# Patient Record
Sex: FEMALE | Race: WHITE | NOT HISPANIC OR LATINO | URBAN - METROPOLITAN AREA
[De-identification: names, ages, dates, MRNs, and addresses within clinical notes are randomized per-mention and may not be internally consistent; named-entity substitution may affect disease eponyms.]

---

## 2017-08-22 ENCOUNTER — OUTPATIENT (OUTPATIENT)
Dept: OUTPATIENT SERVICES | Facility: HOSPITAL | Age: 72
LOS: 1 days | End: 2017-08-22

## 2017-08-22 ENCOUNTER — INPATIENT (INPATIENT)
Facility: HOSPITAL | Age: 72
LOS: 14 days | Discharge: NURSING, REHAB OR LONG-TERM CARE | DRG: 853 | End: 2017-09-06
Attending: INTERNAL MEDICINE | Admitting: INTERNAL MEDICINE
Payer: MEDICARE

## 2017-08-22 VITALS
OXYGEN SATURATION: 99 % | DIASTOLIC BLOOD PRESSURE: 83 MMHG | RESPIRATION RATE: 23 BRPM | SYSTOLIC BLOOD PRESSURE: 153 MMHG | TEMPERATURE: 208 F | HEART RATE: 160 BPM

## 2017-08-22 DIAGNOSIS — E87.6 HYPOKALEMIA: ICD-10-CM

## 2017-08-22 DIAGNOSIS — I10 ESSENTIAL (PRIMARY) HYPERTENSION: ICD-10-CM

## 2017-08-22 DIAGNOSIS — S42.341D DISPLACED SPIRAL FRACTURE OF SHAFT OF HUMERUS, RIGHT ARM, SUBSEQUENT ENCOUNTER FOR FRACTURE WITH ROUTINE HEALING: ICD-10-CM

## 2017-08-22 DIAGNOSIS — G47.00 INSOMNIA, UNSPECIFIED: ICD-10-CM

## 2017-08-22 DIAGNOSIS — S42.342D DISPLACED SPIRAL FRACTURE OF SHAFT OF HUMERUS, LEFT ARM, SUBSEQUENT ENCOUNTER FOR FRACTURE WITH ROUTINE HEALING: ICD-10-CM

## 2017-08-22 DIAGNOSIS — J18.9 PNEUMONIA, UNSPECIFIED ORGANISM: ICD-10-CM

## 2017-08-22 DIAGNOSIS — Z01.818 ENCOUNTER FOR OTHER PREPROCEDURAL EXAMINATION: ICD-10-CM

## 2017-08-22 DIAGNOSIS — I48.91 UNSPECIFIED ATRIAL FIBRILLATION: ICD-10-CM

## 2017-08-22 DIAGNOSIS — E83.39 OTHER DISORDERS OF PHOSPHORUS METABOLISM: ICD-10-CM

## 2017-08-22 DIAGNOSIS — K21.9 GASTRO-ESOPHAGEAL REFLUX DISEASE WITHOUT ESOPHAGITIS: ICD-10-CM

## 2017-08-22 DIAGNOSIS — E78.5 HYPERLIPIDEMIA, UNSPECIFIED: ICD-10-CM

## 2017-08-22 LAB
ALBUMIN SERPL ELPH-MCNC: 3.2 G/DL — LOW (ref 3.3–5)
ALP SERPL-CCNC: 105 U/L — SIGNIFICANT CHANGE UP (ref 40–120)
ALT FLD-CCNC: 27 U/L RC — SIGNIFICANT CHANGE UP (ref 10–45)
ANION GAP SERPL CALC-SCNC: 18 MMOL/L — HIGH (ref 5–17)
ANION GAP SERPL CALC-SCNC: 18 MMOL/L — HIGH (ref 5–17)
APTT BLD: 35.5 SEC — SIGNIFICANT CHANGE UP (ref 27.5–37.4)
AST SERPL-CCNC: 22 U/L — SIGNIFICANT CHANGE UP (ref 10–40)
BASOPHILS # BLD AUTO: 0.1 K/UL — SIGNIFICANT CHANGE UP (ref 0–0.2)
BASOPHILS NFR BLD AUTO: 0.5 % — SIGNIFICANT CHANGE UP (ref 0–2)
BILIRUB SERPL-MCNC: 0.5 MG/DL — SIGNIFICANT CHANGE UP (ref 0.2–1.2)
BLD GP AB SCN SERPL QL: NEGATIVE — SIGNIFICANT CHANGE UP
BUN SERPL-MCNC: 11 MG/DL — SIGNIFICANT CHANGE UP (ref 7–23)
BUN SERPL-MCNC: 9 MG/DL — SIGNIFICANT CHANGE UP (ref 7–23)
CALCIUM SERPL-MCNC: 8.2 MG/DL — LOW (ref 8.4–10.5)
CALCIUM SERPL-MCNC: 8.5 MG/DL — SIGNIFICANT CHANGE UP (ref 8.4–10.5)
CHLORIDE SERPL-SCNC: 97 MMOL/L — SIGNIFICANT CHANGE UP (ref 96–108)
CHLORIDE SERPL-SCNC: 99 MMOL/L — SIGNIFICANT CHANGE UP (ref 96–108)
CK MB CFR SERPL CALC: 1 NG/ML — SIGNIFICANT CHANGE UP (ref 0–3.8)
CK SERPL-CCNC: 18 U/L — LOW (ref 25–170)
CO2 SERPL-SCNC: 23 MMOL/L — SIGNIFICANT CHANGE UP (ref 22–31)
CO2 SERPL-SCNC: 23 MMOL/L — SIGNIFICANT CHANGE UP (ref 22–31)
CREAT SERPL-MCNC: 0.52 MG/DL — SIGNIFICANT CHANGE UP (ref 0.5–1.3)
CREAT SERPL-MCNC: 0.59 MG/DL — SIGNIFICANT CHANGE UP (ref 0.5–1.3)
EOSINOPHIL # BLD AUTO: 0.1 K/UL — SIGNIFICANT CHANGE UP (ref 0–0.5)
EOSINOPHIL NFR BLD AUTO: 0.4 % — SIGNIFICANT CHANGE UP (ref 0–6)
GAS PNL BLDV: SIGNIFICANT CHANGE UP
GLUCOSE SERPL-MCNC: 120 MG/DL — HIGH (ref 70–99)
GLUCOSE SERPL-MCNC: 145 MG/DL — HIGH (ref 70–99)
HCT VFR BLD CALC: 36.5 % — SIGNIFICANT CHANGE UP (ref 34.5–45)
HGB BLD-MCNC: 11.5 G/DL — SIGNIFICANT CHANGE UP (ref 11.5–15.5)
INR BLD: 1.61 RATIO — HIGH (ref 0.88–1.16)
LYMPHOCYTES # BLD AUTO: 1.4 K/UL — SIGNIFICANT CHANGE UP (ref 1–3.3)
LYMPHOCYTES # BLD AUTO: 7.8 % — LOW (ref 13–44)
MAGNESIUM SERPL-MCNC: 0.7 MG/DL — CRITICAL LOW (ref 1.6–2.6)
MCHC RBC-ENTMCNC: 29.2 PG — SIGNIFICANT CHANGE UP (ref 27–34)
MCHC RBC-ENTMCNC: 31.5 GM/DL — LOW (ref 32–36)
MCV RBC AUTO: 92.7 FL — SIGNIFICANT CHANGE UP (ref 80–100)
MONOCYTES # BLD AUTO: 1.3 K/UL — HIGH (ref 0–0.9)
MONOCYTES NFR BLD AUTO: 7 % — SIGNIFICANT CHANGE UP (ref 2–14)
NEUTROPHILS # BLD AUTO: 15.3 K/UL — HIGH (ref 1.8–7.4)
NEUTROPHILS NFR BLD AUTO: 84.4 % — HIGH (ref 43–77)
PHOSPHATE SERPL-MCNC: 2.4 MG/DL — LOW (ref 2.5–4.5)
PLATELET # BLD AUTO: 482 K/UL — HIGH (ref 150–400)
POTASSIUM SERPL-MCNC: 3 MMOL/L — LOW (ref 3.5–5.3)
POTASSIUM SERPL-MCNC: 3.2 MMOL/L — LOW (ref 3.5–5.3)
POTASSIUM SERPL-SCNC: 3 MMOL/L — LOW (ref 3.5–5.3)
POTASSIUM SERPL-SCNC: 3.2 MMOL/L — LOW (ref 3.5–5.3)
PROT SERPL-MCNC: 7.7 G/DL — SIGNIFICANT CHANGE UP (ref 6–8.3)
PROTHROM AB SERPL-ACNC: 17.7 SEC — HIGH (ref 9.8–12.7)
RBC # BLD: 3.94 M/UL — SIGNIFICANT CHANGE UP (ref 3.8–5.2)
RBC # FLD: 12.9 % — SIGNIFICANT CHANGE UP (ref 10.3–14.5)
RH IG SCN BLD-IMP: POSITIVE — SIGNIFICANT CHANGE UP
SODIUM SERPL-SCNC: 138 MMOL/L — SIGNIFICANT CHANGE UP (ref 135–145)
SODIUM SERPL-SCNC: 140 MMOL/L — SIGNIFICANT CHANGE UP (ref 135–145)
TROPONIN T SERPL-MCNC: <0.01 NG/ML — SIGNIFICANT CHANGE UP (ref 0–0.06)
TROPONIN T SERPL-MCNC: <0.01 NG/ML — SIGNIFICANT CHANGE UP (ref 0–0.06)
WBC # BLD: 18.1 K/UL — HIGH (ref 3.8–10.5)
WBC # FLD AUTO: 18.1 K/UL — HIGH (ref 3.8–10.5)

## 2017-08-22 PROCEDURE — 99285 EMERGENCY DEPT VISIT HI MDM: CPT | Mod: GC

## 2017-08-22 PROCEDURE — 71010: CPT | Mod: 26

## 2017-08-22 PROCEDURE — 99223 1ST HOSP IP/OBS HIGH 75: CPT

## 2017-08-22 PROCEDURE — 93010 ELECTROCARDIOGRAM REPORT: CPT

## 2017-08-22 RX ORDER — ACETAMINOPHEN 500 MG
650 TABLET ORAL ONCE
Qty: 0 | Refills: 0 | Status: COMPLETED | OUTPATIENT
Start: 2017-08-22 | End: 2017-08-22

## 2017-08-22 RX ORDER — MAGNESIUM SULFATE 500 MG/ML
2 VIAL (ML) INJECTION ONCE
Qty: 0 | Refills: 0 | Status: COMPLETED | OUTPATIENT
Start: 2017-08-22 | End: 2017-08-22

## 2017-08-22 RX ORDER — ACETAMINOPHEN 500 MG
650 TABLET ORAL EVERY 6 HOURS
Qty: 0 | Refills: 0 | Status: DISCONTINUED | OUTPATIENT
Start: 2017-08-22 | End: 2017-08-26

## 2017-08-22 RX ORDER — PANTOPRAZOLE SODIUM 20 MG/1
40 TABLET, DELAYED RELEASE ORAL
Qty: 0 | Refills: 0 | Status: DISCONTINUED | OUTPATIENT
Start: 2017-08-22 | End: 2017-08-25

## 2017-08-22 RX ORDER — METOPROLOL TARTRATE 50 MG
2.5 TABLET ORAL ONCE
Qty: 0 | Refills: 0 | Status: COMPLETED | OUTPATIENT
Start: 2017-08-22 | End: 2017-08-22

## 2017-08-22 RX ORDER — SODIUM CHLORIDE 9 MG/ML
1000 INJECTION INTRAMUSCULAR; INTRAVENOUS; SUBCUTANEOUS
Qty: 0 | Refills: 0 | Status: DISCONTINUED | OUTPATIENT
Start: 2017-08-22 | End: 2017-08-26

## 2017-08-22 RX ORDER — POTASSIUM PHOSPHATE, MONOBASIC POTASSIUM PHOSPHATE, DIBASIC 236; 224 MG/ML; MG/ML
15 INJECTION, SOLUTION INTRAVENOUS ONCE
Qty: 0 | Refills: 0 | Status: COMPLETED | OUTPATIENT
Start: 2017-08-22 | End: 2017-08-22

## 2017-08-22 RX ORDER — HEPARIN SODIUM 5000 [USP'U]/ML
4000 INJECTION INTRAVENOUS; SUBCUTANEOUS EVERY 6 HOURS
Qty: 0 | Refills: 0 | Status: DISCONTINUED | OUTPATIENT
Start: 2017-08-22 | End: 2017-08-24

## 2017-08-22 RX ORDER — HEPARIN SODIUM 5000 [USP'U]/ML
INJECTION INTRAVENOUS; SUBCUTANEOUS
Qty: 25000 | Refills: 0 | Status: DISCONTINUED | OUTPATIENT
Start: 2017-08-22 | End: 2017-08-23

## 2017-08-22 RX ORDER — POLYETHYLENE GLYCOL 3350 17 G/17G
17 POWDER, FOR SOLUTION ORAL DAILY
Qty: 0 | Refills: 0 | Status: DISCONTINUED | OUTPATIENT
Start: 2017-08-22 | End: 2017-08-25

## 2017-08-22 RX ORDER — METOPROLOL TARTRATE 50 MG
5 TABLET ORAL ONCE
Qty: 0 | Refills: 0 | Status: DISCONTINUED | OUTPATIENT
Start: 2017-08-22 | End: 2017-08-22

## 2017-08-22 RX ORDER — SODIUM CHLORIDE 9 MG/ML
500 INJECTION INTRAMUSCULAR; INTRAVENOUS; SUBCUTANEOUS ONCE
Qty: 0 | Refills: 0 | Status: COMPLETED | OUTPATIENT
Start: 2017-08-22 | End: 2017-08-22

## 2017-08-22 RX ORDER — LISINOPRIL 2.5 MG/1
10 TABLET ORAL DAILY
Qty: 0 | Refills: 0 | Status: DISCONTINUED | OUTPATIENT
Start: 2017-08-22 | End: 2017-08-22

## 2017-08-22 RX ORDER — METOPROLOL TARTRATE 50 MG
5 TABLET ORAL ONCE
Qty: 0 | Refills: 0 | Status: COMPLETED | OUTPATIENT
Start: 2017-08-22 | End: 2017-08-22

## 2017-08-22 RX ORDER — POTASSIUM CHLORIDE 20 MEQ
40 PACKET (EA) ORAL ONCE
Qty: 0 | Refills: 0 | Status: COMPLETED | OUTPATIENT
Start: 2017-08-22 | End: 2017-08-22

## 2017-08-22 RX ORDER — CEFTRIAXONE 500 MG/1
1 INJECTION, POWDER, FOR SOLUTION INTRAMUSCULAR; INTRAVENOUS ONCE
Qty: 0 | Refills: 0 | Status: COMPLETED | OUTPATIENT
Start: 2017-08-22 | End: 2017-08-22

## 2017-08-22 RX ORDER — ATORVASTATIN CALCIUM 80 MG/1
40 TABLET, FILM COATED ORAL AT BEDTIME
Qty: 0 | Refills: 0 | Status: DISCONTINUED | OUTPATIENT
Start: 2017-08-22 | End: 2017-09-06

## 2017-08-22 RX ORDER — ZOLPIDEM TARTRATE 10 MG/1
5 TABLET ORAL AT BEDTIME
Qty: 0 | Refills: 0 | Status: DISCONTINUED | OUTPATIENT
Start: 2017-08-22 | End: 2017-08-25

## 2017-08-22 RX ORDER — METOPROLOL TARTRATE 50 MG
25 TABLET ORAL ONCE
Qty: 0 | Refills: 0 | Status: COMPLETED | OUTPATIENT
Start: 2017-08-22 | End: 2017-08-22

## 2017-08-22 RX ORDER — HEPARIN SODIUM 5000 [USP'U]/ML
4000 INJECTION INTRAVENOUS; SUBCUTANEOUS ONCE
Qty: 0 | Refills: 0 | Status: COMPLETED | OUTPATIENT
Start: 2017-08-22 | End: 2017-08-22

## 2017-08-22 RX ORDER — METOPROLOL TARTRATE 50 MG
25 TABLET ORAL DAILY
Qty: 0 | Refills: 0 | Status: DISCONTINUED | OUTPATIENT
Start: 2017-08-22 | End: 2017-08-23

## 2017-08-22 RX ORDER — POTASSIUM CHLORIDE 20 MEQ
10 PACKET (EA) ORAL
Qty: 0 | Refills: 0 | Status: DISCONTINUED | OUTPATIENT
Start: 2017-08-22 | End: 2017-08-22

## 2017-08-22 RX ORDER — HEPARIN SODIUM 5000 [USP'U]/ML
2000 INJECTION INTRAVENOUS; SUBCUTANEOUS EVERY 6 HOURS
Qty: 0 | Refills: 0 | Status: DISCONTINUED | OUTPATIENT
Start: 2017-08-22 | End: 2017-08-24

## 2017-08-22 RX ORDER — NIFEDIPINE 30 MG
60 TABLET, EXTENDED RELEASE 24 HR ORAL DAILY
Qty: 0 | Refills: 0 | Status: DISCONTINUED | OUTPATIENT
Start: 2017-08-22 | End: 2017-08-22

## 2017-08-22 RX ORDER — ASPIRIN/CALCIUM CARB/MAGNESIUM 324 MG
81 TABLET ORAL DAILY
Qty: 0 | Refills: 0 | Status: DISCONTINUED | OUTPATIENT
Start: 2017-08-22 | End: 2017-09-06

## 2017-08-22 RX ORDER — MAGNESIUM SULFATE 500 MG/ML
1 VIAL (ML) INJECTION ONCE
Qty: 0 | Refills: 0 | Status: DISCONTINUED | OUTPATIENT
Start: 2017-08-22 | End: 2017-08-22

## 2017-08-22 RX ORDER — POTASSIUM CHLORIDE 20 MEQ
40 PACKET (EA) ORAL EVERY 4 HOURS
Qty: 0 | Refills: 0 | Status: COMPLETED | OUTPATIENT
Start: 2017-08-22 | End: 2017-08-23

## 2017-08-22 RX ADMIN — Medication 650 MILLIGRAM(S): at 18:47

## 2017-08-22 RX ADMIN — Medication 50 GRAM(S): at 16:34

## 2017-08-22 RX ADMIN — ATORVASTATIN CALCIUM 40 MILLIGRAM(S): 80 TABLET, FILM COATED ORAL at 22:18

## 2017-08-22 RX ADMIN — SODIUM CHLORIDE 1000 MILLILITER(S): 9 INJECTION INTRAMUSCULAR; INTRAVENOUS; SUBCUTANEOUS at 19:14

## 2017-08-22 RX ADMIN — SODIUM CHLORIDE 55 MILLILITER(S): 9 INJECTION INTRAMUSCULAR; INTRAVENOUS; SUBCUTANEOUS at 23:04

## 2017-08-22 RX ADMIN — Medication 5 MILLIGRAM(S): at 12:48

## 2017-08-22 RX ADMIN — CEFTRIAXONE 100 GRAM(S): 500 INJECTION, POWDER, FOR SOLUTION INTRAMUSCULAR; INTRAVENOUS at 19:14

## 2017-08-22 RX ADMIN — Medication 25 MILLIGRAM(S): at 13:03

## 2017-08-22 RX ADMIN — HEPARIN SODIUM 1000 UNIT(S)/HR: 5000 INJECTION INTRAVENOUS; SUBCUTANEOUS at 18:18

## 2017-08-22 RX ADMIN — Medication 5 MILLIGRAM(S): at 12:30

## 2017-08-22 RX ADMIN — ZOLPIDEM TARTRATE 5 MILLIGRAM(S): 10 TABLET ORAL at 22:18

## 2017-08-22 RX ADMIN — HEPARIN SODIUM 4000 UNIT(S): 5000 INJECTION INTRAVENOUS; SUBCUTANEOUS at 18:18

## 2017-08-22 RX ADMIN — Medication 40 MILLIEQUIVALENT(S): at 22:16

## 2017-08-22 RX ADMIN — POTASSIUM PHOSPHATE, MONOBASIC POTASSIUM PHOSPHATE, DIBASIC 62.5 MILLIMOLE(S): 236; 224 INJECTION, SOLUTION INTRAVENOUS at 22:18

## 2017-08-22 RX ADMIN — Medication 40 MILLIEQUIVALENT(S): at 18:47

## 2017-08-22 RX ADMIN — Medication 2.5 MILLIGRAM(S): at 22:18

## 2017-08-22 NOTE — H&P ADULT - NEGATIVE CARDIOVASCULAR SYMPTOMS
no peripheral edema/no paroxysmal nocturnal dyspnea/no chest pain/no dyspnea on exertion/no palpitations

## 2017-08-22 NOTE — ED PROVIDER NOTE - NS ED ROS FT
No fever, no chills, no change in vision, no change in hearing, no chest pain, no shortness of breath, no abdominal pain, no vomiting, no dysuria, no muscle pain, no rashes, no loss of consciousness. ~ Dawson Bergman MD

## 2017-08-22 NOTE — H&P ADULT - NEUROLOGICAL DETAILS
responds to pain/responds to verbal commands/sensation intact/alert and oriented x 3/cranial nerves intact

## 2017-08-22 NOTE — ED PROVIDER NOTE - PHYSICAL EXAMINATION
Physical Exam: elderly F who is in NAD, AAOx3, NCAT, MMM, neck is supple, PERRL, CTAB, + tachycardia and irregular rhythm, abdomen is soft and NTND, No edema, L humerus in hard cast, NVI distally to all extremities, No rashes, CN grossly intact, No focal motor or sensory deficits. ~ Dawson Bergman MD

## 2017-08-22 NOTE — H&P ADULT - PROBLEM SELECTOR PLAN 1
-Pt found to be in Afib with RVR likely in the setting of sepsis secondary to RLL pna  -Received IV cardizem and metoprolol in ED, rate currently in 90s on tele monitoring  -Will treat uderlying PNA  -If tachycardic overnight can give IV lopressor based on BP readings  -Keep K>4 and Mag >2  -Check echo  -Continue tele monitoring  -First set of cardiac enzymes negative, check serial troponins   -Check tsh in am  -Continue IV heparin, monitor ptt  -Pt will need good adjustment of home regimen of meds, may require uptitration of toprol xl or addition of cardizem depending on rate monitoring on tele   -Unable to find EKG in ED, check stat EKG

## 2017-08-22 NOTE — ED PROVIDER NOTE - OBJECTIVE STATEMENT
Dawson Bergman MD (resident): 72 F w/ HTN,  HLD, and L humerus Fx after mechanical fall in 5/2017 s/p L arm hard brace, who was at pre-operative testing for ORIF planned for tomorrow, when the found pulse up to 170s and EKG w/ AFib w/ RVR. Pt denies any symptoms of CP, SOB, palpitations, lightheadedness, dizziness. Takes metoprolol 25 mg, last yesterday.    PMD: Dr Eliud Perez (cards)   Ortho: Dr Ellis Lopez

## 2017-08-22 NOTE — H&P ADULT - ASSESSMENT
72 year old female with hx of HTN, HLD, gerd, insomnia s/p mechanical fall in 5/2017 with left humeral fracture sent from preop testing for Afib with  RVR.

## 2017-08-22 NOTE — H&P PST ADULT - PMH
Displaced spiral fracture of shaft of humerus, left arm, subsequent encounter for fracture with routine healing    GERD (gastroesophageal reflux disease)    HLD (hyperlipidemia)    HTN (hypertension)    Insomnia

## 2017-08-22 NOTE — ED ADULT NURSE NOTE - OBJECTIVE STATEMENT
71 yo female A&OX3 presents to the ED with the c/o elevated HR, pt was sent from pre surgical testing with HR in the 180's. Pt states that she is suppose to have surgery tomorrow for a l humerus fx repair. Pt denies any cardiac hx, states that she takes metoprolol at home daily. Denies hx of Afib. Pt denies feeling any heart palpitations, no sob, no cp and no cough. Lungs clear, equal b/l no cough. Pt appears calm and alert. [id number]

## 2017-08-22 NOTE — ED PROVIDER NOTE - PROGRESS NOTE DETAILS
Dawson Bergman MD (resident): d/w Dr. Perez, who recommended heparin drip for A/C, and admit to [facility]. Endorse to Dr. Becker, who accepted the patient.

## 2017-08-22 NOTE — H&P PST ADULT - HISTORY OF PRESENT ILLNESS
73 y/o female poor historian with PMH of HTN,  HLD ,h/o mechanical fall in may/2017 sustained left humerus fracture F/u with Orthopedic  applied left arm hard brace . Pt presents to PST for scheduled ORIF left Humeral nonunion.  While in PST her pulse was rapid followed by 12 lead EKG -Rapid afib ,pt denies any CP, Palpitations ,SOB ,Fever or Chills Vitals  /84,, RR 20, Sat 96 % RA, Temp 98.1 .Pt was evaluated by anesthesiologist  in the room . Pt was transferred to Er via stretcher with cardiac monitor .Called cardiologist Dr Eliud Perez (left message) also left message with Dr Ellis Lopez. Unable to complete PST this visit secondary to Rapid Afib -pt need to transfer to ER for emergency. 71 y/o female poor historian with PMH of HTN,  HLD ,h/o mechanical fall in may/2017 sustained left humerus fracture F/u with Orthopedic  applied  hard brace on her left shoulder/arm . Pt presents to PST for scheduled ORIF left Humeral nonunion.  While in PST her pulse was rapid followed by 12 lead EKG - revealed Rapid afib , pt denies any CP, Palpitations ,SOB ,Fever or Chills- Vitals  /84,, RR 20, Sat 96 % RA, Temp 98.1 .Pt was evaluated by anesthesiologist  in the room . Pt was transferred to Er via stretcher with cardiac monitor .Called cardiologist Dr Eliud Perez (left message) also left message with Dr Ellis Lopez. Unable to complete PST this visit secondary to Rapid Afib -pt need to transfer to ER for emergency.    * Called received from Dr Ellis Dutta office spoke to Kimi - surgery was cancelled on 8/23/2017. 73 y/o female poor historian with PMH of HTN,  HLD,  h/o mechanical fall in 5/2017 sustained left humerus fracture F/u with Orthopedic applied  hard brace on her left shoulder/arm . Pt presents to PST for scheduled ORIF left Humeral nonunion.  While in PST her pulse was rapid followed by 12 lead EKG - revealed Rapid Afib , Pt denies any CP, Palpitations ,SOB , Lightheadedness , dizziness, Fever or Chills - Vitals  /84,, RR 20, Sat 96 % RA, Temp 98.1 .Pt was evaluated by anesthesiologist  in the room . Pt was transferred to Er via stretcher with cardiac monitor .Called cardiologist Dr Eliud Perez (left message) also left message with Dr Ellis Lopez. Unable to complete PST this visit secondary to Rapid Afib -pt need to transfer to ER for emergency.    * Called received from Dr Ellis Dutta office spoke to Kimi - surgery was cancelled on 8/23/2017.

## 2017-08-22 NOTE — H&P ADULT - PROBLEM SELECTOR PLAN 7
-Hold home dose of lisinopril and nifedipine in the setting of sepsis  -Pt may require cardizem for better bp and hr control   -Resume when bp allows

## 2017-08-22 NOTE — ED ADULT NURSE NOTE - CHPI ED SYMPTOMS NEG
no diaphoresis/no fever/no back pain/no dizziness/no vomiting/no nausea/no shortness of breath/no chest pain/no syncope/no chills/no cough

## 2017-08-22 NOTE — ED PROVIDER NOTE - ATTENDING CONTRIBUTION TO CARE
Dr. Mackenzie (Attending Physician)  Pt. with ho htn, recent left humerus fx pw asymptomatic atrial fibrillation on pre-op testing for humerus.  Denies hilario, pnd, chest pain, shortness of breath.  Lungs clear.  Heart irregular and tachycardic.  Will send cardiac enzymes, coags, cxr and rate control with metoprolol which is her home med.

## 2017-08-22 NOTE — H&P ADULT - PROBLEM SELECTOR PLAN 2
-Found to be in sepsis secondary to community acquired rll pna with fever, leukocytosis, right lung consolidation on imaging  -Pt with extensive smoking hx, now with weight loss/poor appetite, never had screening CT chest  -Check CT chest for further evaluation of right sided consolidation/ opcification/ r/o underlying malignancy  -IV levaquin   -Follow up blood cultures  -Check urine legionella  -Trend wbc/temp curve  -Check ua/ urine culture as part of sepsis workup/ exclude uti

## 2017-08-22 NOTE — H&P ADULT - PROBLEM SELECTOR PROBLEM 3
Displaced spiral fracture of shaft of humerus, left arm, subsequent encounter for fracture with routine healing

## 2017-08-22 NOTE — H&P ADULT - HISTORY OF PRESENT ILLNESS
72 year old female with hx of HTN, HLD, gerd, insomnia s/p mechanical fall in 5/2017 with left humeral fracture sent from preop testing for Afib with  RVR. Pt scheduled to undergo ORIF of left upper extremity tomorrow, now admitted for workup of Afib/rvr/sepsis. Denies chest pain, palpitations, shortness of breath, abdominal pain, fevers, chills, cough, dysuria, hematuria, melena, hematochezia. No headaches, dizziness, LOC. No sick contacts or travel history. Chronic smoking history over 50 pack years, quit in May 2017, never had CT scan. Reports decreased appetite and weight loss of 5-10lbs in the past month. Lives at home with family, ambulates without cane/ walker, ambulation has been limited since the fall/farcture. 72 year old female with hx of HTN, HLD, gerd, insomnia s/p mechanical fall in 5/2017 with left humeral fracture sent from preop testing for Afib with RVR. Pt scheduled to undergo ORIF of left upper extremity tomorrow, now admitted for workup of Afib/rvr/sepsis. Denies chest pain, palpitations, shortness of breath, abdominal pain, fevers, chills, cough, dysuria, hematuria, melena, hematochezia. No headaches, dizziness, LOC. No sick contacts or travel history. Chronic smoking history over 50 pack years, quit in May 2017, never had CT scan. Reports decreased appetite and weight loss of 5-10lbs in the past month. Lives at home with family, ambulates without cane/ walker, ambulation has been limited since the fall/farcture.

## 2017-08-22 NOTE — ED PROVIDER NOTE - MEDICAL DECISION MAKING DETAILS
Dawson Bergman MD (resident): 72 F w/ Hx of HTN and L humerus Fx s/p hard brace and at [facility] for ORIF today, but found to have AFib w/ RVR up to 170's. No Hx of arrhythmia. Cause for AFib with wide DDx including electrolytes, cardiac/ischemic. Not likely due to VTE/PE as pt w/o symptoms of CP/SOB/palps. Will rate control pt, no medical cardioversion as pt w/o known onset of AFib, and not on A/C.

## 2017-08-23 LAB
ALBUMIN SERPL ELPH-MCNC: 2.1 G/DL — LOW (ref 3.3–5)
ALP SERPL-CCNC: 95 U/L — SIGNIFICANT CHANGE UP (ref 40–120)
ALT FLD-CCNC: 27 U/L — SIGNIFICANT CHANGE UP (ref 10–45)
ANION GAP SERPL CALC-SCNC: 14 MMOL/L — SIGNIFICANT CHANGE UP (ref 5–17)
APPEARANCE UR: CLEAR — SIGNIFICANT CHANGE UP
APTT BLD: 27.1 SEC — LOW (ref 27.5–37.4)
APTT BLD: 51.6 SEC — HIGH (ref 27.5–37.4)
APTT BLD: 72.2 SEC — HIGH (ref 27.5–37.4)
APTT BLD: 75.8 SEC — HIGH (ref 27.5–37.4)
AST SERPL-CCNC: 38 U/L — SIGNIFICANT CHANGE UP (ref 10–40)
BASOPHILS # BLD AUTO: 0.02 K/UL — SIGNIFICANT CHANGE UP (ref 0–0.2)
BASOPHILS NFR BLD AUTO: 0.1 % — SIGNIFICANT CHANGE UP (ref 0–2)
BILIRUB SERPL-MCNC: 0.3 MG/DL — SIGNIFICANT CHANGE UP (ref 0.2–1.2)
BILIRUB UR-MCNC: NEGATIVE — SIGNIFICANT CHANGE UP
BUN SERPL-MCNC: 11 MG/DL — SIGNIFICANT CHANGE UP (ref 7–23)
CALCIUM SERPL-MCNC: 7.4 MG/DL — LOW (ref 8.4–10.5)
CHLORIDE SERPL-SCNC: 106 MMOL/L — SIGNIFICANT CHANGE UP (ref 96–108)
CHOLEST SERPL-MCNC: 108 MG/DL — SIGNIFICANT CHANGE UP (ref 10–199)
CK MB BLD-MCNC: 4.8 % — HIGH (ref 0–3.5)
CK MB CFR SERPL CALC: 1 NG/ML — SIGNIFICANT CHANGE UP (ref 0–3.8)
CK SERPL-CCNC: 21 U/L — LOW (ref 25–170)
CO2 SERPL-SCNC: 20 MMOL/L — LOW (ref 22–31)
COLOR SPEC: YELLOW — SIGNIFICANT CHANGE UP
CREAT SERPL-MCNC: 0.46 MG/DL — LOW (ref 0.5–1.3)
DIFF PNL FLD: NEGATIVE — SIGNIFICANT CHANGE UP
EOSINOPHIL # BLD AUTO: 0.06 K/UL — SIGNIFICANT CHANGE UP (ref 0–0.5)
EOSINOPHIL NFR BLD AUTO: 0.4 % — SIGNIFICANT CHANGE UP (ref 0–6)
GLUCOSE SERPL-MCNC: 112 MG/DL — HIGH (ref 70–99)
GLUCOSE UR QL: NEGATIVE — SIGNIFICANT CHANGE UP
HBA1C BLD-MCNC: 6 % — HIGH (ref 4–5.6)
HCT VFR BLD CALC: 13.3 % — CRITICAL LOW (ref 34.5–45)
HCT VFR BLD CALC: 30.2 % — LOW (ref 34.5–45)
HCT VFR BLD CALC: 33.1 % — LOW (ref 34.5–45)
HDLC SERPL-MCNC: 27 MG/DL — LOW (ref 40–125)
HGB BLD-MCNC: 10.7 G/DL — LOW (ref 11.5–15.5)
HGB BLD-MCNC: 4.2 G/DL — CRITICAL LOW (ref 11.5–15.5)
HGB BLD-MCNC: 9.2 G/DL — LOW (ref 11.5–15.5)
IMM GRANULOCYTES NFR BLD AUTO: 0.4 % — SIGNIFICANT CHANGE UP (ref 0–1.5)
KETONES UR-MCNC: NEGATIVE — SIGNIFICANT CHANGE UP
LEGIONELLA AG UR QL: NEGATIVE — SIGNIFICANT CHANGE UP
LEUKOCYTE ESTERASE UR-ACNC: ABNORMAL
LIPID PNL WITH DIRECT LDL SERPL: 62 MG/DL — SIGNIFICANT CHANGE UP
LYMPHOCYTES # BLD AUTO: 1.23 K/UL — SIGNIFICANT CHANGE UP (ref 1–3.3)
LYMPHOCYTES # BLD AUTO: 9.1 % — LOW (ref 13–44)
MAGNESIUM SERPL-MCNC: 1.4 MG/DL — LOW (ref 1.6–2.6)
MAGNESIUM SERPL-MCNC: 1.6 MG/DL — SIGNIFICANT CHANGE UP (ref 1.6–2.6)
MCHC RBC-ENTMCNC: 27.6 PG — SIGNIFICANT CHANGE UP (ref 27–34)
MCHC RBC-ENTMCNC: 30.2 PG — SIGNIFICANT CHANGE UP (ref 27–34)
MCHC RBC-ENTMCNC: 30.5 GM/DL — LOW (ref 32–36)
MCHC RBC-ENTMCNC: 30.6 PG — SIGNIFICANT CHANGE UP (ref 27–34)
MCHC RBC-ENTMCNC: 31.3 GM/DL — LOW (ref 32–36)
MCHC RBC-ENTMCNC: 32.4 GM/DL — SIGNIFICANT CHANGE UP (ref 32–36)
MCV RBC AUTO: 90.7 FL — SIGNIFICANT CHANGE UP (ref 80–100)
MCV RBC AUTO: 93.1 FL — SIGNIFICANT CHANGE UP (ref 80–100)
MCV RBC AUTO: 97.9 FL — SIGNIFICANT CHANGE UP (ref 80–100)
MONOCYTES # BLD AUTO: 1.38 K/UL — HIGH (ref 0–0.9)
MONOCYTES NFR BLD AUTO: 10.2 % — SIGNIFICANT CHANGE UP (ref 2–14)
NEUTROPHILS # BLD AUTO: 10.82 K/UL — HIGH (ref 1.8–7.4)
NEUTROPHILS NFR BLD AUTO: 79.8 % — HIGH (ref 43–77)
NITRITE UR-MCNC: NEGATIVE — SIGNIFICANT CHANGE UP
PH UR: 6 — SIGNIFICANT CHANGE UP (ref 5–8)
PHOSPHATE SERPL-MCNC: 2.6 MG/DL — SIGNIFICANT CHANGE UP (ref 2.5–4.5)
PLATELET # BLD AUTO: 158 K/UL — SIGNIFICANT CHANGE UP (ref 150–400)
PLATELET # BLD AUTO: 419 K/UL — HIGH (ref 150–400)
PLATELET # BLD AUTO: 463 K/UL — HIGH (ref 150–400)
POTASSIUM SERPL-MCNC: 5 MMOL/L — SIGNIFICANT CHANGE UP (ref 3.5–5.3)
POTASSIUM SERPL-SCNC: 5 MMOL/L — SIGNIFICANT CHANGE UP (ref 3.5–5.3)
PROT SERPL-MCNC: 6.1 G/DL — SIGNIFICANT CHANGE UP (ref 6–8.3)
PROT UR-MCNC: 100 MG/DL
RBC # BLD: 1.36 M/UL — LOW (ref 3.8–5.2)
RBC # BLD: 3.33 M/UL — LOW (ref 3.8–5.2)
RBC # BLD: 3.55 M/UL — LOW (ref 3.8–5.2)
RBC # FLD: 12.6 % — SIGNIFICANT CHANGE UP (ref 10.3–14.5)
RBC # FLD: 12.9 % — SIGNIFICANT CHANGE UP (ref 10.3–14.5)
RBC # FLD: 14.2 % — SIGNIFICANT CHANGE UP (ref 10.3–14.5)
SODIUM SERPL-SCNC: 140 MMOL/L — SIGNIFICANT CHANGE UP (ref 135–145)
SP GR SPEC: 1.02 — SIGNIFICANT CHANGE UP (ref 1.01–1.02)
TOTAL CHOLESTEROL/HDL RATIO MEASUREMENT: 4 RATIO — SIGNIFICANT CHANGE UP (ref 3.3–7.1)
TRIGL SERPL-MCNC: 93 MG/DL — SIGNIFICANT CHANGE UP (ref 10–149)
TROPONIN T SERPL-MCNC: <0.01 NG/ML — SIGNIFICANT CHANGE UP (ref 0–0.06)
TSH SERPL-MCNC: 1.77 UIU/ML — SIGNIFICANT CHANGE UP (ref 0.27–4.2)
UROBILINOGEN FLD QL: 1
WBC # BLD: 13.57 K/UL — HIGH (ref 3.8–10.5)
WBC # BLD: 17.1 K/UL — HIGH (ref 3.8–10.5)
WBC # BLD: 5.5 K/UL — SIGNIFICANT CHANGE UP (ref 3.8–10.5)
WBC # FLD AUTO: 13.57 K/UL — HIGH (ref 3.8–10.5)
WBC # FLD AUTO: 17.1 K/UL — HIGH (ref 3.8–10.5)
WBC # FLD AUTO: 5.5 K/UL — SIGNIFICANT CHANGE UP (ref 3.8–10.5)

## 2017-08-23 PROCEDURE — 71275 CT ANGIOGRAPHY CHEST: CPT | Mod: 26

## 2017-08-23 PROCEDURE — 76604 US EXAM CHEST: CPT | Mod: 26

## 2017-08-23 PROCEDURE — 71010: CPT | Mod: 26

## 2017-08-23 PROCEDURE — 93010 ELECTROCARDIOGRAM REPORT: CPT

## 2017-08-23 RX ORDER — METOPROLOL TARTRATE 50 MG
25 TABLET ORAL
Qty: 0 | Refills: 0 | Status: DISCONTINUED | OUTPATIENT
Start: 2017-08-23 | End: 2017-08-25

## 2017-08-23 RX ORDER — DIGOXIN 250 MCG
0.5 TABLET ORAL EVERY 6 HOURS
Qty: 0 | Refills: 0 | Status: COMPLETED | OUTPATIENT
Start: 2017-08-23 | End: 2017-08-23

## 2017-08-23 RX ORDER — METOPROLOL TARTRATE 50 MG
2.5 TABLET ORAL ONCE
Qty: 0 | Refills: 0 | Status: COMPLETED | OUTPATIENT
Start: 2017-08-23 | End: 2017-08-23

## 2017-08-23 RX ORDER — SODIUM CHLORIDE 9 MG/ML
500 INJECTION INTRAMUSCULAR; INTRAVENOUS; SUBCUTANEOUS ONCE
Qty: 0 | Refills: 0 | Status: COMPLETED | OUTPATIENT
Start: 2017-08-23 | End: 2017-08-23

## 2017-08-23 RX ORDER — DIGOXIN 250 MCG
0.5 TABLET ORAL EVERY 6 HOURS
Qty: 0 | Refills: 0 | Status: DISCONTINUED | OUTPATIENT
Start: 2017-08-23 | End: 2017-08-23

## 2017-08-23 RX ORDER — DILTIAZEM HCL 120 MG
5 CAPSULE, EXT RELEASE 24 HR ORAL
Qty: 125 | Refills: 0 | Status: DISCONTINUED | OUTPATIENT
Start: 2017-08-23 | End: 2017-08-27

## 2017-08-23 RX ORDER — HEPARIN SODIUM 5000 [USP'U]/ML
INJECTION INTRAVENOUS; SUBCUTANEOUS
Qty: 25000 | Refills: 0 | Status: DISCONTINUED | OUTPATIENT
Start: 2017-08-23 | End: 2017-08-24

## 2017-08-23 RX ORDER — MAGNESIUM SULFATE 500 MG/ML
2 VIAL (ML) INJECTION ONCE
Qty: 0 | Refills: 0 | Status: COMPLETED | OUTPATIENT
Start: 2017-08-23 | End: 2017-08-23

## 2017-08-23 RX ORDER — SODIUM CHLORIDE 9 MG/ML
1000 INJECTION INTRAMUSCULAR; INTRAVENOUS; SUBCUTANEOUS
Qty: 0 | Refills: 0 | Status: DISCONTINUED | OUTPATIENT
Start: 2017-08-23 | End: 2017-08-26

## 2017-08-23 RX ADMIN — Medication 5 MG/HR: at 13:18

## 2017-08-23 RX ADMIN — Medication 25 MILLIGRAM(S): at 05:16

## 2017-08-23 RX ADMIN — HEPARIN SODIUM 1000 UNIT(S)/HR: 5000 INJECTION INTRAVENOUS; SUBCUTANEOUS at 19:17

## 2017-08-23 RX ADMIN — Medication 50 GRAM(S): at 18:44

## 2017-08-23 RX ADMIN — Medication 0.5 MILLIGRAM(S): at 18:44

## 2017-08-23 RX ADMIN — HEPARIN SODIUM 1000 UNIT(S)/HR: 5000 INJECTION INTRAVENOUS; SUBCUTANEOUS at 01:24

## 2017-08-23 RX ADMIN — PANTOPRAZOLE SODIUM 40 MILLIGRAM(S): 20 TABLET, DELAYED RELEASE ORAL at 05:16

## 2017-08-23 RX ADMIN — ZOLPIDEM TARTRATE 5 MILLIGRAM(S): 10 TABLET ORAL at 21:13

## 2017-08-23 RX ADMIN — SODIUM CHLORIDE 500 MILLILITER(S): 9 INJECTION INTRAMUSCULAR; INTRAVENOUS; SUBCUTANEOUS at 11:42

## 2017-08-23 RX ADMIN — HEPARIN SODIUM 2000 UNIT(S): 5000 INJECTION INTRAVENOUS; SUBCUTANEOUS at 08:00

## 2017-08-23 RX ADMIN — Medication 25 MILLIGRAM(S): at 18:50

## 2017-08-23 RX ADMIN — Medication 2.5 MILLIGRAM(S): at 02:08

## 2017-08-23 RX ADMIN — Medication 81 MILLIGRAM(S): at 11:48

## 2017-08-23 RX ADMIN — ATORVASTATIN CALCIUM 40 MILLIGRAM(S): 80 TABLET, FILM COATED ORAL at 21:13

## 2017-08-23 RX ADMIN — Medication 40 MILLIEQUIVALENT(S): at 02:14

## 2017-08-23 RX ADMIN — HEPARIN SODIUM 1100 UNIT(S)/HR: 5000 INJECTION INTRAVENOUS; SUBCUTANEOUS at 07:57

## 2017-08-23 RX ADMIN — Medication 2.5 MILLIGRAM(S): at 01:25

## 2017-08-23 RX ADMIN — Medication 0.5 MILLIGRAM(S): at 11:42

## 2017-08-23 RX ADMIN — Medication 650 MILLIGRAM(S): at 01:14

## 2017-08-23 NOTE — CONSULT NOTE ADULT - ASSESSMENT
ASSESSMENT    likely right lower lobe lung cancer - very concerned about a significant PE with severe hypoxemia, chest pressure perhaps representing right ventricular ischemia and AF with RVR    PLAN/RECOMMENDATIONS    oxygen supplementation to keep saturation greater than 92%  on heparin gtt for AF  CTA arranged for within 1 hour  cautious AF rate control with AV joe blocking agents as patient will be preload dependent if she has a large PE  IV fluids  on levaquin for now  may be a candidate for thrombolysis  ICU evaluation    Thank you for the courtesy of this referral    Truman Arellano MD, [facility] - [phone]  Pulmonary Medicine ASSESSMENT    likely right lower lobe lung cancer - very concerned about a significant PE with severe hypoxemia, chest pressure perhaps representing right ventricular ischemia and AF with RVR    PLAN/RECOMMENDATIONS    oxygen supplementation to keep saturation greater than 92%  on heparin gtt for AF  CTA arranged for within 1 hour  cautious AF rate control with AV joe blocking agents as patient will be preload dependent if she has a large PE  IV fluids  on levaquin for now  may be a candidate for thrombolysis  ICU evaluation    addendum: CTA without PE and with large loculated right pleural effusion - will hold heparin at midnight for thoracentesis in AM - thoracic surgery called in case patient needs evacuation of an empyema Dr. Miller    Thank you for the courtesy of this referral    Truman Arellano MD, [facility] - [phone]  Pulmonary Medicine

## 2017-08-23 NOTE — PROGRESS NOTE ADULT - SUBJECTIVE AND OBJECTIVE BOX
Patient is a 72y old  Female who presents with a chief complaint of Afib (22 Aug 2017 20:06)      SUBJECTIVE / OVERNIGHT EVENTS: events noted, pt hypoxic and tachycardic. no sob at this time    MEDICATIONS  (STANDING):  heparin  Infusion.  Unit(s)/Hr (10 mL/Hr) IV Continuous <Continuous>  levoFLOXacin IVPB   IV Intermittent   levoFLOXacin IVPB 500 milliGRAM(s) IV Intermittent every 24 hours  pantoprazole    Tablet 40 milliGRAM(s) Oral before breakfast  atorvastatin 40 milliGRAM(s) Oral at bedtime  aspirin  chewable 81 milliGRAM(s) Oral daily  zolpidem 5 milliGRAM(s) Oral at bedtime  polyethylene glycol 3350 17 Gram(s) Oral daily  sodium chloride 0.9%. 1000 milliLiter(s) (55 mL/Hr) IV Continuous <Continuous>  digoxin  Injectable 0.5 milliGRAM(s) IV Push every 6 hours  metoprolol 25 milliGRAM(s) Oral two times a day  diltiazem Infusion 5 mG/Hr (5 mL/Hr) IV Continuous <Continuous>  magnesium sulfate  IVPB 2 Gram(s) IV Intermittent once  sodium chloride 0.9%. 1000 milliLiter(s) (100 mL/Hr) IV Continuous <Continuous>    MEDICATIONS  (PRN):  heparin  Injectable 4000 Unit(s) IV Push every 6 hours PRN For aPTT less than 40  heparin  Injectable 2000 Unit(s) IV Push every 6 hours PRN For aPTT between 40 - 57  acetaminophen   Tablet 650 milliGRAM(s) Oral every 6 hours PRN For Temp greater than 38 C (100.4 F)  acetaminophen   Tablet. 650 milliGRAM(s) Oral every 6 hours PRN Mild Pain (1 - 3)      Vital Signs Last 24 Hrs  T(C): 36.7 (23 Aug 2017 12:24), Max: 39.1 (22 Aug 2017 18:34)  T(F): 98 (23 Aug 2017 12:24), Max: 102.3 (22 Aug 2017 18:34)  HR: 120 (23 Aug 2017 12:24) (94 - 147)  BP: 148/82 (23 Aug 2017 12:24) (100/60 - 148/82)  BP(mean): --  RR: 20 (23 Aug 2017 12:24) (17 - 32)  SpO2: 100% (23 Aug 2017 12:24) (88% - 100%)  CAPILLARY BLOOD GLUCOSE        I&O's Summary    22 Aug 2017 07:01  -  23 Aug 2017 07:00  --------------------------------------------------------  IN: 835 mL / OUT: 250 mL / NET: 585 mL    23 Aug 2017 07:01  -  23 Aug 2017 15:14  --------------------------------------------------------  IN: 560 mL / OUT: 0 mL / NET: 560 mL        PHYSICAL EXAM:  GENERAL: NAD, well-developed  HEAD:  Atraumatic, Normocephalic  EYES: EOMI, PERRLA, conjunctiva and sclera clear  NECK: Supple, No JVD  CHEST/LUNG: Clear to auscultation bilaterally; No wheeze, rt base bronchial sounds  HEART: Regular rate and rhythm; No murmurs, rubs, or gallops  ABDOMEN: Soft, Nontender, Nondistended; Bowel sounds present  EXTREMITIES:  2+ Peripheral Pulses, No clubbing, cyanosis, or edema  PSYCH: AAOx3  NEUROLOGY: non-focal  SKIN: No rashes or lesions    LABS:                        9.2    13.57 )-----------( 419      ( 23 Aug 2017 08:39 )             30.2     08-23    140  |  106  |  11  ----------------------------<  112<H>  5.0   |  20<L>  |  0.46<L>    Ca    7.4<L>      23 Aug 2017 08:35  Phos  2.6     08-23  Mg     1.4     08-23    TPro  6.1  /  Alb  2.1<L>  /  TBili  0.3  /  DBili  x   /  AST  38  /  ALT  27  /  AlkPhos  95  08-23    PT/INR - ( 22 Aug 2017 13:02 )   PT: 17.7 sec;   INR: 1.61 ratio         PTT - ( 23 Aug 2017 07:17 )  PTT:51.6 sec  CARDIAC MARKERS ( 23 Aug 2017 07:15 )  x     / <0.01 ng/mL / 21 U/L / x     / 1.0 ng/mL  CARDIAC MARKERS ( 22 Aug 2017 23:35 )  x     / x     / 18 U/L / x     / x      CARDIAC MARKERS ( 22 Aug 2017 23:29 )  x     / <0.01 ng/mL / x     / x     / 1.0 ng/mL  CARDIAC MARKERS ( 22 Aug 2017 13:02 )  x     / <0.01 ng/mL / x     / x     / x          Urinalysis Basic - ( 22 Aug 2017 23:24 )    Color: x / Appearance: x / SG: x / pH: x  Gluc: x / Ketone: x  / Bili: x / Urobili: x   Blood: x / Protein: x / Nitrite: x   Leuk Esterase: x / RBC: 0-2 /HPF / WBC 11-25 /HPF   Sq Epi: x / Non Sq Epi: OCC /HPF / Bacteria: Few /HPF        RADIOLOGY & ADDITIONAL TESTS:    Imaging Personally Reviewed:    Consultant(s) Notes Reviewed:      Care Discussed with Consultants/Other Providers:

## 2017-08-23 NOTE — CONSULT NOTE ADULT - ASSESSMENT
72 year old female with htn, fracture of Left arm, GERD, HLD admitted   for new onset  Afib with RVR/ sepsis work up 72 year old female with htn, fracture of Left arm, GERD, HLD admitted   for new onset  Afib with RVR/ PNA/ sepsis work up     1. Afib with RVR  new onset   likely in the setting of infection/ overall volume status   no chest pain or sob, asymptomatic   no evidence of acute ischemia/ACS/ or decom CHF   EKG reviewed + afib with RVR   For better rate control , dc toprol   start metropolol 25 mg BID PO   due to low sys bp, start dig 0.5 mg IVP X 2 doses q 6 hrs apart , first dose now   will reevaluate HR tomorrow and consider Cardizem if remains tachycardic   CHADS 1 , continue with hep gtt for now   give  cc bolus x 1   f.u echo eval for valvular disease   consider r/o PE  given recent fracture     2. PNA  med f/u   pending CT chest  IV abx   f/u BC     3. Fracture of Left arm   med f.u     dvt ppx 72 year old female with htn, fracture of Left arm, GERD, HLD admitted   for new onset  Afib with RVR/ PNA/ sepsis work up     1. Afib with RVR  new onset   likely in the setting of infection/ overall volume status   no chest pain or sob, asymptomatic   no evidence of acute ischemia/ACS/ or decom CHF   EKG reviewed + afib with RVR   For better rate control , dc toprol   start metropolol 25 mg BID PO   due to low sys bp, start dig 0.5 mg IVP X 2 doses q 6 hrs apart , first dose now   will reevaluate HR tomorrow and consider Cardizem if remains tachycardic   CHADS 1 , continue with hep gtt for now   give  cc bolus x 1  can give NS  at 55ml/ hr x 12 hrs after bolus   f.u echo eval for valvular disease   consider r/o PE  given recent fracture     2. PNA  med f/u   pending CT chest  IV abx   f/u BC     3. Fracture of Left arm   med f.u     dvt ppx

## 2017-08-23 NOTE — CONSULT NOTE ADULT - ATTENDING COMMENTS
Patient seen and examined.  Agree with above NP note.  pt seen at 1215 pm today   dtr at bedside   pt acutely more sob and hypoxic after using bedpan  pt with long tobacco history admitted with AF with RVR while awaiting outpt arm surgery  AF in 170-180's, sbp 180's, tachypneic  no chest pain  cxr yesterday with rll pna    ordered stat iv cardizem + iv drip  stat cxr  100 NRB  r/o worsening consolidation/new pulmonary edema  iv lasix pending cxr  eventual ct chest r/o malignancy  repeat ecg  bárbara's  iv needed will use iv amio  cont a/c with heparin  pulmo eval   micu eval if does not improve with rate control  d/w med attg Patient seen and examined.  Agree with above NP note.  pt seen at 1215 pm today   dtr at bedside   pt acutely more sob and hypoxic after using bedpan  pt with long tobacco history admitted with AF with RVR while awaiting outpt arm surgery  AF in 170-180's, sbp 180's, tachypneic  no chest pain  cxr yesterday with rll pna    ordered stat iv cardizem + iv drip  stat cxr  100 NRB  r/o worsening consolidation/new pulmonary edema  iv lasix pending cxr  eventual ct chest r/o malignancy  repeat ecg  bárbara's  iv needed will use iv amio  r/o pe per pulmo   cont a/c with heparin  pulmo eval   micu eval if does not improve with rate control  d/w med attg

## 2017-08-23 NOTE — CONSULT NOTE ADULT - SUBJECTIVE AND OBJECTIVE BOX
CARDIOLOGY CONSULT - Dr. Grant     CHIEF COMPLAINT: Afib     HPI:  72 year old female with Pmedhx as mentioned below sent from preop testing for Afib with RVR. Pt scheduled to undergo ORIF of left upper extremity tomorrow, now admitted for workup of Afib/rvr/sepsis. Denies chest pain, palpitations, shortness of breath, abdominal pain, fevers, chills, cough, dysuria, hematuria, melena, hematochezia. No headaches, dizziness, LOC. No sick contacts or travel history. Chronic smoking history over 50 pack years, quit in May 2017, never had CT scan. Reports decreased appetite and weight loss of 5-10lbs in the past month. Lives at home with family, ambulates without cane/ walker, ambulation has been limited since the fall/farcture.      PAST MEDICAL & SURGICAL HISTORY:  Displaced spiral fracture of shaft of humerus, left arm, subsequent encounter for fracture with routine healing  Insomnia  GERD (gastroesophageal reflux disease)  HLD (hyperlipidemia)  HTN (hypertension)          PREVIOUS DIAGNOSTIC TESTING:    [ x] Echocardiogram:  < from: Transthoracic Echocardiogram w/ Doppler (04.17.09 @ 20:56) >  Ejection Fraction: 75 %  < from: Transthoracic Echocardiogram w/ Doppler (04.17.09 @ 20:56) >  ------------------------------------------------------------------------  Conclusions:  1. Mild-moderate mitral regurgitation.  2. No  aortic valve regurgitation seen.  3. Normal aortic root. There is flow acceleration seen in  the aortic arch, the exact location of possible stenosis is  not know, vascular Doppler study may be helpful  4. Normal left ventricular systolic function. No Segmental  wall motion abnormalities EF = 75%  5. Normal right ventricular size and systolic function.  TAPSE = 20mm  6. Mild tricuspid regurgitation.  ------------------------------------------------------------------------    < end of copied text >      [ ]  Catheterization:    [ ] Stress Test:  	      Home Medications:   * Patient Currently Takes Medications as of 22-Aug-2017 12:20 documented in Prescription Writer  aspirin 81 mg oral tablet: 1 tab(s) orally once a day  metoprolol succinate 25 mg oral tablet, extended release: 1 tab(s) orally once a day  lisinopril 10 mg oral tablet: 1 tab(s) orally once a day  Lipitor 40 mg oral tablet: 1 tab(s) orally once a day (at bedtime)  PriLOSEC OTC 20 mg oral delayed release tablet: 1 tab(s) orally once a day  Ambien CR 12.5 mg oral tablet, extended release: 1 tab(s) orally once a day (at bedtime)  Adalat CC 60 mg oral tablet, extended release: 1 tab(s) orally once a day            MEDICATIONS:  MEDICATIONS  (STANDING):  heparin  Infusion.  Unit(s)/Hr (10 mL/Hr) IV Continuous <Continuous>  levoFLOXacin IVPB   IV Intermittent   levoFLOXacin IVPB 500 milliGRAM(s) IV Intermittent every 24 hours  pantoprazole    Tablet 40 milliGRAM(s) Oral before breakfast  metoprolol succinate ER 25 milliGRAM(s) Oral daily  atorvastatin 40 milliGRAM(s) Oral at bedtime  aspirin  chewable 81 milliGRAM(s) Oral daily  zolpidem 5 milliGRAM(s) Oral at bedtime  polyethylene glycol 3350 17 Gram(s) Oral daily  sodium chloride 0.9%. 1000 milliLiter(s) (55 mL/Hr) IV Continuous <Continuous>      FAMILY HISTORY:      SOCIAL HISTORY:    [ ] Non-smoker  [ x ] Smoker former, Quit in May of 2017, smoked for 50 years   [ ] Alcohol    Allergies    No Known Allergies    Intolerances    	    REVIEW OF SYSTEMS:  CONSTITUTIONAL: No fever, weight loss, or fatigue  EYES: No eye pain, visual disturbances, or discharge  ENMT:  No difficulty hearing, tinnitus, vertigo; No sinus or throat pain  NECK: No pain or stiffness  RESPIRATORY: No cough, wheezing, chills or hemoptysis; No Shortness of Breath  CARDIOVASCULAR: No chest pain, palpitations, passing out, dizziness, or leg swelling  GASTROINTESTINAL: No abdominal or epigastric pain. No nausea, vomiting, or hematemesis; No diarrhea or constipation. No melena or hematochezia.  GENITOURINARY: No dysuria, frequency, hematuria, or incontinence  NEUROLOGICAL: No headaches, memory loss, loss of strength, numbness, or tremors  SKIN: No itching, burning, rashes, or lesions   	    [ ] All others negative	  [ ] Unable to obtain    PHYSICAL EXAM:  T(C): 36.8 (08-23-17 @ 04:47), Max: 98 (08-22-17 @ 13:33)  HR: 94 (08-23-17 @ 04:47) (94 - 160)  BP: 100/60 (08-23-17 @ 04:47) (100/60 - 153/83)  RR: 21 (08-23-17 @ 04:47) (17 - 32)  SpO2: 97% (08-23-17 @ 04:47) (88% - 100%)  Wt(kg): --  I&O's Summary    22 Aug 2017 07:01  -  23 Aug 2017 07:00  --------------------------------------------------------  IN: 835 mL / OUT: 250 mL / NET: 585 mL        Appearance: Normal	  Psychiatry: A & O x 3, Mood & affect appropriate  HEENT:   Normal oral mucosa, PERRL, EOMI	  Lymphatic: No lymphadenopathy  Cardiovascular: Normal S1 S2,RRR, No JVD, No murmurs  Respiratory: Lungs clear to auscultation	  Gastrointestinal:  Soft, Non-tender, + BS	  Skin: No rashes, No ecchymoses, No cyanosis	  Neurologic: Non-focal  Extremities: Normal range of motion, No clubbing, cyanosis or edema  Vascular: Peripheral pulses palpable 2+ bilaterally    TELEMETRY: 	Afib with RVR - 150    ECG:  	Afib with RVR   RADIOLOGY:  OTHER: 	  < from: Xray Chest 1 View AP/PA (08.22.17 @ 13:07) >    IMPRESSION:   Right lower lobar pneumonia. Right pleural effusion.              	  LABS:	 	    CARDIAC MARKERS:    trop x 3 negative                           10.7   17.1  )-----------( 463      ( 23 Aug 2017 01:26 )             33.1     08-22    140  |  99  |  11  ----------------------------<  120<H>  3.2<L>   |  23  |  0.52    Ca    8.2<L>      22 Aug 2017 19:08  Phos  2.4     08-22  Mg     1.6     08-22    TPro  7.7  /  Alb  3.2<L>  /  TBili  0.5  /  DBili  x   /  AST  22  /  ALT  27  /  AlkPhos  105  08-22    PT/INR - ( 22 Aug 2017 13:02 )   PT: 17.7 sec;   INR: 1.61 ratio         PTT - ( 23 Aug 2017 07:17 )  PTT:51.6 sec  proBNP:   Lipid Profile:   HgA1c:   TSH: CARDIOLOGY CONSULT - Dr. Grant     CHIEF COMPLAINT: Afib     HPI:  72 year old female with Pmedhx as mentioned below sent from preop testing for Afib with RVR. Pt scheduled to undergo ORIF of left upper extremity tomorrow, now admitted for workup of Afib with rvr/sepsis. Denies chest pain, palpitations, shortness of breath, abdominal pain, fevers, chills, cough, dysuria, hematuria, melena, hematochezia. No headaches, dizziness, LOC. No sick contacts or travel history. Chronic smoking history over 50 pack years, quit in May 2017, never had CT scan. Reports decreased appetite and weight loss of 5-10lbs in the past month and now c.o diarrhea . Most recent echo with normal lv sys fx, mild- mod MR. ROS otherwise negative         PAST MEDICAL & SURGICAL HISTORY:  Displaced spiral fracture of shaft of humerus, left arm, subsequent encounter for fracture with routine healing  Insomnia  GERD (gastroesophageal reflux disease)  HLD (hyperlipidemia)  HTN (hypertension)          PREVIOUS DIAGNOSTIC TESTING:    [ x] Echocardiogram:  < from: Transthoracic Echocardiogram w/ Doppler (04.17.09 @ 20:56) >  Ejection Fraction: 75 %  < from: Transthoracic Echocardiogram w/ Doppler (04.17.09 @ 20:56) >  ------------------------------------------------------------------------  Conclusions:  1. Mild-moderate mitral regurgitation.  2. No  aortic valve regurgitation seen.  3. Normal aortic root. There is flow acceleration seen in  the aortic arch, the exact location of possible stenosis is  not know, vascular Doppler study may be helpful  4. Normal left ventricular systolic function. No Segmental  wall motion abnormalities EF = 75%  5. Normal right ventricular size and systolic function.  TAPSE = 20mm  6. Mild tricuspid regurgitation.  ------------------------------------------------------------------------    < end of copied text >      [ ]  Catheterization:    [ ] Stress Test:  	      Home Medications:   * Patient Currently Takes Medications as of 22-Aug-2017 12:20 documented in Prescription Writer  aspirin 81 mg oral tablet: 1 tab(s) orally once a day  metoprolol succinate 25 mg oral tablet, extended release: 1 tab(s) orally once a day  lisinopril 10 mg oral tablet: 1 tab(s) orally once a day  Lipitor 40 mg oral tablet: 1 tab(s) orally once a day (at bedtime)  PriLOSEC OTC 20 mg oral delayed release tablet: 1 tab(s) orally once a day  Ambien CR 12.5 mg oral tablet, extended release: 1 tab(s) orally once a day (at bedtime)  Adalat CC 60 mg oral tablet, extended release: 1 tab(s) orally once a day            MEDICATIONS:  MEDICATIONS  (STANDING):  heparin  Infusion.  Unit(s)/Hr (10 mL/Hr) IV Continuous <Continuous>  levoFLOXacin IVPB   IV Intermittent   levoFLOXacin IVPB 500 milliGRAM(s) IV Intermittent every 24 hours  pantoprazole    Tablet 40 milliGRAM(s) Oral before breakfast  metoprolol succinate ER 25 milliGRAM(s) Oral daily  atorvastatin 40 milliGRAM(s) Oral at bedtime  aspirin  chewable 81 milliGRAM(s) Oral daily  zolpidem 5 milliGRAM(s) Oral at bedtime  polyethylene glycol 3350 17 Gram(s) Oral daily  sodium chloride 0.9%. 1000 milliLiter(s) (55 mL/Hr) IV Continuous <Continuous>      FAMILY HISTORY:      SOCIAL HISTORY:    [ ] Non-smoker  [ x ] Smoker former, Quit in May of 2017, smoked for 50 years   [ ] Alcohol    Allergies    No Known Allergies    Intolerances    	    REVIEW OF SYSTEMS:  CONSTITUTIONAL: No fever, weight loss, or fatigue  EYES: No eye pain, visual disturbances, or discharge  ENMT:  No difficulty hearing, tinnitus, vertigo; No sinus or throat pain  NECK: No pain or stiffness  RESPIRATORY: No cough, wheezing, chills or hemoptysis; No Shortness of Breath  CARDIOVASCULAR: No chest pain, palpitations, passing out, dizziness, or leg swelling  GASTROINTESTINAL: No abdominal or epigastric pain. No nausea, vomiting, or hematemesis; No diarrhea or constipation. No melena or hematochezia.  GENITOURINARY: No dysuria, frequency, hematuria, or incontinence  NEUROLOGICAL: No headaches, memory loss, loss of strength, numbness, or tremors  SKIN: No itching, burning, rashes, or lesions   	    [x ] All others negative	  [ ] Unable to obtain    PHYSICAL EXAM:  T(C): 36.8 (08-23-17 @ 04:47), Max: 98 (08-22-17 @ 13:33)  HR: 94 (08-23-17 @ 04:47) (94 - 160)  BP: 100/60 (08-23-17 @ 04:47) (100/60 - 153/83)  RR: 21 (08-23-17 @ 04:47) (17 - 32)  SpO2: 97% (08-23-17 @ 04:47) (88% - 100%)  Wt(kg): --  I&O's Summary    22 Aug 2017 07:01  -  23 Aug 2017 07:00  --------------------------------------------------------  IN: 835 mL / OUT: 250 mL / NET: 585 mL        Appearance: Normal	  Psychiatry: A & O x 3, Mood & affect appropriate  HEENT:   Normal oral mucosa, PERRL, EOMI	  Lymphatic: No lymphadenopathy  Cardiovascular: Normal S1 S2,irregular, tachycardic + murmur   Respiratory: LLL diminished, Right lung + rales   Gastrointestinal:  Soft, Non-tender, + BS	  Skin: No rashes, No ecchymoses, No cyanosis	  Neurologic: Non-focal  Extremities: Left arm in sling .  No clubbing, cyanosis or edema  Vascular: Peripheral pulses palpable 2+ bilaterally    TELEMETRY: 	Afib with RVR - 150    ECG:  	Afib with RVR   RADIOLOGY:  OTHER: 	  < from: Xray Chest 1 View AP/PA (08.22.17 @ 13:07) >    IMPRESSION:   Right lower lobar pneumonia. Right pleural effusion.              	  LABS:	 	    CARDIAC MARKERS:    trop x 3 negative                           10.7   17.1  )-----------( 463      ( 23 Aug 2017 01:26 )             33.1     08-22    140  |  99  |  11  ----------------------------<  120<H>  3.2<L>   |  23  |  0.52    Ca    8.2<L>      22 Aug 2017 19:08  Phos  2.4     08-22  Mg     1.6     08-22    TPro  7.7  /  Alb  3.2<L>  /  TBili  0.5  /  DBili  x   /  AST  22  /  ALT  27  /  AlkPhos  105  08-22    PT/INR - ( 22 Aug 2017 13:02 )   PT: 17.7 sec;   INR: 1.61 ratio         PTT - ( 23 Aug 2017 07:17 )  PTT:51.6 sec  proBNP:   Lipid Profile:   HgA1c:   TSH:

## 2017-08-23 NOTE — PROGRESS NOTE ADULT - PROBLEM SELECTOR PLAN 1
-Pt found to be in Afib with RVR likely in the setting of sepsis secondary to RLL pna  -Received IV cardizem and metoprolol in ED, rate currently in 90s on tele monitoring  -Will treat uderlying PNA  -If tachycardic overnight can give IV lopressor based on BP readings  -Keep K>4 and Mag >2  -Check echo  -Continue tele monitoring  -First set of cardiac enzymes negative, check serial troponins   -Check tsh in am  -Continue IV heparin, monitor ptt  -Pt will need good adjustment of home regimen of meds, may require uptitration of toprol xl or addition of cardizem depending on rate monitoring on tele

## 2017-08-23 NOTE — CONSULT NOTE ADULT - SUBJECTIVE AND OBJECTIVE BOX
[facility] - [facility]  CONSULT NOTE    HPI: 72 year old gentlewoman, former smoker, s/p a fall while walking her dog 2017 resulting in a left humerus fracture treated with immobilization. Due to non-healing the patient was being seen in pre-surgical testing in anticipation of an ORIF. There she was found to have atrial fibrillation with RVR. At that time, she had no shortness of breath, chest pain/pressure or palpitations. A CXR revealed a large right lower lobe process. The patient has been on heparin gtt. This morning, while on the bedpan, the patient suddenly developed shortness of breath with increased heart rate. She is short of breath just sitting in bed and describes a heaviness in her chest without radiation. She has had no fevers, chills or sweats. No cough or sputum production, chest congestion or wheeze. She is hemodynamically stable.    PMHX:  Displaced spiral fracture of shaft of humerus, left arm, subsequent encounter for fracture with routine healing  Insomnia  GERD (gastroesophageal reflux disease)  HLD (hyperlipidemia)  HTN (hypertension)      PSHX:      FAMILY HISTORY: reviewed - no significant family history in first degree relatives unless otherwise noted      SOCIAL HISTORY: worked in [employer]; former smoker    Pulmonary Medications:       Antimicrobials:  levoFLOXacin IVPB   IV Intermittent   levoFLOXacin IVPB 500 milliGRAM(s) IV Intermittent every 24 hours      Cardiology:  digoxin  Injectable 0.5 milliGRAM(s) IV Push every 6 hours  metoprolol 25 milliGRAM(s) Oral two times a day  diltiazem Infusion 5 mG/Hr IV Continuous <Continuous>      Other:  heparin  Infusion.  Unit(s)/Hr IV Continuous <Continuous>  heparin  Injectable 4000 Unit(s) IV Push every 6 hours PRN  heparin  Injectable 2000 Unit(s) IV Push every 6 hours PRN  pantoprazole    Tablet 40 milliGRAM(s) Oral before breakfast  atorvastatin 40 milliGRAM(s) Oral at bedtime  aspirin  chewable 81 milliGRAM(s) Oral daily  zolpidem 5 milliGRAM(s) Oral at bedtime  acetaminophen   Tablet 650 milliGRAM(s) Oral every 6 hours PRN  acetaminophen   Tablet. 650 milliGRAM(s) Oral every 6 hours PRN  polyethylene glycol 3350 17 Gram(s) Oral daily  sodium chloride 0.9%. 1000 milliLiter(s) IV Continuous <Continuous>  magnesium sulfate  IVPB 2 Gram(s) IV Intermittent once      Allergies    No Known Allergies      HOME MEDICATIONS: see  H & P    REVIEW OF SYSTEMS:  Constitutional: As per HPI  HEENT: Within normal limits  CV: As per HPI  Resp: As per HPI  GI: Within normal limits   : Within normal limits  Musculoskeletal: As per HPI  Skin: Within normal limits  Neurological: Within normal limits  Psychiatric: Within normal limits  Endocrine: Within normal limits  Hematologic/Lymphatic: Within normal limits  Allergic/Immunologic: Within normal limits    [x ] All other systems negative      OBJECTIVE:      I&O's Detail    22 Aug 2017 07:01  -  23 Aug 2017 07:00  --------------------------------------------------------  IN:    heparin  Infusion.: 100 mL    Oral Fluid: 240 mL    sodium chloride 0.9%.: 495 mL  Total IN: 835 mL    OUT:    Voided: 250 mL  Total OUT: 250 mL    Total NET: 585 mL      Daily Height in cm: 160.02 (22 Aug 2017 20:19)    Daily Weight in k.1 (22 Aug 2017 20:19)      PHYSICAL EXAM:  ICU Vital Signs Last 24 Hrs  T(C): 36.7 (23 Aug 2017 12:24), Max: 98 (22 Aug 2017 13:33)  T(F): 98 (23 Aug 2017 12:24), Max: 208.4 (22 Aug 2017 13:33)  HR: 120 (23 Aug 2017 12:24) (94 - 160)  BP: 148/82 (23 Aug 2017 12:24) (100/60 - 153/83)  BP(mean): --  ABP: --  ABP(mean): --  RR: 20 (23 Aug 2017 12:24) (17 - 32)  SpO2: 100% (23 Aug 2017 12:24) (88% - 100%)            sat 85% with minimal movement on 4lpm - 99% at rest on 100% NRB  General: Awake. Alert. Cooperative. Anxious. Appears stated age 	  HEENT:   Atraumatic. Normocephalic. Anicteric. Normal oral mucosa, PERRL, EOMI  Neck: Supple. Trachea midline. Thyroid without enlargement/tenderness/nodules. No carotid bruit. No JVD	  Cardiovascular: Irregularly irregular rate and rhythm. prominent  S1 S2 prominent; II/VI systolic murmur  Respiratory: Tachypneic. Decreased breath sounds right base with dullness and rales  Abdomen: Soft. Non-tender. Non-distended. No organomegaly. No masses. Normal bowel sounds	  Extremities: Warm to touch. No clubbing or cyanosis. No pedal edema.  Pulses: 2+ peripheral pulses all extremities	  Skin: Normal skin color. No rashes or lesions. No ecchymoses, No cyanosis. Warm to touch  Lymph Nodes: Cervical, supraclavicular and axillary nodes normal  Neurological: Motor and sensory examination equal and normal. A and O x 3  Psychiatry: Appropriate mood and affect.      LABS:                          9.2    13.57 )-----------( 419      ( 23 Aug 2017 08:39 )             30.2                         10.7   17.1  )-----------( 463      ( 23 Aug 2017 01:26 )             33.1         140  |  106  |  11  ----------------------------<  112<H>  5.0   |  20<L>  |  0.46<L>        140  |  99  |  11  ----------------------------<  120<H>  3.2<L>   |  23  |  0.52    Ca      7.4<L>          Ca      8.2<L>          Phos    2.6         Phos    2.4           Mg       1.4         Mg       1.6         TPro  6.1  /  Alb  2.1<L>  /  TBili  0.3  /  DBili  x   /  AST  38  /  ALT  27  /  AlkPhos  95      TPro  7.7  /  Alb  3.2<L>  /  TBili  0.5  /  DBili  x   /  AST  22  /  ALT  27  /  AlkPhos  105      PT/INR - ( 22 Aug 2017 13:02 )   PT: 17.7 sec;   INR: 1.61 ratio         PTT - ( 23 Aug 2017 07:17 )  PTT:51.6 sec    CARDIAC MARKERS ( 23 Aug 2017 07:15 )  x     / <0.01 ng/mL / 21 U/L / x     / 1.0 ng/mL  CARDIAC MARKERS ( 22 Aug 2017 23:35 )  x     / x     / 18 U/L / x     / x      CARDIAC MARKERS ( 22 Aug 2017 23:29 )  x     / <0.01 ng/mL / x     / x     / 1.0 ng/mL  CARDIAC MARKERS ( 22 Aug 2017 13:02 )  x     / <0.01 ng/mL / x     / x     / x              MICROBIOLOGY:   Urinalysis Basic - ( 22 Aug 2017 23:24 )    Color: x / Appearance: x / SG: x / pH: x  Gluc: x / Ketone: x  / Bili: x / Urobili: x   Blood: x / Protein: x / Nitrite: x   Leuk Esterase: x / RBC: 0-2 /HPF / WBC 11-25 /HPF   Sq Epi: x / Non Sq Epi: OCC /HPF / Bacteria: Few /HPF        RADIOLOGY:  [x ] Chest radiographs reviewed and interpreted by me    < from: Xray Chest 1 View AP/PA (17 @ 13:07) >    EXAM:  CHEST SINGLE AP OR PA                            PROCEDURE DATE:  2017            INTERPRETATION:    CLINICAL INDICATION: A. fib    TECHNIQUE: Portable frontal view of the chest.    COMPARISON: Frontal chest radiograph from 2009.    FINDINGS:     There are calcifications of the aortic arch.  There is opacification of the right lower lung field with silhouetting of   the right hemithorax, consistent with right lower lobar pneumonia. Right   pleural effusion.  No pneumothorax.  No acute osseous abnormality.      IMPRESSION:   Right lower lobar pneumonia. Right pleural effusion.      JOSHUA RICHTER M.D., RADIOLOGY RESIDENT  This document has been electronically signed.  HOLLEY KEBEDE M.D., ATTENDING RADIOLOGIST  This document has been electronically signed. Aug 22 2017  2:28PM      < end of copied text >  --------------------------------------------------------------------------------------------------------- [facility] - [facility]  CONSULT NOTE    HPI: 72 year old gentlewoman, former smoker stopping 3 months ago at the time of her injury at the advice of an orthopedic surgeon. She fell while walking her dog 2017 resulting in a left humerus fracture treated with immobilization. Due to non-healing the patient was being seen in pre-surgical testing in anticipation of an ORIF. There she was found to have atrial fibrillation with RVR. At that time, she had no shortness of breath, chest pain/pressure or palpitations. A CXR revealed a large right lower lobe process. The patient has been on heparin gtt. This morning, while on the bedpan, the patient suddenly developed shortness of breath with increased heart rate. She is short of breath just sitting in bed and describes a heaviness in her chest without radiation. She has had no fevers, chills or sweats. No cough or sputum production, chest congestion or wheeze. She is hemodynamically stable.    PMHX:  Displaced spiral fracture of shaft of humerus, left arm, subsequent encounter for fracture with routine healing  Insomnia  GERD (gastroesophageal reflux disease)  HLD (hyperlipidemia)  HTN (hypertension)      PSHX:      FAMILY HISTORY: reviewed - no significant family history in first degree relatives unless otherwise noted      SOCIAL HISTORY: worked in [email or web address]; former smoker    Pulmonary Medications:       Antimicrobials:  levoFLOXacin IVPB   IV Intermittent   levoFLOXacin IVPB 500 milliGRAM(s) IV Intermittent every 24 hours      Cardiology:  digoxin  Injectable 0.5 milliGRAM(s) IV Push every 6 hours  metoprolol 25 milliGRAM(s) Oral two times a day  diltiazem Infusion 5 mG/Hr IV Continuous <Continuous>      Other:  heparin  Infusion.  Unit(s)/Hr IV Continuous <Continuous>  heparin  Injectable 4000 Unit(s) IV Push every 6 hours PRN  heparin  Injectable 2000 Unit(s) IV Push every 6 hours PRN  pantoprazole    Tablet 40 milliGRAM(s) Oral before breakfast  atorvastatin 40 milliGRAM(s) Oral at bedtime  aspirin  chewable 81 milliGRAM(s) Oral daily  zolpidem 5 milliGRAM(s) Oral at bedtime  acetaminophen   Tablet 650 milliGRAM(s) Oral every 6 hours PRN  acetaminophen   Tablet. 650 milliGRAM(s) Oral every 6 hours PRN  polyethylene glycol 3350 17 Gram(s) Oral daily  sodium chloride 0.9%. 1000 milliLiter(s) IV Continuous <Continuous>  magnesium sulfate  IVPB 2 Gram(s) IV Intermittent once      Allergies    No Known Allergies      HOME MEDICATIONS: see  H & P    REVIEW OF SYSTEMS:  Constitutional: As per HPI  HEENT: Within normal limits  CV: As per HPI  Resp: As per HPI  GI: Within normal limits   : Within normal limits  Musculoskeletal: As per HPI  Skin: Within normal limits  Neurological: Within normal limits  Psychiatric: Within normal limits  Endocrine: Within normal limits  Hematologic/Lymphatic: Within normal limits  Allergic/Immunologic: Within normal limits    [x ] All other systems negative      OBJECTIVE:      I&O's Detail    22 Aug 2017 07:01  -  23 Aug 2017 07:00  --------------------------------------------------------  IN:    heparin  Infusion.: 100 mL    Oral Fluid: 240 mL    sodium chloride 0.9%.: 495 mL  Total IN: 835 mL    OUT:    Voided: 250 mL  Total OUT: 250 mL    Total NET: 585 mL      Daily Height in cm: 160.02 (22 Aug 2017 20:19)    Daily Weight in k.1 (22 Aug 2017 20:19)      PHYSICAL EXAM:  ICU Vital Signs Last 24 Hrs  T(C): 36.7 (23 Aug 2017 12:24), Max: 98 (22 Aug 2017 13:33)  T(F): 98 (23 Aug 2017 12:24), Max: 208.4 (22 Aug 2017 13:33)  HR: 120 (23 Aug 2017 12:24) (94 - 160)  BP: 148/82 (23 Aug 2017 12:24) (100/60 - 153/83)  BP(mean): --  ABP: --  ABP(mean): --  RR: 20 (23 Aug 2017 12:24) (17 - 32)  SpO2: 100% (23 Aug 2017 12:24) (88% - 100%)            sat 85% with minimal movement on 4lpm - 99% at rest on 100% NRB  General: Awake. Alert. Cooperative. Anxious. Appears stated age 	  HEENT:   Atraumatic. Normocephalic. Anicteric. Normal oral mucosa, PERRL, EOMI  Neck: Supple. Trachea midline. Thyroid without enlargement/tenderness/nodules. No carotid bruit. No JVD	  Cardiovascular: Irregularly irregular rate and rhythm. prominent  S1 S2 prominent; II/VI systolic murmur  Respiratory: Tachypneic. Decreased breath sounds right base with dullness and rales  Abdomen: Soft. Non-tender. Non-distended. No organomegaly. No masses. Normal bowel sounds	  Extremities: Warm to touch. No clubbing or cyanosis. No pedal edema.  Pulses: 2+ peripheral pulses all extremities	  Skin: Normal skin color. No rashes or lesions. No ecchymoses, No cyanosis. Warm to touch  Lymph Nodes: Cervical, supraclavicular and axillary nodes normal  Neurological: Motor and sensory examination equal and normal. A and O x 3  Psychiatry: Appropriate mood and affect.      LABS:                          9.2    13.57 )-----------( 419      ( 23 Aug 2017 08:39 )             30.2                         10.7   17.1  )-----------( 463      ( 23 Aug 2017 01:26 )             33.1         140  |  106  |  11  ----------------------------<  112<H>  5.0   |  20<L>  |  0.46<L>        140  |  99  |  11  ----------------------------<  120<H>  3.2<L>   |  23  |  0.52    Ca      7.4<L>          Ca      8.2<L>          Phos    2.6         Phos    2.4           Mg       1.4         Mg       1.6         TPro  6.1  /  Alb  2.1<L>  /  TBili  0.3  /  DBili  x   /  AST  38  /  ALT  27  /  AlkPhos  95      TPro  7.7  /  Alb  3.2<L>  /  TBili  0.5  /  DBili  x   /  AST  22  /  ALT  27  /  AlkPhos  105      PT/INR - ( 22 Aug 2017 13:02 )   PT: 17.7 sec;   INR: 1.61 ratio         PTT - ( 23 Aug 2017 07:17 )  PTT:51.6 sec    CARDIAC MARKERS ( 23 Aug 2017 07:15 )  x     / <0.01 ng/mL / 21 U/L / x     / 1.0 ng/mL  CARDIAC MARKERS ( 22 Aug 2017 23:35 )  x     / x     / 18 U/L / x     / x      CARDIAC MARKERS ( 22 Aug 2017 23:29 )  x     / <0.01 ng/mL / x     / x     / 1.0 ng/mL  CARDIAC MARKERS ( 22 Aug 2017 13:02 )  x     / <0.01 ng/mL / x     / x     / x              MICROBIOLOGY:   Urinalysis Basic - ( 22 Aug 2017 23:24 )    Color: x / Appearance: x / SG: x / pH: x  Gluc: x / Ketone: x  / Bili: x / Urobili: x   Blood: x / Protein: x / Nitrite: x   Leuk Esterase: x / RBC: 0-2 /HPF / WBC 11-25 /HPF   Sq Epi: x / Non Sq Epi: OCC /HPF / Bacteria: Few /HPF        RADIOLOGY:  [x ] Chest radiographs reviewed and interpreted by me    < from: Xray Chest 1 View AP/PA (17 @ 13:07) >    EXAM:  CHEST SINGLE AP OR PA                            PROCEDURE DATE:  2017            INTERPRETATION:    CLINICAL INDICATION: A. fib    TECHNIQUE: Portable frontal view of the chest.    COMPARISON: Frontal chest radiograph from 2009.    FINDINGS:     There are calcifications of the aortic arch.  There is opacification of the right lower lung field with silhouetting of   the right hemithorax, consistent with right lower lobar pneumonia. Right   pleural effusion.  No pneumothorax.  No acute osseous abnormality.      IMPRESSION:   Right lower lobar pneumonia. Right pleural effusion.      JOSHUA RICHTER M.D., RADIOLOGY RESIDENT  This document has been electronically signed.  HOLLEY KEBEDE M.D., ATTENDING RADIOLOGIST  This document has been electronically signed. Aug 22 2017  2:28PM      < end of copied text >  ---------------------------------------------------------------------------------------------------------

## 2017-08-23 NOTE — PROGRESS NOTE ADULT - ASSESSMENT
72 year old female with hx of HTN, HLD, gerd, insomnia s/p mechanical fall in 5/2017 with left humeral fracture sent from preop testing for Afib with  RVR. resp failure sepsis, poss pna .

## 2017-08-24 DIAGNOSIS — J90 PLEURAL EFFUSION, NOT ELSEWHERE CLASSIFIED: ICD-10-CM

## 2017-08-24 LAB
ANION GAP SERPL CALC-SCNC: 12 MMOL/L — SIGNIFICANT CHANGE UP (ref 5–17)
APTT BLD: 28.9 SEC — SIGNIFICANT CHANGE UP (ref 27.5–37.4)
B PERT IGG+IGM PNL SER: ABNORMAL
BUN SERPL-MCNC: 4 MG/DL — LOW (ref 7–23)
CALCIUM SERPL-MCNC: 8.4 MG/DL — SIGNIFICANT CHANGE UP (ref 8.4–10.5)
CHLORIDE SERPL-SCNC: 103 MMOL/L — SIGNIFICANT CHANGE UP (ref 96–108)
CO2 SERPL-SCNC: 23 MMOL/L — SIGNIFICANT CHANGE UP (ref 22–31)
COLOR FLD: SIGNIFICANT CHANGE UP
CREAT SERPL-MCNC: 0.37 MG/DL — LOW (ref 0.5–1.3)
CULTURE RESULTS: NO GROWTH — SIGNIFICANT CHANGE UP
FLUID INTAKE SUBSTANCE CLASS: SIGNIFICANT CHANGE UP
FLUID SEGMENTED GRANULOCYTES: SIGNIFICANT CHANGE UP
GLUCOSE SERPL-MCNC: 110 MG/DL — HIGH (ref 70–99)
GRAM STN FLD: SIGNIFICANT CHANGE UP
HCT VFR BLD CALC: 30.2 % — LOW (ref 34.5–45)
HGB BLD-MCNC: 9.4 G/DL — LOW (ref 11.5–15.5)
INR BLD: 1.54 RATIO — HIGH (ref 0.88–1.16)
MCHC RBC-ENTMCNC: 27.6 PG — SIGNIFICANT CHANGE UP (ref 27–34)
MCHC RBC-ENTMCNC: 31.1 GM/DL — LOW (ref 32–36)
MCV RBC AUTO: 88.6 FL — SIGNIFICANT CHANGE UP (ref 80–100)
PLATELET # BLD AUTO: 438 K/UL — HIGH (ref 150–400)
POTASSIUM SERPL-MCNC: 4.1 MMOL/L — SIGNIFICANT CHANGE UP (ref 3.5–5.3)
POTASSIUM SERPL-SCNC: 4.1 MMOL/L — SIGNIFICANT CHANGE UP (ref 3.5–5.3)
PROTHROM AB SERPL-ACNC: 17.6 SEC — HIGH (ref 10–13.1)
RBC # BLD: 3.41 M/UL — LOW (ref 3.8–5.2)
RBC # FLD: 14.3 % — SIGNIFICANT CHANGE UP (ref 10.3–14.5)
RCV VOL RI: 24 /UL — HIGH (ref 0–5)
SODIUM SERPL-SCNC: 138 MMOL/L — SIGNIFICANT CHANGE UP (ref 135–145)
SPECIMEN SOURCE: SIGNIFICANT CHANGE UP
SPECIMEN SOURCE: SIGNIFICANT CHANGE UP
TOTAL NUCLEATED CELL COUNT, BODY FLUID: HIGH /UL (ref 0–5)
TUBE TYPE: SIGNIFICANT CHANGE UP
WBC # BLD: 14.13 K/UL — HIGH (ref 3.8–10.5)
WBC # FLD AUTO: 14.13 K/UL — HIGH (ref 3.8–10.5)

## 2017-08-24 PROCEDURE — 88112 CYTOPATH CELL ENHANCE TECH: CPT | Mod: 26

## 2017-08-24 PROCEDURE — 88305 TISSUE EXAM BY PATHOLOGIST: CPT | Mod: 26

## 2017-08-24 PROCEDURE — 99223 1ST HOSP IP/OBS HIGH 75: CPT | Mod: 57

## 2017-08-24 PROCEDURE — 71010: CPT | Mod: 26

## 2017-08-24 RX ORDER — HEPARIN SODIUM 5000 [USP'U]/ML
1100 INJECTION INTRAVENOUS; SUBCUTANEOUS
Qty: 25000 | Refills: 0 | Status: DISCONTINUED | OUTPATIENT
Start: 2017-08-24 | End: 2017-08-25

## 2017-08-24 RX ORDER — CHLORHEXIDINE GLUCONATE 213 G/1000ML
1 SOLUTION TOPICAL
Qty: 0 | Refills: 0 | Status: COMPLETED | OUTPATIENT
Start: 2017-08-24 | End: 2017-08-25

## 2017-08-24 RX ORDER — HEPARIN SODIUM 5000 [USP'U]/ML
4000 INJECTION INTRAVENOUS; SUBCUTANEOUS EVERY 6 HOURS
Qty: 0 | Refills: 0 | Status: DISCONTINUED | OUTPATIENT
Start: 2017-08-24 | End: 2017-08-25

## 2017-08-24 RX ORDER — HEPARIN SODIUM 5000 [USP'U]/ML
2000 INJECTION INTRAVENOUS; SUBCUTANEOUS EVERY 6 HOURS
Qty: 0 | Refills: 0 | Status: DISCONTINUED | OUTPATIENT
Start: 2017-08-24 | End: 2017-08-25

## 2017-08-24 RX ADMIN — Medication 5 MG/HR: at 06:24

## 2017-08-24 RX ADMIN — CHLORHEXIDINE GLUCONATE 1 APPLICATION(S): 213 SOLUTION TOPICAL at 19:00

## 2017-08-24 RX ADMIN — Medication 25 MILLIGRAM(S): at 06:25

## 2017-08-24 RX ADMIN — ZOLPIDEM TARTRATE 5 MILLIGRAM(S): 10 TABLET ORAL at 21:29

## 2017-08-24 RX ADMIN — HEPARIN SODIUM 1100 UNIT(S)/HR: 5000 INJECTION INTRAVENOUS; SUBCUTANEOUS at 18:22

## 2017-08-24 RX ADMIN — SODIUM CHLORIDE 100 MILLILITER(S): 9 INJECTION INTRAMUSCULAR; INTRAVENOUS; SUBCUTANEOUS at 19:51

## 2017-08-24 RX ADMIN — Medication 81 MILLIGRAM(S): at 18:22

## 2017-08-24 RX ADMIN — Medication 650 MILLIGRAM(S): at 23:28

## 2017-08-24 RX ADMIN — Medication 25 MILLIGRAM(S): at 18:22

## 2017-08-24 RX ADMIN — PANTOPRAZOLE SODIUM 40 MILLIGRAM(S): 20 TABLET, DELAYED RELEASE ORAL at 06:23

## 2017-08-24 RX ADMIN — Medication 650 MILLIGRAM(S): at 19:50

## 2017-08-24 RX ADMIN — ATORVASTATIN CALCIUM 40 MILLIGRAM(S): 80 TABLET, FILM COATED ORAL at 21:30

## 2017-08-24 RX ADMIN — SODIUM CHLORIDE 100 MILLILITER(S): 9 INJECTION INTRAMUSCULAR; INTRAVENOUS; SUBCUTANEOUS at 00:06

## 2017-08-24 NOTE — CONSULT NOTE ADULT - ATTENDING COMMENTS
patient s/p fall in may planned for left ORIF but found to be in rapid afib and right pleural effusion. thoracentesis demonstrated extremely thick purulent fluid - patient was planned for vats for 8/25 possible thoracotomy with decortication.

## 2017-08-24 NOTE — PROGRESS NOTE ADULT - ASSESSMENT
71 y/o female h/o HTN, HLD Left spiral humerus fracture s/p mechanical fall. Presented with new onset afib at [facility] for ORIF of LUE. CT chest demonstrated right lower hemithorax   collection.  8/24 U/S guided thoracentesis yields 100cc thick purulent fluid 71 y/o female h/o HTN, HLD Left spiral humerus fracture s/p mechanical fall. Presented with new onset afib at [facility] for ORIF left Humeral nonunion . CT chest demonstrated right lower hemithorax collection.  8/24 Right sided thoracentesis yields 100cc thick purulent fluid

## 2017-08-24 NOTE — PROGRESS NOTE ADULT - ASSESSMENT
72 year old female with htn, fracture of Left arm, GERD, HLD admitted   for new onset  Afib with RVR/ PNA/ sepsis work up     1. Afib with RVR  new onset   likely in the setting of infection/ overall volume status   no chest pain or sob, asymptomatic   no evidence of acute ischemia/ACS/ or decom CHF   HR better controlled today   continue with metropolol 25 mg BID PO   continue with cardizem gtt   CHADS 1 , continue with hep gtt for now   can hold for thoracentesis, restart after procedure   f.u echo eval for valvular disease   CTangio neg for PE / right pleural effusion with pleural thickening     2. PNA  per patient sob improving, currently on NC O2  med f/u   CT angio chest results noted/ neg for PE / right pleural effusion with pleural thickening   IV abx   f/u BC  plan for thoracentesis today      3. Fracture of Left arm   med f.u     dvt ppx 72 year old female with htn, fracture of Left arm, GERD, HLD admitted   for new onset  Afib with RVR/ PNA/ sepsis work up     1. Afib with RVR  new onset   driven by lung pathlogy/pnaetting of infection/ overall volume status   vr now controlled with iv cardizem and s/p dig iv  continue with metropolol 25 mg BID PO   add cardiZem 30 mg po q6 and hopefully titrate off drip later today   continue with hep gtt for now   f.u echo   CTangio neg for PE / right pleural effusion with pleural thickening     2. PNA/empyema  per patient sob improving, currently on NC O2  s/p thoracentesis with purulent drainage  IV abx   f/u BC  f/u fluid studies   thoracic eval       3. Fracture of Left arm   med f.u     dvt ppx

## 2017-08-24 NOTE — PROGRESS NOTE ADULT - SUBJECTIVE AND OBJECTIVE BOX
CARDIOLOGY FOLLOW UP - Dr. Grant    CC no chest pain, palpitations, no SOB , currently on NC 02      PHYSICAL EXAM:  T(C): 36.8 (08-24-17 @ 04:41), Max: 37.6 (08-24-17 @ 00:41)  HR: 92 (08-24-17 @ 04:41) (92 - 144)  BP: 140/67 (08-24-17 @ 04:41) (116/74 - 159/64)  RR: 19 (08-24-17 @ 04:41) (19 - 22)  SpO2: 98% (08-24-17 @ 04:41) (90% - 100%)  Wt(kg): --  I&O's Summary    23 Aug 2017 07:01  -  24 Aug 2017 07:00  --------------------------------------------------------  IN: 2095 mL / OUT: 200 mL / NET: 1895 mL        Appearance: Normal	  Cardiovascular: Normal S1 S2,Irregular +murmur  Respiratory:  Diminished bl + Rales at base R > L  Gastrointestinal:  Soft, Non-tender, + BS	  Extremities: Normal range of motion, No clubbing, cyanosis or edema        MEDICATIONS  (STANDING):  levoFLOXacin IVPB   IV Intermittent   levoFLOXacin IVPB 500 milliGRAM(s) IV Intermittent every 24 hours  pantoprazole    Tablet 40 milliGRAM(s) Oral before breakfast  atorvastatin 40 milliGRAM(s) Oral at bedtime  aspirin  chewable 81 milliGRAM(s) Oral daily  zolpidem 5 milliGRAM(s) Oral at bedtime  polyethylene glycol 3350 17 Gram(s) Oral daily  sodium chloride 0.9%. 1000 milliLiter(s) (55 mL/Hr) IV Continuous <Continuous>  metoprolol 25 milliGRAM(s) Oral two times a day  diltiazem Infusion 5 mG/Hr (5 mL/Hr) IV Continuous <Continuous>  sodium chloride 0.9%. 1000 milliLiter(s) (100 mL/Hr) IV Continuous <Continuous>  heparin  Infusion.  Unit(s)/Hr (10 mL/Hr) IV Continuous <Continuous>      TELEMETRY: 	Afib HR 80- 120    ECG:  	  RADIOLOGY:   DIAGNOSTIC TESTING:  [ ] Echocardiogram:  [ ]  Catheterization:  [ ] Stress Test:    OTHER: 	  < from: CT Angio Chest w/ IV Cont (08.23.17 @ 15:24) >  IMPRESSION:    Noevidence of pulmonary embolism.    Large multiloculated right pleural effusion with pleural thickening,   concerning for possible infectious or neoplastic etiology. Compressive   partial atelectasis of the right lower and middle lobes.    Small left pleural effusion with associated compressive atelectasis of   the left lower lobe.    Patchy groundglass opacity in the left upper lobes which may be   infectious or related to pulmonary edema given the presence of pleural   effusion.    Partially visualized acute comminuted spiral fracture of the left humeral   mid to distal shaft.           ------------------------------------------------------------------------------------------        < from: US Chest (08.23.17 @ 16:31) >    IMPRESSION:     Complex right lower hemithorax collection.            < end of copied text >    LABS:	 	                                9.4    14.13 )-----------( 438      ( 24 Aug 2017 07:32 )             30.2     08-24    138  |  103  |  4<L>  ----------------------------<  110<H>  4.1   |  23  |  0.37<L>    Ca    8.4      24 Aug 2017 07:28  Phos  2.6     08-23  Mg     1.4     08-23    TPro  6.1  /  Alb  2.1<L>  /  TBili  0.3  /  DBili  x   /  AST  38  /  ALT  27  /  AlkPhos  95  08-23    PT/INR - ( 24 Aug 2017 07:26 )   PT: 17.6 sec;   INR: 1.54 ratio         PTT - ( 24 Aug 2017 07:26 )  PTT:28.9 sec

## 2017-08-24 NOTE — PROGRESS NOTE ADULT - PROBLEM SELECTOR PLAN 1
NPO after midnight  hibiclens scrub tonight and in am  abx on call to OR  Type and Cross  Please hold heparin gtt after midnight NPO after midnight for right VATS decortication at 12:30 Friday with Dr Placido mariscal scrub tonight and in am  abx on call to OR  Type and Cross  Please hold heparin gtt after midnight

## 2017-08-24 NOTE — CONSULT NOTE ADULT - ASSESSMENT
72y year old Female who presents with hypoxia and A fib with RVR before her scheduled L humerus ORIF, CT finding with right side pleural effusion, and complex fluid collection, with pleural thickening, possible infection etiology. patient has 50 years smoking history.  - Medical management per primary team  - Pulmonary on board  - Cardiology on board  - f/u with thoracentesis, and cytology  - repeat CT scan post thoracentesis  - no urgent surgical intervention at present time  - thoracic surgery will follow.  - d/w with Dr. Cummins.

## 2017-08-24 NOTE — CONSULT NOTE ADULT - SUBJECTIVE AND OBJECTIVE BOX
CC: Patient is a 72y old  Female who presents with a chief complaint of Afib.    HPI: 72 year old female with hx of HTN, HLD, gerd, insomnia s/p mechanical fall in 5/2017 with left humeral fracture sent from preop testing for Afib with RVR. Pt scheduled to undergo ORIF of left upper extremity tomorrow, now admitted for workup of Afib/rvr/sepsis. Denies chest pain, palpitations, shortness of breath, abdominal pain, fevers, chills, cough, dysuria, hematuria, melena, hematochezia. No headaches, dizziness, LOC. No sick contacts or travel history. Chronic smoking history over 50 pack years, quit in May 2017, never had CT scan. Reports decreased appetite and weight loss of 5-10lbs in the past month. Lives at home with family, ambulates without cane/ walker, ambulation has been limited since the fall/farcture.     Patient had shortness of breath, improved with nasal oxygen, CT angio negative for P/E, however, right pleural effusion noted, appear to be loculated. Patient was placed on Heparin gtt, and cardizem gtt, HR improving. Patient is scheduled to have thoracentesis on 8/24, heparin drip was turned off.     PMH  Displaced spiral fracture of shaft of humerus, left arm, subsequent encounter for fracture with routine healing  Insomnia  GERD (gastroesophageal reflux disease)  HLD (hyperlipidemia)  HTN (hypertension)    PSH: Non contributory    MEDS:   Antimicrobials:  levoFLOXacin IVPB   IV Intermittent   levoFLOXacin IVPB 500 milliGRAM(s) IV Intermittent every 24 hours      Cardiology:  digoxin  Injectable 0.5 milliGRAM(s) IV Push every 6 hours  metoprolol 25 milliGRAM(s) Oral two times a day  diltiazem Infusion 5 mG/Hr IV Continuous <Continuous>      Other:  heparin  Infusion.  Unit(s)/Hr IV Continuous <Continuous>  heparin  Injectable 4000 Unit(s) IV Push every 6 hours PRN  heparin  Injectable 2000 Unit(s) IV Push every 6 hours PRN  pantoprazole    Tablet 40 milliGRAM(s) Oral before breakfast  atorvastatin 40 milliGRAM(s) Oral at bedtime  aspirin  chewable 81 milliGRAM(s) Oral daily  zolpidem 5 milliGRAM(s) Oral at bedtime  acetaminophen   Tablet 650 milliGRAM(s) Oral every 6 hours PRN  acetaminophen   Tablet. 650 milliGRAM(s) Oral every 6 hours PRN  polyethylene glycol 3350 17 Gram(s) Oral daily  sodium chloride 0.9%. 1000 milliLiter(s) IV Continuous <Continuous>  magnesium sulfate  IVPB 2 Gram(s) IV Intermittent once      Allergies    No Known Allergies      Social: More than 50 years smoking history, quit May 2017.         Physical Exam  T(C): 36.8 (08-24-17 @ 04:41), Max: 37.6 (08-24-17 @ 00:41)  HR: 92 (08-24-17 @ 04:41) (92 - 144)  BP: 140/67 (08-24-17 @ 04:41) (116/74 - 159/64)  RR: 19 (08-24-17 @ 04:41) (19 - 22)  SpO2: 98% (08-24-17 @ 04:41) (90% - 100%)  Tmax: T(C): , Max: 37.6 (08-24-17 @ 00:41)    General: Awake. Alert. Cooperative. Anxious. Appears stated age 	  HEENT:   Atraumatic. Normocephalic. Anicteric. Normal oral mucosa, PERRL, EOMI  Neck: Supple. Trachea midline. Thyroid without enlargement/tenderness/nodules. No carotid bruit. No JVD	  Cardiovascular: Irregularly irregular rate and rhythm. prominent  S1 S2 prominent; II/VI systolic murmur  Respiratory: Tachypneic. Decreased breath sounds right base with dullness and rales  Abdomen: Soft. Non-tender. Non-distended. No organomegaly. No masses. Normal bowel sounds	  Extremities: Warm to touch. No clubbing or cyanosis. No pedal edema.  Pulses: 2+ peripheral pulses all extremities	  Skin: Normal skin color. No rashes or lesions. No ecchymoses, No cyanosis. Warm to touch  Lymph Nodes: Cervical, supraclavicular and axillary nodes normal  Neurological: Motor and sensory examination equal and normal. A and O x 3  Psychiatry: Appropriate mood and affect.    08-22-17  -  08-23-17  --------------------------------------------------------  IN:    heparin  Infusion.: 100 mL    Oral Fluid: 240 mL    sodium chloride 0.9%.: 495 mL  Total IN: 835 mL    OUT:    Voided: 250 mL  Total OUT: 250 mL    Total NET: 585 mL      08-23-17  -  08-24-17  --------------------------------------------------------  IN:    Oral Fluid: 800 mL  Total IN: 800 mL    OUT:    Voided: 100 mL  Total OUT: 100 mL    Total NET: 700 mL      Labs:                        9.2    13.57 )-----------( 419      ( 23 Aug 2017 08:39 )             30.2     08-23    140  |  106  |  11  ----------------------------<  112<H>  5.0   |  20<L>  |  0.46<L>    Ca    7.4<L>      23 Aug 2017 08:35  Phos  2.6     08-23  Mg     1.4     08-23    TPro  6.1  /  Alb  2.1<L>  /  TBili  0.3  /  DBili  x   /  AST  38  /  ALT  27  /  AlkPhos  95  08-23    PT/INR - ( 22 Aug 2017 13:02 )   PT: 17.7 sec;   INR: 1.61 ratio         PTT - ( 23 Aug 2017 17:27 )  PTT:72.2 sec  Urinalysis Basic - ( 22 Aug 2017 23:24 )    Color: x / Appearance: x / SG: x / pH: x  Gluc: x / Ketone: x  / Bili: x / Urobili: x   Blood: x / Protein: x / Nitrite: x   Leuk Esterase: x / RBC: 0-2 /HPF / WBC 11-25 /HPF   Sq Epi: x / Non Sq Epi: OCC /HPF / Bacteria: Few /HPF    CT IMPRESSION:    No evidence of pulmonary embolism.    Large multiloculated right pleural effusion with pleural thickening,  concerning for possible infectious or neoplastic etiology. Compressive  partial atelectasis of the right lower and middle lobes.    Small left pleural effusion with associated compressive atelectasis of the  left lower lobe.    Patchy groundglass opacity in the left upper lobes which may be infectious  or related to pulmonary edema given the presence of pleural effusion.    Partially visualized acute comminuted spiral fracture of the left humeral  mid to distal shaft.    US IMPRESSION:    Complex right lower hemithorax collection.

## 2017-08-24 NOTE — CHART NOTE - NSCHARTNOTEFT_GEN_A_CORE
[facility]    PROCEDURE: THORACENTESIS    INDICATION: PLEURAL EFFUSION                    [ x] DIAGNOSTIC                    [ ] THERAPEUTIC                            [ ] r/o CHF                                    [ x] r/o neoplasm                   [ x] r/o empyema                    [ ] r/o hemothorax      PRE-PROCEDURE  Informed consent was obtained after the risks and benefits of the procedure were explained. Risks described included but were not limited to bleeding, cough, pneumothorax (potentially requiring chest tube placement, vaso-vagal reactions, chest pain and death.    TIME OUT was performed prior to the procedure with verbal confirmation of correct patient identity, correct site, availability of necessary equipment, and accuracy of the consent form. Safety precautions based on the patient history and medication use have been addressed.    The patient was positioned in a sitting position at the edge of the bed, leaning forward with arms resting on a table. The posterior  [ ] left    [x ] right     chest was prepped and draped with strict adherence to aseptic precautions after localization of the pleural fluid was performed using ultrasound guidance. Xylocaine 1% was injected for local anesthesia and then a thoracentesis catheter was advanced into the pleural space.     Approximately    100 cc  of                [ ] clear yellow          [ ] straw colored          [ ] serosanguinous          [ ] blood-tinged          [ ] bloody          [x ] purulent          [ ] chylous          [ ] cloudy     fluid was removed.    The patient tolerated the procedure and no immediate complications were observed. Post-procedure vital signs were stable.    [ x] Specimens were sent for cytology, AFB smear and culure, fungal culture, routine culture, gram stain, cell count, pH and chemistry.    [ ] Specimens were not sent.    [ x] A post-procedure CXR to r/o PTX has been ordered and will be checked.        Truman Arellano MD, [facility]

## 2017-08-24 NOTE — PROGRESS NOTE ADULT - SUBJECTIVE AND OBJECTIVE BOX
[facility] - [facility]     PROGRESS NOTE    CHIEF COMPLAINT: abnormal CXR    INTERVAL HISTORY: AF rate controlled on cardizem gtt; off heparin gtt in anticipation or thoracentesis this AM; hemodynamically stable; no fevers, chills or sweats; no cough, sputum production, chest congestion or wheeze; no chest pain/pressure of palpitations; orthopedic to fix humeral fracture on hold    REVIEW OF SYSTEMS:  Constitutional: As per interval history  HEENT: Within normal limits  CV: As per interval history  Resp: As per interval history  GI: Within normal limits   : Within normal limits  Musculoskeletal: As per interval history  Skin: Within normal limits  Neurological: Within normal limits  Psychiatric: Within normal limits  Endocrine: Within normal limits  Hematologic/Lymphatic: Within normal limits  Allergic/Immunologic: Within normal limits        MEDICATIONS:     Pulmonary "      Anti-microbials:  levoFLOXacin IVPB   IV Intermittent   levoFLOXacin IVPB 500 milliGRAM(s) IV Intermittent every 24 hours      Cardiovascular:  metoprolol 25 milliGRAM(s) Oral two times a day  diltiazem Infusion 5 mG/Hr IV Continuous <Continuous>      Other:  heparin  Injectable 4000 Unit(s) IV Push every 6 hours PRN  heparin  Injectable 2000 Unit(s) IV Push every 6 hours PRN  pantoprazole    Tablet 40 milliGRAM(s) Oral before breakfast  atorvastatin 40 milliGRAM(s) Oral at bedtime  aspirin  chewable 81 milliGRAM(s) Oral daily  zolpidem 5 milliGRAM(s) Oral at bedtime  acetaminophen   Tablet 650 milliGRAM(s) Oral every 6 hours PRN  acetaminophen   Tablet. 650 milliGRAM(s) Oral every 6 hours PRN  polyethylene glycol 3350 17 Gram(s) Oral daily  sodium chloride 0.9%. 1000 milliLiter(s) IV Continuous <Continuous>  sodium chloride 0.9%. 1000 milliLiter(s) IV Continuous <Continuous>  heparin  Infusion.  Unit(s)/Hr IV Continuous <Continuous>        OBJECTIVE:        I&O's Detail    23 Aug 2017 07:01  -  24 Aug 2017 07:00  --------------------------------------------------------  IN:    diltiazem Infusion: 55 mL    Oral Fluid: 1040 mL    sodium chloride 0.9%.: 1000 mL  Total IN: 2095 mL    OUT:    Voided: 200 mL  Total OUT: 200 mL    Total NET: 1895 mL    PHYSICAL EXAM:       ICU Vital Signs Last 24 Hrs  T(C): 36.8 (24 Aug 2017 04:41), Max: 37.6 (24 Aug 2017 00:41)  T(F): 98.3 (24 Aug 2017 04:41), Max: 99.6 (24 Aug 2017 00:41)  HR: 92 (24 Aug 2017 04:41) (92 - 144)  BP: 140/67 (24 Aug 2017 04:41) (116/74 - 159/64)  BP(mean): --  ABP: --  ABP(mean): --  RR: 19 (24 Aug 2017 04:41) (19 - 22)  SpO2: 98% (24 Aug 2017 04:41) (90% - 100%) on 50% VM - 4lpm nasal canula     General: Awake. Alert. Cooperative. No distress. Appears stated age 	  HEENT:   AT/NC/Anicteric. PERRL/EOMI. Normal oral mucosa,  Neck: Supple. Trachea midline. Thyroid without enlargement/tenderness/nodules. No carotid bruit. No JVD	  Cardiovascular: Irregularly irregular rate and rhythm. S1 S2 normal. II/VI systolic murmru  Respiratory: Respirations unlabored. Clear to A&P  Abdomen: Soft. Non-tender. Non-distended. No organomegaly. No masses. Normal BS  Extremities: Warm to touch. LUE in hard brace  Pulses: 2+ peripheral pulses all extremities	  Skin: Normal skin color. No rashes or lesions. No ecchymoses. No cyanosis.  Warm to touch  Lymph Nodes: Cervical, supraclavicular and axillary nodes normal  Neurological: Motor and sensory examination equal and normal. A and O x 3  Psychiatry: Appropriate mood and affect.      LABS:                        9.4    14.13 )-----------( 438      ( 24 Aug 2017 07:32 )             30.2                         9.2    13.57 )-----------( 419      ( 23 Aug 2017 08:39 )             30.2     08-24    138  |  103  |  4<L>  ----------------------------<  110<H>  4.1   |  23  |  0.37<L>    08-23    140  |  106  |  11  ----------------------------<  112<H>  5.0   |  20<L>  |  0.46<L>    Ca      8.4      08-24    Ca      7.4<L>      08-23    Phos    2.6     08-23    Phos    2.4     08-22      Mg       1.4     08-23    Mg       1.6     08-22    TPro  6.1  /  Alb  2.1<L>  /  TBili  0.3  /  DBili  x   /  AST  38  /  ALT  27  /  AlkPhos  95  08-23    TPro  7.7  /  Alb  3.2<L>  /  TBili  0.5  /  DBili  x   /  AST  22  /  ALT  27  /  AlkPhos  105  08-22    PT/INR - ( 24 Aug 2017 07:26 )   PT: 17.6 sec;   INR: 1.54 ratio         PTT - ( 24 Aug 2017 07:26 )  PTT:28.9 sec    CARDIAC MARKERS ( 23 Aug 2017 07:15 )  x     / <0.01 ng/mL / 21 U/L / x     / 1.0 ng/mL  CARDIAC MARKERS ( 22 Aug 2017 23:35 )  x     / x     / 18 U/L / x     / x      CARDIAC MARKERS ( 22 Aug 2017 23:29 )  x     / <0.01 ng/mL / x     / x     / 1.0 ng/mL  CARDIAC MARKERS ( 22 Aug 2017 13:02 )  x     / <0.01 ng/mL / x     / x     / x          MICROBIOLOGY:   Urinalysis Basic - ( 22 Aug 2017 23:24 )    Color: x / Appearance: x / SG: x / pH: x  Gluc: x / Ketone: x  / Bili: x / Urobili: x   Blood: x / Protein: x / Nitrite: x   Leuk Esterase: x / RBC: 0-2 /HPF / WBC 11-25 /HPF   Sq Epi: x / Non Sq Epi: OCC /HPF / Bacteria: Few /HPF    Culture - Urine (08.23.17 @ 03:20)    Specimen Source: .Urine Clean Catch (Midstream)    Culture Results:   No growth    Culture - Blood (08.22.17 @ 23:03)    Specimen Source: .Blood Blood-Peripheral    Culture Results:   No growth to date.    Culture - Blood (08.22.17 @ 23:03)    Specimen Source: .Blood Blood-Peripheral    Culture Results:   No growth to date.      RADIOLOGY:  [x ] Chest radiographs reviewed and interpreted by me    < from: CT Angio Chest w/ IV Cont (08.23.17 @ 15:24) >    EXAM:  CT ANGIO CHEST (W)AW IC                            PROCEDURE DATE:  08/23/2017        INTERPRETATION:  CLINICAL INFORMATION: Hypoxia and rapid atrial   fibrillation. Pneumonia on x-ray dated 8/22/2017.    PROCEDURE:   CT Pulmonary Angiogram was performed with intravenous contrast.     Intravenous contrast: 90 ml Omnipaque 350. 10 ml discarded.    Reconstructions were performed in axial thin, axial maximum intensity   projection, sagittal and coronal planes.    COMPARISON: Chest x-raydated 8/23/2017 and 8/22/2017. MRI abdomen   4/22/2009.    FINDINGS:    LUNGS AND LARGE AIRWAYS: Patent central airways. Compressive atelectasis   of the bilateral lower lobes, greater on the right, and the right middle   lobe. Foci of peripherally oriented groundglass opacities in the left   upper lobe. Small amount of paraseptal emphysema is seen in the left   upper lobe.  PLEURA: Large multiloculated right pleural effusion with thickened and   mildly hyperdense peripheral wall, indicative of pleural thickening.   Small layering left pleural effusion.  VESSELS: No evidence of pulmonary embolism. Atherosclerotic   calcifications of the thoracic aorta and coronary arteries.  HEART: There is mild dilatation of the left atrium. No pericardial   effusion.  MEDIASTINUM AND JEAN PIERRE: No lymphadenopathy.  CHEST WALL AND LOWER NECK: Within normal limits.  UPPER ABDOMEN: Partially visualized peripherally calcified right renal   cyst, as seen on prior MRI abdomen dated 4/22/2009, with increased   calcifications from prior study. Unchanged hepatic calcification. Status   post cholecystectomy.  BONES: Degenerative spondylosis of the spine. Partially visualized   comminuted spiral fracture of the left humeral mid to distal shaft.    IMPRESSION:    No evidence of pulmonary embolism.    Large multiloculated right pleural effusion with pleural thickening,   concerning for possible infectious or neoplastic etiology. Compressive   partial atelectasis of the right lower and middle lobes.    Small left pleural effusion with associated compressive atelectasis of   the left lower lobe.    Patchy groundglass opacity in the left upper lobes which may be   infectious or related to pulmonary edema given the presence of pleural   effusion.    Partially visualized acute comminuted spiral fracture of the left humeral   mid to distal shaft.     SAVAGE CID M.D., RADIOLOGY RESIDENT  This document has been electronically signed.  JEANETH ROBERTS M.D. ATTENDING RADIOLOGIST  This document has beenelectronically signed. Aug 23 2017  4:29PM          < end of copied text >  ---------------------------------------------------------------------------------------------------------  < from: Xray Chest 1 View AP/PA (08.22.17 @ 13:07) >    EXAM:  CHEST SINGLE AP OR PA                            PROCEDURE DATE:  08/22/2017        INTERPRETATION:    CLINICAL INDICATION: A. fib    TECHNIQUE: Portable frontal view of the chest.    COMPARISON: Frontal chest radiograph from 4/16/2009.    FINDINGS:     There are calcifications of the aortic arch.  There is opacification of the right lower lung field with silhouetting of   the right hemithorax, consistent with right lower lobar pneumonia. Right   pleural effusion.  No pneumothorax.  No acute osseous abnormality.      IMPRESSION:   Right lower lobar pneumonia. Right pleural effusion.      JOSHUA RICHTER M.D., RADIOLOGY RESIDENT  This document has been electronically signed.  HOLLEY KEBEDE M.D., ATTENDING RADIOLOGIST  This document has been electronically signed. Aug 22 2017  2:28PM      < end of copied text >  ---------------------------------------------------------------------------------------------------------

## 2017-08-24 NOTE — PROGRESS NOTE ADULT - SUBJECTIVE AND OBJECTIVE BOX
Patient is a 72y old  Female who presents with a chief complaint of Afib (22 Aug 2017 20:06)      SUBJECTIVE / OVERNIGHT EVENTS:  S/p Thoracentesis.   Denies CP/SOB/Palpitation/HA.    MEDICATIONS  (STANDING):  levoFLOXacin IVPB   IV Intermittent   levoFLOXacin IVPB 500 milliGRAM(s) IV Intermittent every 24 hours  pantoprazole    Tablet 40 milliGRAM(s) Oral before breakfast  atorvastatin 40 milliGRAM(s) Oral at bedtime  aspirin  chewable 81 milliGRAM(s) Oral daily  zolpidem 5 milliGRAM(s) Oral at bedtime  polyethylene glycol 3350 17 Gram(s) Oral daily  sodium chloride 0.9%. 1000 milliLiter(s) (55 mL/Hr) IV Continuous <Continuous>  metoprolol 25 milliGRAM(s) Oral two times a day  diltiazem Infusion 5 mG/Hr (5 mL/Hr) IV Continuous <Continuous>  sodium chloride 0.9%. 1000 milliLiter(s) (100 mL/Hr) IV Continuous <Continuous>  diltiazem    Tablet 30 milliGRAM(s) Oral four times a day  chlorhexidine 4% Liquid 1 Application(s) Topical two times a day  heparin  Infusion. 1100 Unit(s)/Hr (11 mL/Hr) IV Continuous <Continuous>    MEDICATIONS  (PRN):  acetaminophen   Tablet 650 milliGRAM(s) Oral every 6 hours PRN For Temp greater than 38 C (100.4 F)  acetaminophen   Tablet. 650 milliGRAM(s) Oral every 6 hours PRN Mild Pain (1 - 3)  heparin  Injectable 4000 Unit(s) IV Push every 6 hours PRN For aPTT less than 40  heparin  Injectable 2000 Unit(s) IV Push every 6 hours PRN For aPTT between 40 - 57        CAPILLARY BLOOD GLUCOSE        I&O's Summary    23 Aug 2017 07:01  -  24 Aug 2017 07:00  --------------------------------------------------------  IN: 2095 mL / OUT: 200 mL / NET: 1895 mL    24 Aug 2017 07:01  -  24 Aug 2017 22:43  --------------------------------------------------------  IN: 770 mL / OUT: 800 mL / NET: -30 mL        PHYSICAL EXAM:  GENERAL: NAD, well-developed  HEAD:  Atraumatic, Normocephalic  EYES: conjunctiva and sclera clear  NECK: Supple, No JVD  CHEST/LUNG: Clear to auscultation bilaterally; No wheeze  HEART: Regular rate and rhythm; No murmurs, rubs, or gallops  ABDOMEN: Soft, Nontender, Nondistended; Bowel sounds present  EXTREMITIES:  2+ Peripheral Pulses, No clubbing, cyanosis, or edema  NEUROLOGY: AAO X 3  SKIN: No rashes    LABS:                        9.4    14.13 )-----------( 438      ( 24 Aug 2017 07:32 )             30.2     08-24    138  |  103  |  4<L>  ----------------------------<  110<H>  4.1   |  23  |  0.37<L>    Ca    8.4      24 Aug 2017 07:28  Phos  2.6     08-23  Mg     1.4     08-23    TPro  6.1  /  Alb  2.1<L>  /  TBili  0.3  /  DBili  x   /  AST  38  /  ALT  27  /  AlkPhos  95  08-23    PT/INR - ( 24 Aug 2017 07:26 )   PT: 17.6 sec;   INR: 1.54 ratio         PTT - ( 24 Aug 2017 07:26 )  PTT:28.9 sec  CARDIAC MARKERS ( 23 Aug 2017 07:15 )  x     / <0.01 ng/mL / 21 U/L / x     / 1.0 ng/mL  CARDIAC MARKERS ( 22 Aug 2017 23:35 )  x     / x     / 18 U/L / x     / x      CARDIAC MARKERS ( 22 Aug 2017 23:29 )  x     / <0.01 ng/mL / x     / x     / 1.0 ng/mL      Urinalysis Basic - ( 22 Aug 2017 23:24 )    Color: x / Appearance: x / SG: x / pH: x  Gluc: x / Ketone: x  / Bili: x / Urobili: x   Blood: x / Protein: x / Nitrite: x   Leuk Esterase: x / RBC: 0-2 /HPF / WBC 11-25 /HPF   Sq Epi: x / Non Sq Epi: OCC /HPF / Bacteria: Few /HPF      CAPILLARY BLOOD GLUCOSE                    RADIOLOGY & ADDITIONAL TESTS:    Imaging Personally Reviewed:    Consultant(s) Notes Reviewed:      Care Discussed with Consultants/Other Providers:

## 2017-08-24 NOTE — PROGRESS NOTE ADULT - SUBJECTIVE AND OBJECTIVE BOX
VITAL SIGNS    T(C): 36.8   HR: 82 afib  BP: 135/75 (08-24-17 @ 11:50) (135/75 - 159/64)  RR: 18   SpO2: 98%           PHYSICAL EXAM    Subjective:  Neurology: alert and oriented x 3, nonfocal, no gross deficits  CV :  Sternal Wound :  CDI , Stable  Lungs:  Abdomen: soft, NT,ND, ( )BM  :  voiding  Extremities:         LABS  08-24    138  |  103  |  4<L>  ----------------------------<  110<H>  4.1   |  23  |  0.37<L>    Ca    8.4      24 Aug 2017 07:28  Phos  2.6     08-23  Mg     1.4     08-23    TPro  6.1  /  Alb  2.1<L>  /  TBili  0.3  /  DBili  x   /  AST  38  /  ALT  27  /  AlkPhos  95  08-23                                 9.4    14.13 )-----------( 438      ( 24 Aug 2017 07:32 )             30.2          PT/INR - ( 24 Aug 2017 07:26 )   PT: 17.6 sec;   INR: 1.54 ratio         PTT - ( 24 Aug 2017 07:26 )  PTT:28.9 sec       PAST MEDICAL & SURGICAL HISTORY:  Displaced spiral fracture of shaft of humerus, left arm, subsequent encounter for fracture with routine healing  Insomnia  GERD (gastroesophageal reflux disease)  HLD (hyperlipidemia)  HTN (hypertension) VITAL SIGNS    T(C): 36.8   HR: 82 afib  BP: 135/75 (08-24-17 @ 11:50) (135/75 - 159/64)  RR: 18   SpO2: 98%           PHYSICAL EXAM    Subjective: NAD  Neurology: alert and oriented x 3, nonfocal, no gross deficits  CV :S1S2  Lungs:CTA b/l  Abdomen: soft, NT,ND, ( )BM  :  voiding  Extremities: LUE shoulder sling        LABS  08-24    138  |  103  |  4<L>  ----------------------------<  110<H>  4.1   |  23  |  0.37<L>    Ca    8.4      24 Aug 2017 07:28  Phos  2.6     08-23  Mg     1.4     08-23    TPro  6.1  /  Alb  2.1<L>  /  TBili  0.3  /  DBili  x   /  AST  38  /  ALT  27  /  AlkPhos  95  08-23                                 9.4    14.13 )-----------( 438      ( 24 Aug 2017 07:32 )             30.2          PT/INR - ( 24 Aug 2017 07:26 )   PT: 17.6 sec;   INR: 1.54 ratio         PTT - ( 24 Aug 2017 07:26 )  PTT:28.9 sec

## 2017-08-24 NOTE — PROGRESS NOTE ADULT - ASSESSMENT
ASSESSMENT    large multiloculated right pleural effusion with thickened and mildly hyperdense peripheral c/w pleural thickening - differential mainly includes a missed pneumonia now with an empyema versus a malignant pleural efflusion    atrial fibrillation with RVR now with rate control on cardizem gtt and oral beta blockers    PLAN/RECOMMENDATIONS    oxygen supplementation to keep saturation greater than 92%  restart heparin gtt after throacentesis  AF rate control    d/c IV fluids  on levaquin for now  regardless of the underlying pathology, suspect that patient will need formal thoracic surgery  I have asked Dr. Cummins to assist with thoracic surgical management      Will follow with you    Truman Arellano MD, [facility] - [phone]  Pulmonary Medicine

## 2017-08-25 LAB
ALBUMIN SERPL ELPH-MCNC: 2.2 G/DL — LOW (ref 3.3–5)
ALP SERPL-CCNC: 70 U/L — SIGNIFICANT CHANGE UP (ref 40–120)
ALT FLD-CCNC: 23 U/L RC — SIGNIFICANT CHANGE UP (ref 10–45)
ANION GAP SERPL CALC-SCNC: 14 MMOL/L — SIGNIFICANT CHANGE UP (ref 5–17)
ANION GAP SERPL CALC-SCNC: 15 MMOL/L — SIGNIFICANT CHANGE UP (ref 5–17)
APTT BLD: 29.3 SEC — SIGNIFICANT CHANGE UP (ref 27.5–37.4)
AST SERPL-CCNC: 24 U/L — SIGNIFICANT CHANGE UP (ref 10–40)
BASOPHILS # BLD AUTO: 0 K/UL — SIGNIFICANT CHANGE UP (ref 0–0.2)
BASOPHILS NFR BLD AUTO: 0 % — SIGNIFICANT CHANGE UP (ref 0–2)
BILIRUB SERPL-MCNC: 1.8 MG/DL — HIGH (ref 0.2–1.2)
BLD GP AB SCN SERPL QL: NEGATIVE — SIGNIFICANT CHANGE UP
BUN SERPL-MCNC: 5 MG/DL — LOW (ref 7–23)
BUN SERPL-MCNC: 5 MG/DL — LOW (ref 7–23)
CALCIUM SERPL-MCNC: 7.2 MG/DL — LOW (ref 8.4–10.5)
CALCIUM SERPL-MCNC: 8.5 MG/DL — SIGNIFICANT CHANGE UP (ref 8.4–10.5)
CHLORIDE SERPL-SCNC: 102 MMOL/L — SIGNIFICANT CHANGE UP (ref 96–108)
CHLORIDE SERPL-SCNC: 105 MMOL/L — SIGNIFICANT CHANGE UP (ref 96–108)
CO2 SERPL-SCNC: 22 MMOL/L — SIGNIFICANT CHANGE UP (ref 22–31)
CO2 SERPL-SCNC: 24 MMOL/L — SIGNIFICANT CHANGE UP (ref 22–31)
CREAT SERPL-MCNC: 0.31 MG/DL — LOW (ref 0.5–1.3)
CREAT SERPL-MCNC: 0.32 MG/DL — LOW (ref 0.5–1.3)
EOSINOPHIL # BLD AUTO: SIGNIFICANT CHANGE UP (ref 0–0.5)
EOSINOPHIL NFR BLD AUTO: 0 % — SIGNIFICANT CHANGE UP (ref 0–6)
GAS PNL BLDA: SIGNIFICANT CHANGE UP
GLUCOSE SERPL-MCNC: 101 MG/DL — HIGH (ref 70–99)
GLUCOSE SERPL-MCNC: 151 MG/DL — HIGH (ref 70–99)
HCT VFR BLD CALC: 33.8 % — LOW (ref 34.5–45)
HGB BLD-MCNC: 11.3 G/DL — LOW (ref 11.5–15.5)
INR BLD: 1.49 RATIO — HIGH (ref 0.88–1.16)
INR BLD: 1.63 RATIO — HIGH (ref 0.88–1.16)
LYMPHOCYTES # BLD AUTO: 0.7 K/UL — LOW (ref 1–3.3)
LYMPHOCYTES # BLD AUTO: 3 % — LOW (ref 13–44)
MCHC RBC-ENTMCNC: 30.7 PG — SIGNIFICANT CHANGE UP (ref 27–34)
MCHC RBC-ENTMCNC: 33.4 GM/DL — SIGNIFICANT CHANGE UP (ref 32–36)
MCV RBC AUTO: 92 FL — SIGNIFICANT CHANGE UP (ref 80–100)
MONOCYTES # BLD AUTO: 1.4 K/UL — HIGH (ref 0–0.9)
MONOCYTES NFR BLD AUTO: 5 % — SIGNIFICANT CHANGE UP (ref 2–14)
NEUTROPHILS # BLD AUTO: 22.8 K/UL — HIGH (ref 1.8–7.4)
NEUTROPHILS NFR BLD AUTO: 88 % — HIGH (ref 43–77)
NIGHT BLUE STAIN TISS: SIGNIFICANT CHANGE UP
NON-GYNECOLOGICAL CYTOLOGY STUDY: SIGNIFICANT CHANGE UP
PLATELET # BLD AUTO: 336 K/UL — SIGNIFICANT CHANGE UP (ref 150–400)
POTASSIUM SERPL-MCNC: 3.5 MMOL/L — SIGNIFICANT CHANGE UP (ref 3.5–5.3)
POTASSIUM SERPL-MCNC: 3.8 MMOL/L — SIGNIFICANT CHANGE UP (ref 3.5–5.3)
POTASSIUM SERPL-SCNC: 3.5 MMOL/L — SIGNIFICANT CHANGE UP (ref 3.5–5.3)
POTASSIUM SERPL-SCNC: 3.8 MMOL/L — SIGNIFICANT CHANGE UP (ref 3.5–5.3)
PROT SERPL-MCNC: 4.9 G/DL — LOW (ref 6–8.3)
PROTHROM AB SERPL-ACNC: 16.2 SEC — HIGH (ref 9.8–12.7)
PROTHROM AB SERPL-ACNC: 17.8 SEC — HIGH (ref 9.8–12.7)
RBC # BLD: 3.67 M/UL — LOW (ref 3.8–5.2)
RBC # FLD: 12.5 % — SIGNIFICANT CHANGE UP (ref 10.3–14.5)
RH IG SCN BLD-IMP: POSITIVE — SIGNIFICANT CHANGE UP
SODIUM SERPL-SCNC: 141 MMOL/L — SIGNIFICANT CHANGE UP (ref 135–145)
SODIUM SERPL-SCNC: 141 MMOL/L — SIGNIFICANT CHANGE UP (ref 135–145)
SPECIMEN SOURCE: SIGNIFICANT CHANGE UP
WBC # BLD: 24.9 K/UL — HIGH (ref 3.8–10.5)
WBC # FLD AUTO: 24.9 K/UL — HIGH (ref 3.8–10.5)

## 2017-08-25 PROCEDURE — 88112 CYTOPATH CELL ENHANCE TECH: CPT | Mod: 26

## 2017-08-25 PROCEDURE — 71010: CPT | Mod: 26

## 2017-08-25 PROCEDURE — 88331 PATH CONSLTJ SURG 1 BLK 1SPC: CPT | Mod: 26

## 2017-08-25 PROCEDURE — 32652 THORACOSCOPY REM TOTL CORTEX: CPT | Mod: 80

## 2017-08-25 PROCEDURE — 88305 TISSUE EXAM BY PATHOLOGIST: CPT | Mod: 26

## 2017-08-25 RX ORDER — FAMOTIDINE 10 MG/ML
20 INJECTION INTRAVENOUS
Qty: 0 | Refills: 0 | Status: DISCONTINUED | OUTPATIENT
Start: 2017-08-25 | End: 2017-08-28

## 2017-08-25 RX ORDER — MEROPENEM 1 G/30ML
1000 INJECTION INTRAVENOUS EVERY 8 HOURS
Qty: 0 | Refills: 0 | Status: DISCONTINUED | OUTPATIENT
Start: 2017-08-26 | End: 2017-09-05

## 2017-08-25 RX ORDER — VANCOMYCIN HCL 1 G
1000 VIAL (EA) INTRAVENOUS EVERY 12 HOURS
Qty: 0 | Refills: 0 | Status: DISCONTINUED | OUTPATIENT
Start: 2017-08-25 | End: 2017-08-26

## 2017-08-25 RX ORDER — MEROPENEM 1 G/30ML
1000 INJECTION INTRAVENOUS ONCE
Qty: 0 | Refills: 0 | Status: COMPLETED | OUTPATIENT
Start: 2017-08-25 | End: 2017-08-26

## 2017-08-25 RX ORDER — MEROPENEM 1 G/30ML
INJECTION INTRAVENOUS
Qty: 0 | Refills: 0 | Status: DISCONTINUED | OUTPATIENT
Start: 2017-08-26 | End: 2017-09-05

## 2017-08-25 RX ORDER — POTASSIUM CHLORIDE 20 MEQ
10 PACKET (EA) ORAL
Qty: 0 | Refills: 0 | Status: COMPLETED | OUTPATIENT
Start: 2017-08-25 | End: 2017-08-26

## 2017-08-25 RX ORDER — MAGNESIUM SULFATE 500 MG/ML
2 VIAL (ML) INJECTION ONCE
Qty: 0 | Refills: 0 | Status: COMPLETED | OUTPATIENT
Start: 2017-08-25 | End: 2017-08-25

## 2017-08-25 RX ORDER — ENOXAPARIN SODIUM 100 MG/ML
40 INJECTION SUBCUTANEOUS DAILY
Qty: 0 | Refills: 0 | Status: DISCONTINUED | OUTPATIENT
Start: 2017-08-25 | End: 2017-08-27

## 2017-08-25 RX ORDER — DOCUSATE SODIUM 100 MG
100 CAPSULE ORAL THREE TIMES A DAY
Qty: 0 | Refills: 0 | Status: DISCONTINUED | OUTPATIENT
Start: 2017-08-25 | End: 2017-08-28

## 2017-08-25 RX ADMIN — CHLORHEXIDINE GLUCONATE 1 APPLICATION(S): 213 SOLUTION TOPICAL at 06:06

## 2017-08-25 RX ADMIN — Medication 5 MG/HR: at 00:12

## 2017-08-25 RX ADMIN — SODIUM CHLORIDE 100 MILLILITER(S): 9 INJECTION INTRAMUSCULAR; INTRAVENOUS; SUBCUTANEOUS at 05:18

## 2017-08-25 RX ADMIN — Medication 25 MILLIGRAM(S): at 05:17

## 2017-08-25 NOTE — PROGRESS NOTE ADULT - PROBLEM SELECTOR PLAN 7
-Hold home dose of lisinopril and nifedipine in the setting of sepsis  -Pt may require cardizem for better bp and hr control   -Resume when bp allows
-P 2.4, replete, check phos in am
-P 2.4, replete, check phos in am

## 2017-08-25 NOTE — PROGRESS NOTE ADULT - SUBJECTIVE AND OBJECTIVE BOX
Patient is a 72y old  Female who presents with a chief complaint of Afib (22 Aug 2017 20:06)      SUBJECTIVE / OVERNIGHT EVENTS:   No CP/SOB/Palpitation/HA.    MEDICATIONS  (STANDING):  atorvastatin 40 milliGRAM(s) Oral at bedtime  aspirin  chewable 81 milliGRAM(s) Oral daily  sodium chloride 0.9%. 1000 milliLiter(s) (55 mL/Hr) IV Continuous <Continuous>  diltiazem Infusion 5 mG/Hr (5 mL/Hr) IV Continuous <Continuous>  sodium chloride 0.9%. 1000 milliLiter(s) (100 mL/Hr) IV Continuous <Continuous>  chlorhexidine 4% Liquid 1 Application(s) Topical two times a day  meropenem IVPB 1000 milliGRAM(s) IV Intermittent once  docusate sodium 100 milliGRAM(s) Oral three times a day  vancomycin  IVPB 1000 milliGRAM(s) IV Intermittent every 12 hours  meropenem IVPB   IV Intermittent   enoxaparin Injectable 40 milliGRAM(s) SubCutaneous daily  famotidine Injectable 20 milliGRAM(s) IV Push two times a day  potassium chloride  10 mEq/50 mL IVPB 10 milliEquivalent(s) IV Intermittent every 1 hour  magnesium sulfate  IVPB 2 Gram(s) IV Intermittent once    MEDICATIONS  (PRN):  acetaminophen   Tablet 650 milliGRAM(s) Oral every 6 hours PRN For Temp greater than 38 C (100.4 F)  acetaminophen   Tablet. 650 milliGRAM(s) Oral every 6 hours PRN Mild Pain (1 - 3)        CAPILLARY BLOOD GLUCOSE        I&O's Summary    24 Aug 2017 07:01  -  25 Aug 2017 07:00  --------------------------------------------------------  IN: 2145 mL / OUT: 1510 mL / NET: 635 mL    25 Aug 2017 07:01  -  25 Aug 2017 23:47  --------------------------------------------------------  IN: 0 mL / OUT: 750 mL / NET: -750 mL        PHYSICAL EXAM:  GENERAL: NAD, well-developed  HEAD:  Atraumatic, Normocephalic  EYES: conjunctiva and sclera clear  NECK: Supple, No JVD  CHEST/LUNG: Clear to auscultation bilaterally; No wheeze  HEART: Regular rate and rhythm; No murmurs, rubs, or gallops  ABDOMEN: Soft, Nontender, Nondistended; Bowel sounds present  EXTREMITIES:  2+ Peripheral Pulses, No clubbing, cyanosis, or edema  NEUROLOGY: AAO X 3  SKIN: No rashes    LABS:                        11.3   24.9  )-----------( 336      ( 25 Aug 2017 23:10 )             33.8     08-25    141  |  102  |  5<L>  ----------------------------<  151<H>  3.5   |  24  |  0.31<L>    Ca    7.2<L>      25 Aug 2017 23:10    TPro  4.9<L>  /  Alb  2.2<L>  /  TBili  1.8<H>  /  DBili  x   /  AST  24  /  ALT  23  /  AlkPhos  70  08-25    PT/INR - ( 25 Aug 2017 23:10 )   PT: 16.2 sec;   INR: 1.49 ratio         PTT - ( 25 Aug 2017 23:10 )  PTT:29.3 sec        CAPILLARY BLOOD GLUCOSE                    RADIOLOGY & ADDITIONAL TESTS:    Imaging Personally Reviewed:    Consultant(s) Notes Reviewed:      Care Discussed with Consultants/Other Providers:

## 2017-08-25 NOTE — PROGRESS NOTE ADULT - SUBJECTIVE AND OBJECTIVE BOX
[facility] - [facility]     PROGRESS NOTE    CHIEF COMPLAINT: empyema    INTERVAL HISTORY: sridevi pus drained from right pleural space; fluid too thick to be run for labs; cultures pending; awaiting VATS today; AF rate controlled on cardizem gtt; off heparin gtt in anticipation of surgery; hemodynamically stable; no fevers, chills or sweats; no cough, sputum production, chest congestion or wheeze; no chest pain/pressure of palpitations; orthopedic surgery on hold; severe discomfort from right arm hard brace    REVIEW OF SYSTEMS:  Constitutional: As per interval history  HEENT: Within normal limits  CV: As per interval history  Resp: As per interval history  GI: Within normal limits   : Within normal limits  Musculoskeletal: As per interval history  Skin: Within normal limits  Neurological: Within normal limits  Psychiatric: Within normal limits  Endocrine: Within normal limits  Hematologic/Lymphatic: Within normal limits  Allergic/Immunologic: Within normal limits      MEDICATIONS:     Pulmonary "      Anti-microbials:  levoFLOXacin IVPB   IV Intermittent   levoFLOXacin IVPB 500 milliGRAM(s) IV Intermittent every 24 hours      Cardiovascular:  metoprolol 25 milliGRAM(s) Oral two times a day  diltiazem Infusion 5 mG/Hr IV Continuous <Continuous>  diltiazem    Tablet 30 milliGRAM(s) Oral four times a day      Other:  pantoprazole    Tablet 40 milliGRAM(s) Oral before breakfast  atorvastatin 40 milliGRAM(s) Oral at bedtime  aspirin  chewable 81 milliGRAM(s) Oral daily  zolpidem 5 milliGRAM(s) Oral at bedtime  acetaminophen   Tablet 650 milliGRAM(s) Oral every 6 hours PRN  acetaminophen   Tablet. 650 milliGRAM(s) Oral every 6 hours PRN  polyethylene glycol 3350 17 Gram(s) Oral daily  sodium chloride 0.9%. 1000 milliLiter(s) IV Continuous <Continuous>  sodium chloride 0.9%. 1000 milliLiter(s) IV Continuous <Continuous>  chlorhexidine 4% Liquid 1 Application(s) Topical two times a day        OBJECTIVE:        I&O's Detail    24 Aug 2017 07:01  -  25 Aug 2017 07:00  --------------------------------------------------------  IN:    diltiazem Infusion: 60 mL    heparin  Infusion.: 55 mL    Oral Fluid: 830 mL    sodium chloride 0.9%.: 1200 mL  Total IN: 2145 mL    OUT:    Voided: 1510 mL  Total OUT: 1510 mL    Total NET: 635 mL      25 Aug 2017 07:01  -  25 Aug 2017 10:08  --------------------------------------------------------  IN:  Total IN: 0 mL    OUT:    Voided: 450 mL  Total OUT: 450 mL    Total NET: -450 mL      PHYSICAL EXAM:       ICU Vital Signs Last 24 Hrs  T(C): 36.6 (25 Aug 2017 04:38), Max: 36.9 (24 Aug 2017 20:07)  T(F): 97.9 (25 Aug 2017 04:38), Max: 98.4 (24 Aug 2017 20:07)  HR: 96 (25 Aug 2017 04:38) (77 - 96)  BP: 159/64 (25 Aug 2017 04:38) (121/52 - 159/64)  BP(mean): --  ABP: --  ABP(mean): --  RR: 19 (25 Aug 2017 04:38) (18 - 19)  SpO2: 99% (25 Aug 2017 04:38) (95% - 99%) on 2lpm     General: Awake. Alert. Cooperative. No distress. Appears stated age 	  HEENT:   AT/NC/Anicteric. PERRL/EOMI. Normal oral mucosa,  Neck: Supple. Trachea midline. Thyroid without enlargement/tenderness/nodules. No carotid bruit. No JVD	  Cardiovascular: Irregularly irregular rate and rhythm. S1 S2 normal. II/VI systolic murmru  Respiratory: Respirations unlabored. Decreased breath sounds with rales right hemithorax ~ 1/3 up  Abdomen: Soft. Non-tender. Non-distended. No organomegaly. No masses. Normal BS  Extremities: Warm to touch. LUE in hard brace  Pulses: 2+ peripheral pulses all extremities	  Skin: Normal skin color. No rashes or lesions. No ecchymoses. No cyanosis.  Warm to touch  Lymph Nodes: Cervical, supraclavicular and axillary nodes normal  Neurological: Motor and sensory examination equal and normal. A and O x 3  Psychiatry: Appropriate mood and affect.      LABS:                        9.4    14.13 )-----------( 438      ( 24 Aug 2017 07:32 )             30.2     08-24    138  |  103  |  4<L>  ----------------------------<  110<H>  4.1   |  23  |  0.37<L>    08-23    140  |  106  |  11  ----------------------------<  112<H>  5.0   |  20<L>  |  0.46<L>    Ca      8.4      08-24    Ca      7.4<L>      08-23    Phos    2.6     08-23    Phos    2.4     08-22      Mg       1.4     08-23    Mg       1.6     08-22    TPro  6.1  /  Alb  2.1<L>  /  TBili  0.3  /  DBili  x   /  AST  38  /  ALT  27  /  AlkPhos  95  08-23    TPro  7.7  /  Alb  3.2<L>  /  TBili  0.5  /  DBili  x   /  AST  22  /  ALT  27  /  AlkPhos  105  08-22    PT/INR - ( 24 Aug 2017 07:26 )   PT: 17.6 sec;   INR: 1.54 ratio         PTT - ( 24 Aug 2017 07:26 )  PTT:28.9 sec      CARDIAC MARKERS ( 23 Aug 2017 07:15 )  x     / <0.01 ng/mL / 21 U/L / x     / 1.0 ng/mL  CARDIAC MARKERS ( 22 Aug 2017 23:35 )  x     / x     / 18 U/L / x     / x      CARDIAC MARKERS ( 22 Aug 2017 23:29 )  x     / <0.01 ng/mL / x     / x     / 1.0 ng/mL  CARDIAC MARKERS ( 22 Aug 2017 13:02 )  x     / <0.01 ng/mL / x     / x     / x              MICROBIOLOGY:     Culture - Body Fluid with Gram Stain (08.24.17 @ 13:05)    Gram Stain:   Rare polymorphonuclear leukocytes per low power field  No organisms seen    Specimen Source: .Body Fluid Pleural Fluid    Culture Results:   No growth    Culture - Acid Fast - Body Fluid w/Smear (08.24.17 @ 13:05)    Acid Fast Bacilli Smear:   No acid fast bacilli seen by fluorochrome stain    Urinalysis Basic - ( 22 Aug 2017 23:24 )    Color: x / Appearance: x / SG: x / pH: x  Gluc: x / Ketone: x  / Bili: x / Urobili: x   Blood: x / Protein: x / Nitrite: x   Leuk Esterase: x / RBC: 0-2 /HPF / WBC 11-25 /HPF   Sq Epi: x / Non Sq Epi: OCC /HPF / Bacteria: Few /HPF    Culture - Urine (08.23.17 @ 03:20)    Specimen Source: .Urine Clean Catch (Midstream)    Culture Results:   No growth    Culture - Blood (08.22.17 @ 23:03)    Specimen Source: .Blood Blood-Peripheral    Culture Results:   No growth to date.    Culture - Blood (08.22.17 @ 23:03)    Specimen Source: .Blood Blood-Peripheral    Culture Results:   No growth to date.    RADIOLOGY:  [x ] Chest radiographs reviewed and interpreted by me    < from: CT Angio Chest w/ IV Cont (08.23.17 @ 15:24) >    EXAM:  CT ANGIO CHEST (W)AW IC                            PROCEDURE DATE:  08/23/2017        INTERPRETATION:  CLINICAL INFORMATION: Hypoxia and rapid atrial   fibrillation. Pneumonia on x-ray dated 8/22/2017.    PROCEDURE:   CT Pulmonary Angiogram was performed with intravenous contrast.     Intravenous contrast: 90 ml Omnipaque 350. 10 ml discarded.    Reconstructions were performed in axial thin, axial maximum intensity   projection, sagittal and coronal planes.    COMPARISON: Chest x-raydated 8/23/2017 and 8/22/2017. MRI abdomen   4/22/2009.    FINDINGS:    LUNGS AND LARGE AIRWAYS: Patent central airways. Compressive atelectasis   of the bilateral lower lobes, greater on the right, and the right middle   lobe. Foci of peripherally oriented groundglass opacities in the left   upper lobe. Small amount of paraseptal emphysema is seen in the left   upper lobe.  PLEURA: Large multiloculated right pleural effusion with thickened and   mildly hyperdense peripheral wall, indicative of pleural thickening.   Small layering left pleural effusion.  VESSELS: No evidence of pulmonary embolism. Atherosclerotic   calcifications of the thoracic aorta and coronary arteries.  HEART: There is mild dilatation of the left atrium. No pericardial   effusion.  MEDIASTINUM AND JEAN PIERRE: No lymphadenopathy.  CHEST WALL AND LOWER NECK: Within normal limits.  UPPER ABDOMEN: Partially visualized peripherally calcified right renal   cyst, as seen on prior MRI abdomen dated 4/22/2009, with increased   calcifications from prior study. Unchanged hepatic calcification. Status   post cholecystectomy.  BONES: Degenerative spondylosis of the spine. Partially visualized   comminuted spiral fracture of the left humeral mid to distal shaft.    IMPRESSION:    No evidence of pulmonary embolism.    Large multiloculated right pleural effusion with pleural thickening,   concerning for possible infectious or neoplastic etiology. Compressive   partial atelectasis of the right lower and middle lobes.    Small left pleural effusion with associated compressive atelectasis of   the left lower lobe.    Patchy groundglass opacity in the left upper lobes which may be   infectious or related to pulmonary edema given the presence of pleural   effusion.    Partially visualized acute comminuted spiral fracture of the left humeral   mid to distal shaft.     SAVAGE CID M.D., RADIOLOGY RESIDENT  This document has been electronically signed.  JEANETH ROBERTS M.D. ATTENDING RADIOLOGIST  This document has beenelectronically signed. Aug 23 2017  4:29PM          < end of copied text >  ---------------------------------------------------------------------------------------------------------  < from: Xray Chest 1 View AP/PA (08.22.17 @ 13:07) >    EXAM:  CHEST SINGLE AP OR PA                            PROCEDURE DATE:  08/22/2017        INTERPRETATION:    CLINICAL INDICATION: A. fib    TECHNIQUE: Portable frontal view of the chest.    COMPARISON: Frontal chest radiograph from 4/16/2009.    FINDINGS:     There are calcifications of the aortic arch.  There is opacification of the right lower lung field with silhouetting of   the right hemithorax, consistent with right lower lobar pneumonia. Right   pleural effusion.  No pneumothorax.  No acute osseous abnormality.      IMPRESSION:   Right lower lobar pneumonia. Right pleural effusion.      JOSHUA RICHTER M.D., RADIOLOGY RESIDENT  This document has been electronically signed.  HOLLEY KEBEDE M.D., ATTENDING RADIOLOGIST  This document has been electronically signed. Aug 22 2017  2:28PM      < end of copied text >  ---------------------------------------------------------------------------------------------------------

## 2017-08-25 NOTE — PROGRESS NOTE ADULT - SUBJECTIVE AND OBJECTIVE BOX
VITAL SIGNS    Telemetry:    Vital Signs Last 24 Hrs  T(C): 37.2 (08-25-17 @ 12:26), Max: 37.2 (08-25-17 @ 12:26)  T(F): 99 (08-25-17 @ 12:26), Max: 99 (08-25-17 @ 12:26)  HR: 85 (08-25-17 @ 12:26) (77 - 96)  BP: 130/75 (08-25-17 @ 12:26) (121/52 - 159/64)  RR: 19 (08-25-17 @ 12:26) (19 - 19)  SpO2: 99% (08-25-17 @ 12:26) (95% - 99%)            08-24 @ 07:01  -  08-25 @ 07:00  --------------------------------------------------------  IN: 2145 mL / OUT: 1510 mL / NET: 635 mL    08-25 @ 07:01  -  08-25 @ 14:51  --------------------------------------------------------  IN: 0 mL / OUT: 750 mL / NET: -750 mL      PHYSICAL EXAM    Subjective: NAD  Neurology: alert and oriented x 3, nonfocal, no gross deficits  CV :S1S2  Lungs: CTS  Abdomen: soft, NT,ND, (+ )BM  :  voiding  Extremities: -c/c/e  LUE sling      LABS  08-25    141  |  105  |  5<L>  ----------------------------<  101<H>  3.8   |  22  |  0.32<L>    Ca    8.5      25 Aug 2017 10:24                                   9.4    14.13 )-----------( 438      ( 24 Aug 2017 07:32 )             30.2          PT/INR - ( 25 Aug 2017 10:25 )   PT: 17.8 sec;   INR: 1.63 ratio         PTT - ( 24 Aug 2017 07:26 )  PTT:28.9 sec

## 2017-08-25 NOTE — PROGRESS NOTE ADULT - PROBLEM SELECTOR PLAN 5
-Found to have mag 0.7  -Pending repeat mag level   -Replete as needed  -Keep Mag >2
-Keep K >4
-Keep K >4

## 2017-08-25 NOTE — PROGRESS NOTE ADULT - ASSESSMENT
72 year old female with htn, fracture of Left arm, GERD, HLD admitted   for new onset  Afib with RVR/ PNA/ sepsis work up     1. Afib with RVR  new onset   driven by lung pathology/ pna  vr now controlled with iv cardizem and s/p dig iv  continue with metoprolol 25 mg BID PO   continue with Cardizem 30 mg po q6   continue with cardizem gtt until no longer NPO   hep gtt on hold for procedure today   f.u echo   CTangio neg for PE / right pleural effusion with pleural thickening     2. PNA/ empyema  per patient sob improving, currently on NC O2  s/p thoracentesis with purulent drainage  IV abx   f/u BC  f/u fluid studies   thoracic eval     plan for right VATS later today     3. Fracture of Left arm   med f.u     dvt ppx

## 2017-08-25 NOTE — PROGRESS NOTE ADULT - ASSESSMENT
ASSESSMENT    large multiloculated right pleural effusion with thickened and mildly hyperdense peripheral c/w pleural thickening - differential mainly includes a missed pneumonia with an empyema versus a necrotic malignant pleural efflusion - thoracentesis seems to suggest infection    atrial fibrillation with RVR now with rate control on cardizem gtt and oral beta blockers    PLAN/RECOMMENDATIONS    oxygen supplementation to keep saturation greater than 92%  VATS for decortication and definitive diagnosis  AF rate control  - cardizem gtt/metoprolol  IV fluids while NPO  on levaquin for now  routine GI/DVT prophylaxis      Will follow with you; d/w Dr. Benavides and daughter at bedside     Truman Arellano MD, [facility] - [phone]  Pulmonary Medicine

## 2017-08-25 NOTE — PROGRESS NOTE ADULT - PROBLEM SELECTOR PLAN 8
-Continue lipitor 40mg   -Check lipid profile
-Hold home dose of lisinopril and nifedipine in the setting of sepsis  -Pt may require cardizem for better bp and hr control   -Resume when bp allows
On cardizam IV

## 2017-08-25 NOTE — PROGRESS NOTE ADULT - PROBLEM SELECTOR PLAN 6
-P 2.4, replete, check phos in am
-Found to have mag 0.7  -Pending repeat mag level   -Replete as needed  -Keep Mag >2
-Replete as needed

## 2017-08-25 NOTE — BRIEF OPERATIVE NOTE - PROCEDURE
Flexible bronchoscopy of both lungs  08/25/2017    Active  [facility]  VATS (video-assisted thoracoscopic surgery)  08/25/2017    Active  [facility]  Thoracotomy  08/25/2017  Right side, drainage of empyema and decortication  [facility]

## 2017-08-25 NOTE — PROGRESS NOTE ADULT - PROBLEM SELECTOR PLAN 1
Anticipate right VATS decortication this afternoon  analgesics  maintain LUE immobilized/ shoulder sling post op  maintain chest tubes to lws post op

## 2017-08-25 NOTE — BRIEF OPERATIVE NOTE - OPERATION/FINDINGS
Procedure begun thoracoscopically. Large amount of sridevi purulence encountered - aspirate sent for culture and sensitivity, remainder suctioned (~600 mL). VATS decortication begun, but dissection hampered by notably thick rind, dense adhesions and friable pleura. Procedure converted to open thoracotomy, and decortication completed. Small diaphragmatic tear - not sutured. Three chest tubes left in place (all 28 fr), straight anterior, straight posterior apical, angled overlying diaphragm. Flexible bronchoscopy performed at close of case.

## 2017-08-25 NOTE — PROGRESS NOTE ADULT - SUBJECTIVE AND OBJECTIVE BOX
CARDIOLOGY FOLLOW UP - Dr. Grant    CC no chest pain or sob       PHYSICAL EXAM:  T(C): 36.6 (08-25-17 @ 04:38), Max: 36.9 (08-24-17 @ 20:07)  HR: 96 (08-25-17 @ 04:38) (77 - 96)  BP: 159/64 (08-25-17 @ 04:38) (121/52 - 159/64)  RR: 19 (08-25-17 @ 04:38) (18 - 19)  SpO2: 99% (08-25-17 @ 04:38) (95% - 99%)  Wt(kg): --  I&O's Summary    24 Aug 2017 07:01  -  25 Aug 2017 07:00  --------------------------------------------------------  IN: 2145 mL / OUT: 1510 mL / NET: 635 mL    25 Aug 2017 07:01  -  25 Aug 2017 09:52  --------------------------------------------------------  IN: 0 mL / OUT: 450 mL / NET: -450 mL        Appearance: Normal	  Cardiovascular: Normal S1 S2, irregular   Respiratory:+ Rales at base R > L  Gastrointestinal:  Soft, Non-tender, + BS	  Extremities: Normal range of motion, No clubbing, cyanosis or edema        MEDICATIONS  (STANDING):  levoFLOXacin IVPB   IV Intermittent   levoFLOXacin IVPB 500 milliGRAM(s) IV Intermittent every 24 hours  pantoprazole    Tablet 40 milliGRAM(s) Oral before breakfast  atorvastatin 40 milliGRAM(s) Oral at bedtime  aspirin  chewable 81 milliGRAM(s) Oral daily  zolpidem 5 milliGRAM(s) Oral at bedtime  polyethylene glycol 3350 17 Gram(s) Oral daily  sodium chloride 0.9%. 1000 milliLiter(s) (55 mL/Hr) IV Continuous <Continuous>  metoprolol 25 milliGRAM(s) Oral two times a day  diltiazem Infusion 5 mG/Hr (5 mL/Hr) IV Continuous <Continuous>  sodium chloride 0.9%. 1000 milliLiter(s) (100 mL/Hr) IV Continuous <Continuous>  diltiazem    Tablet 30 milliGRAM(s) Oral four times a day  chlorhexidine 4% Liquid 1 Application(s) Topical two times a day      TELEMETRY: 	Afib hr 60-80     DIAGNOSTIC TESTING:  [ ] Echocardiogram: Pending   [ ]  Catheterization:  [ ] Stress Test:    OTHER: 	  < from: Xray Chest 1 View AP/PA (08.24.17 @ 11:24) >  IMPRESSION:    1.  No pneumothorax.      < end of copied text >    LABS:	 	                            9.4    14.13 )-----------( 438      ( 24 Aug 2017 07:32 )             30.2     08-24    138  |  103  |  4<L>  ----------------------------<  110<H>  4.1   |  23  |  0.37<L>    Ca    8.4      24 Aug 2017 07:28      PT/INR - ( 24 Aug 2017 07:26 )   PT: 17.6 sec;   INR: 1.54 ratio         PTT - ( 24 Aug 2017 07:26 )  PTT:28.9 sec

## 2017-08-25 NOTE — PROGRESS NOTE ADULT - ASSESSMENT
73 y/o female h/o HTN, HLD Left spiral humerus fracture s/p mechanical fall. Presented with new onset afib at [facility] for ORIF left Humeral nonunion . CT chest demonstrated right lower hemithorax collection.  8/24 Right sided thoracentesis yields 100cc thick purulent fluid  8/25 Right VATS decortication

## 2017-08-25 NOTE — PROGRESS NOTE ADULT - PROBLEM SELECTOR PLAN 10
-Continue ambien, pt has been taking for many years

## 2017-08-26 LAB
ALBUMIN SERPL ELPH-MCNC: 2.2 G/DL — LOW (ref 3.3–5)
ALP SERPL-CCNC: 69 U/L — SIGNIFICANT CHANGE UP (ref 40–120)
ALT FLD-CCNC: 22 U/L RC — SIGNIFICANT CHANGE UP (ref 10–45)
ANION GAP SERPL CALC-SCNC: 16 MMOL/L — SIGNIFICANT CHANGE UP (ref 5–17)
APTT BLD: 31.7 SEC — SIGNIFICANT CHANGE UP (ref 27.5–37.4)
AST SERPL-CCNC: 24 U/L — SIGNIFICANT CHANGE UP (ref 10–40)
BILIRUB SERPL-MCNC: 1.3 MG/DL — HIGH (ref 0.2–1.2)
BUN SERPL-MCNC: 5 MG/DL — LOW (ref 7–23)
CALCIUM SERPL-MCNC: 7.2 MG/DL — LOW (ref 8.4–10.5)
CHLORIDE SERPL-SCNC: 102 MMOL/L — SIGNIFICANT CHANGE UP (ref 96–108)
CO2 SERPL-SCNC: 22 MMOL/L — SIGNIFICANT CHANGE UP (ref 22–31)
CREAT SERPL-MCNC: 0.41 MG/DL — LOW (ref 0.5–1.3)
GAS PNL BLDA: SIGNIFICANT CHANGE UP
GLUCOSE SERPL-MCNC: 193 MG/DL — HIGH (ref 70–99)
GRAM STN FLD: SIGNIFICANT CHANGE UP
GRAM STN FLD: SIGNIFICANT CHANGE UP
HCT VFR BLD CALC: 32.9 % — LOW (ref 34.5–45)
HGB BLD-MCNC: 11.4 G/DL — LOW (ref 11.5–15.5)
INR BLD: 1.45 RATIO — HIGH (ref 0.88–1.16)
MCHC RBC-ENTMCNC: 31.9 PG — SIGNIFICANT CHANGE UP (ref 27–34)
MCHC RBC-ENTMCNC: 34.5 GM/DL — SIGNIFICANT CHANGE UP (ref 32–36)
MCV RBC AUTO: 92.2 FL — SIGNIFICANT CHANGE UP (ref 80–100)
PLATELET # BLD AUTO: 344 K/UL — SIGNIFICANT CHANGE UP (ref 150–400)
POTASSIUM SERPL-MCNC: 4.1 MMOL/L — SIGNIFICANT CHANGE UP (ref 3.5–5.3)
POTASSIUM SERPL-SCNC: 4.1 MMOL/L — SIGNIFICANT CHANGE UP (ref 3.5–5.3)
PROT SERPL-MCNC: 4.9 G/DL — LOW (ref 6–8.3)
PROTHROM AB SERPL-ACNC: 15.8 SEC — HIGH (ref 9.8–12.7)
RBC # BLD: 3.57 M/UL — LOW (ref 3.8–5.2)
RBC # FLD: 12.5 % — SIGNIFICANT CHANGE UP (ref 10.3–14.5)
SODIUM SERPL-SCNC: 140 MMOL/L — SIGNIFICANT CHANGE UP (ref 135–145)
SPECIMEN SOURCE: SIGNIFICANT CHANGE UP
SPECIMEN SOURCE: SIGNIFICANT CHANGE UP
WBC # BLD: 28.5 K/UL — HIGH (ref 3.8–10.5)
WBC # FLD AUTO: 28.5 K/UL — HIGH (ref 3.8–10.5)

## 2017-08-26 PROCEDURE — 71010: CPT | Mod: 26

## 2017-08-26 PROCEDURE — 99223 1ST HOSP IP/OBS HIGH 75: CPT | Mod: GC

## 2017-08-26 PROCEDURE — 93010 ELECTROCARDIOGRAM REPORT: CPT

## 2017-08-26 RX ORDER — INSULIN LISPRO 100/ML
VIAL (ML) SUBCUTANEOUS AT BEDTIME
Qty: 0 | Refills: 0 | Status: DISCONTINUED | OUTPATIENT
Start: 2017-08-26 | End: 2017-08-28

## 2017-08-26 RX ORDER — ALBUMIN HUMAN 25 %
250 VIAL (ML) INTRAVENOUS
Qty: 0 | Refills: 0 | Status: COMPLETED | OUTPATIENT
Start: 2017-08-26 | End: 2017-08-26

## 2017-08-26 RX ORDER — DEXTROSE 50 % IN WATER 50 %
25 SYRINGE (ML) INTRAVENOUS ONCE
Qty: 0 | Refills: 0 | Status: DISCONTINUED | OUTPATIENT
Start: 2017-08-26 | End: 2017-08-28

## 2017-08-26 RX ORDER — DEXTROSE 50 % IN WATER 50 %
1 SYRINGE (ML) INTRAVENOUS ONCE
Qty: 0 | Refills: 0 | Status: DISCONTINUED | OUTPATIENT
Start: 2017-08-26 | End: 2017-08-28

## 2017-08-26 RX ORDER — OXYCODONE AND ACETAMINOPHEN 5; 325 MG/1; MG/1
1 TABLET ORAL EVERY 4 HOURS
Qty: 0 | Refills: 0 | Status: DISCONTINUED | OUTPATIENT
Start: 2017-08-26 | End: 2017-08-26

## 2017-08-26 RX ORDER — POTASSIUM CHLORIDE 20 MEQ
10 PACKET (EA) ORAL ONCE
Qty: 0 | Refills: 0 | Status: COMPLETED | OUTPATIENT
Start: 2017-08-26 | End: 2017-08-26

## 2017-08-26 RX ORDER — SODIUM CHLORIDE 9 MG/ML
1000 INJECTION, SOLUTION INTRAVENOUS
Qty: 0 | Refills: 0 | Status: DISCONTINUED | OUTPATIENT
Start: 2017-08-26 | End: 2017-08-28

## 2017-08-26 RX ORDER — DEXTROSE 50 % IN WATER 50 %
12.5 SYRINGE (ML) INTRAVENOUS ONCE
Qty: 0 | Refills: 0 | Status: DISCONTINUED | OUTPATIENT
Start: 2017-08-26 | End: 2017-08-28

## 2017-08-26 RX ORDER — HYDROMORPHONE HYDROCHLORIDE 2 MG/ML
0.5 INJECTION INTRAMUSCULAR; INTRAVENOUS; SUBCUTANEOUS ONCE
Qty: 0 | Refills: 0 | Status: DISCONTINUED | OUTPATIENT
Start: 2017-08-26 | End: 2017-08-26

## 2017-08-26 RX ORDER — PROPOFOL 10 MG/ML
19.57 INJECTION, EMULSION INTRAVENOUS
Qty: 500 | Refills: 0 | Status: DISCONTINUED | OUTPATIENT
Start: 2017-08-26 | End: 2017-08-26

## 2017-08-26 RX ORDER — FENTANYL CITRATE 50 UG/ML
25 INJECTION INTRAVENOUS ONCE
Qty: 0 | Refills: 0 | Status: DISCONTINUED | OUTPATIENT
Start: 2017-08-26 | End: 2017-08-26

## 2017-08-26 RX ORDER — INSULIN LISPRO 100/ML
VIAL (ML) SUBCUTANEOUS
Qty: 0 | Refills: 0 | Status: DISCONTINUED | OUTPATIENT
Start: 2017-08-26 | End: 2017-08-28

## 2017-08-26 RX ORDER — SODIUM CHLORIDE 9 MG/ML
500 INJECTION INTRAMUSCULAR; INTRAVENOUS; SUBCUTANEOUS ONCE
Qty: 0 | Refills: 0 | Status: COMPLETED | OUTPATIENT
Start: 2017-08-26 | End: 2017-08-26

## 2017-08-26 RX ORDER — ACETAMINOPHEN 500 MG
1000 TABLET ORAL ONCE
Qty: 0 | Refills: 0 | Status: COMPLETED | OUTPATIENT
Start: 2017-08-26 | End: 2017-08-26

## 2017-08-26 RX ORDER — SODIUM CHLORIDE 9 MG/ML
500 INJECTION INTRAMUSCULAR; INTRAVENOUS; SUBCUTANEOUS
Qty: 0 | Refills: 0 | Status: COMPLETED | OUTPATIENT
Start: 2017-08-26 | End: 2017-08-26

## 2017-08-26 RX ORDER — OXYCODONE AND ACETAMINOPHEN 5; 325 MG/1; MG/1
2 TABLET ORAL EVERY 4 HOURS
Qty: 0 | Refills: 0 | Status: DISCONTINUED | OUTPATIENT
Start: 2017-08-26 | End: 2017-09-02

## 2017-08-26 RX ORDER — GLUCAGON INJECTION, SOLUTION 0.5 MG/.1ML
1 INJECTION, SOLUTION SUBCUTANEOUS ONCE
Qty: 0 | Refills: 0 | Status: DISCONTINUED | OUTPATIENT
Start: 2017-08-26 | End: 2017-08-28

## 2017-08-26 RX ADMIN — HYDROMORPHONE HYDROCHLORIDE 0.5 MILLIGRAM(S): 2 INJECTION INTRAMUSCULAR; INTRAVENOUS; SUBCUTANEOUS at 08:03

## 2017-08-26 RX ADMIN — ATORVASTATIN CALCIUM 40 MILLIGRAM(S): 80 TABLET, FILM COATED ORAL at 22:43

## 2017-08-26 RX ADMIN — OXYCODONE AND ACETAMINOPHEN 2 TABLET(S): 5; 325 TABLET ORAL at 21:00

## 2017-08-26 RX ADMIN — ENOXAPARIN SODIUM 40 MILLIGRAM(S): 100 INJECTION SUBCUTANEOUS at 00:05

## 2017-08-26 RX ADMIN — FENTANYL CITRATE 25 MICROGRAM(S): 50 INJECTION INTRAVENOUS at 13:12

## 2017-08-26 RX ADMIN — Medication 1500 MILLILITER(S): at 05:45

## 2017-08-26 RX ADMIN — FAMOTIDINE 20 MILLIGRAM(S): 10 INJECTION INTRAVENOUS at 22:43

## 2017-08-26 RX ADMIN — HYDROMORPHONE HYDROCHLORIDE 0.5 MILLIGRAM(S): 2 INJECTION INTRAMUSCULAR; INTRAVENOUS; SUBCUTANEOUS at 08:19

## 2017-08-26 RX ADMIN — MEROPENEM 200 MILLIGRAM(S): 1 INJECTION INTRAVENOUS at 22:51

## 2017-08-26 RX ADMIN — Medication 200 MILLIGRAM(S): at 12:57

## 2017-08-26 RX ADMIN — HYDROMORPHONE HYDROCHLORIDE 0.5 MILLIGRAM(S): 2 INJECTION INTRAMUSCULAR; INTRAVENOUS; SUBCUTANEOUS at 01:00

## 2017-08-26 RX ADMIN — Medication 250 MILLIGRAM(S): at 02:31

## 2017-08-26 RX ADMIN — Medication 100 MILLIGRAM(S): at 22:43

## 2017-08-26 RX ADMIN — FENTANYL CITRATE 25 MICROGRAM(S): 50 INJECTION INTRAVENOUS at 15:57

## 2017-08-26 RX ADMIN — FAMOTIDINE 20 MILLIGRAM(S): 10 INJECTION INTRAVENOUS at 12:44

## 2017-08-26 RX ADMIN — OXYCODONE AND ACETAMINOPHEN 2 TABLET(S): 5; 325 TABLET ORAL at 20:01

## 2017-08-26 RX ADMIN — MEROPENEM 200 MILLIGRAM(S): 1 INJECTION INTRAVENOUS at 08:46

## 2017-08-26 RX ADMIN — Medication 50 MILLIEQUIVALENT(S): at 08:03

## 2017-08-26 RX ADMIN — Medication 50 MILLIEQUIVALENT(S): at 23:45

## 2017-08-26 RX ADMIN — MEROPENEM 200 MILLIGRAM(S): 1 INJECTION INTRAVENOUS at 15:43

## 2017-08-26 RX ADMIN — MEROPENEM 200 MILLIGRAM(S): 1 INJECTION INTRAVENOUS at 00:05

## 2017-08-26 RX ADMIN — SODIUM CHLORIDE 3000 MILLILITER(S): 9 INJECTION INTRAMUSCULAR; INTRAVENOUS; SUBCUTANEOUS at 03:30

## 2017-08-26 RX ADMIN — HYDROMORPHONE HYDROCHLORIDE 0.5 MILLIGRAM(S): 2 INJECTION INTRAMUSCULAR; INTRAVENOUS; SUBCUTANEOUS at 01:15

## 2017-08-26 RX ADMIN — FAMOTIDINE 20 MILLIGRAM(S): 10 INJECTION INTRAVENOUS at 00:05

## 2017-08-26 RX ADMIN — FENTANYL CITRATE 25 MICROGRAM(S): 50 INJECTION INTRAVENOUS at 16:12

## 2017-08-26 RX ADMIN — Medication 1000 MILLIGRAM(S): at 12:59

## 2017-08-26 RX ADMIN — ENOXAPARIN SODIUM 40 MILLIGRAM(S): 100 INJECTION SUBCUTANEOUS at 12:56

## 2017-08-26 RX ADMIN — Medication 1500 MILLILITER(S): at 04:45

## 2017-08-26 RX ADMIN — Medication 50 GRAM(S): at 00:03

## 2017-08-26 RX ADMIN — Medication 50 MILLIEQUIVALENT(S): at 01:52

## 2017-08-26 RX ADMIN — SODIUM CHLORIDE 75 MILLILITER(S): 9 INJECTION, SOLUTION INTRAVENOUS at 04:37

## 2017-08-26 RX ADMIN — SODIUM CHLORIDE 1000 MILLILITER(S): 9 INJECTION INTRAMUSCULAR; INTRAVENOUS; SUBCUTANEOUS at 04:39

## 2017-08-26 RX ADMIN — Medication 250 MILLIGRAM(S): at 13:32

## 2017-08-26 RX ADMIN — PROPOFOL 6 MICROGRAM(S)/KG/MIN: 10 INJECTION, EMULSION INTRAVENOUS at 05:34

## 2017-08-26 RX ADMIN — FENTANYL CITRATE 25 MICROGRAM(S): 50 INJECTION INTRAVENOUS at 12:44

## 2017-08-26 RX ADMIN — Medication 81 MILLIGRAM(S): at 12:44

## 2017-08-26 RX ADMIN — Medication 50 MILLIEQUIVALENT(S): at 01:00

## 2017-08-26 NOTE — PROGRESS NOTE ADULT - SUBJECTIVE AND OBJECTIVE BOX
Afebrile. Extubated. In no respiratory distress on 5 liters nasal O2-- O2 sat 97%  Chest tubes in place     Vital Signs Last 24 Hrs  T(C): 36 (26 Aug 2017 11:00), Max: 36 (26 Aug 2017 07:00)  T(F): 96.8 (26 Aug 2017 11:00), Max: 96.8 (26 Aug 2017 07:00)  HR: 105 (26 Aug 2017 14:00) (63 - 131)  BP: --  BP(mean): --  RR: 11 (26 Aug 2017 14:00) (11 - 25)  SpO2: 97% (26 Aug 2017 14:00) (97% - 100%)    Medications:  MEDICATIONS  (STANDING):  atorvastatin 40 milliGRAM(s) Oral at bedtime  aspirin  chewable 81 milliGRAM(s) Oral daily  diltiazem Infusion 5 mG/Hr (5 mL/Hr) IV Continuous <Continuous>  docusate sodium 100 milliGRAM(s) Oral three times a day  vancomycin  IVPB 1000 milliGRAM(s) IV Intermittent every 12 hours  meropenem IVPB   IV Intermittent   meropenem IVPB 1000 milliGRAM(s) IV Intermittent every 8 hours  enoxaparin Injectable 40 milliGRAM(s) SubCutaneous daily  famotidine Injectable 20 milliGRAM(s) IV Push two times a day  multiple electrolytes Injection Type 1 1000 milliLiter(s) (75 mL/Hr) IV Continuous <Continuous>    MEDICATIONS  (PRN):      Allergies    No Known Allergies    Intolerances        Physical Examination:    Pleasant  Neck: no JVD, LAD, accessory muscle use  PULM: Mildly decreased breath sounds on right  CVS: IRR  Abdomen: nontender  Extremities: sequential TEDS in place.    LAB:  WBC 28,500  creatinine 0.41  ABG ( 5L)-- 7.43/38/99 98%  Pleural fluid culture-- Fusobacterium nucleatum    Plan:  1. Will check CXR.  2. Incentive spirometry.  3. On SQ Lovenox and sequential ERIC's.  4. Currently on Vancomycin and Meropenem. Recommend ID consult.    POC: Trumna Vazquez M.D.  [phone]

## 2017-08-26 NOTE — PROVIDER CONTACT NOTE (CRITICAL VALUE NOTIFICATION) - BACKGROUND
Patient admitted to unit with Rapid Atrial Fibrilation
s/p thoracotomy for infected empyema in pleural space
s/p thoracotomy 8/25

## 2017-08-26 NOTE — CONSULT NOTE ADULT - SUBJECTIVE AND OBJECTIVE BOX
HPI:  72 year old female with hx of HTN, HLD, gerd, insomnia s/p mechanical fall in 5/2017 with left humeral fracture sent from preop testing for Afib with RVR. Pt scheduled to undergo ORIF of left upper extremity tomorrow, now admitted for workup of Afib/rvr/sepsis. Denies chest pain, palpitations, shortness of breath, abdominal pain, fevers, chills, cough, dysuria, hematuria, melena, hematochezia. No headaches, dizziness, LOC. No sick contacts or travel history. Chronic smoking history over 50 pack years, quit in May 2017, never had CT scan. Reports decreased appetite and weight loss of 5-10lbs in the past month. Lives at home with family, ambulates without cane/ walker, ambulation has been limited since the fall/farcture. (22 Aug 2017 20:06)      PAST MEDICAL & SURGICAL HISTORY:  Displaced spiral fracture of shaft of humerus, left arm, subsequent encounter for fracture with routine healing  Insomnia  GERD (gastroesophageal reflux disease)  HLD (hyperlipidemia)  HTN (hypertension)      Allergies  No Known Allergies        ANTIMICROBIALS:  vancomycin  IVPB 1000 every 12 hours  meropenem IVPB    meropenem IVPB 1000 every 8 hours      OTHER MEDS:  atorvastatin 40 milliGRAM(s) Oral at bedtime  aspirin  chewable 81 milliGRAM(s) Oral daily  diltiazem Infusion 5 mG/Hr IV Continuous <Continuous>  docusate sodium 100 milliGRAM(s) Oral three times a day  enoxaparin Injectable 40 milliGRAM(s) SubCutaneous daily  famotidine Injectable 20 milliGRAM(s) IV Push two times a day  multiple electrolytes Injection Type 1 1000 milliLiter(s) IV Continuous <Continuous>  dextrose 5%. 1000 milliLiter(s) IV Continuous <Continuous>  dextrose Gel 1 Dose(s) Oral once PRN  dextrose 50% Injectable 12.5 Gram(s) IV Push once  dextrose 50% Injectable 25 Gram(s) IV Push once  dextrose 50% Injectable 25 Gram(s) IV Push once  glucagon  Injectable 1 milliGRAM(s) IntraMuscular once PRN  insulin lispro (HumaLOG) corrective regimen sliding scale   SubCutaneous three times a day before meals  insulin lispro (HumaLOG) corrective regimen sliding scale   SubCutaneous at bedtime  oxyCODONE    5 mG/acetaminophen 325 mG 2 Tablet(s) Oral every 4 hours PRN  oxyCODONE    5 mG/acetaminophen 325 mG 1 Tablet(s) Oral every 4 hours PRN      SOCIAL HISTORY: [ ] etoh [ ] tobacco [ ] former smoker [ ] IVDU    FAMILY HISTORY:      REVIEW OF SYSTEMS  [  ] ROS unobtainable because:    [  ] All other systems negative except as noted below:	    Constitutional:  [ ] fever [ ] weight loss  Skin:  [ ] rash [ ] phlebitis	  Eyes: [ ] icterus [ ] inflammation	  ENMT: [ ] discharge [ ] thrush [ ] ulcers [ ] exudates  Respiratory: [ ] dyspnea [ ] hemoptysis [ ] cough [ ] sputum	  Cardiovascular:  [ ] chest pain [ ] palpitations [ ] edema	  Gastrointestinal:  [ ] nausea [ ] vomiting [ ] diarrhea [ ] constipation [ ] pain	  Genitourinary:  [ ] dysuria [ ] frequency [ ] hematuria [ ] discharge [ ] flank pain  Musculoskeletal:  [ ] myalgias [ ] arthralgias [ ] arthritis	  Neurological:  [ ] headache [ ] seizures	  Psychiatric:  [ ] anxiety [ ] depression	  Hematology/Lymphatics:  [ ] lymphadenopathy  Endocrine:  [ ] adrenal [ ] thyroid  Allergic/Immunologic:	 [ ] transplant [ ] seasonal    Vital Signs Last 24 Hrs  T(F): 97 (08-26-17 @ 15:00), Max: 208.4 (08-22-17 @ 13:33)    Vital Signs Last 24 Hrs  HR: 108 (08-26-17 @ 16:30) (63 - 131)  BP: 128/56 (08-26-17 @ 16:00) (128/56 - 132/56)  RR: 16 (08-26-17 @ 16:30)  SpO2: 97% (08-26-17 @ 16:30) (97% - 100%)  Wt(kg): --    PHYSICAL EXAM:  General: [ ] non-toxic  HEAD/EYES: [ ] PERRL [ ] white sclera [ ] icterus  ENT:  [ ] normal [ ] supple [ ] thrush [ ] pharyngeal exudate  Cardiovascular:   [ ] murmur [ ] normal [ ] PPM/AICD  Respiratory:  [ ] clear to ausculation bilaterally  GI:  [ ] soft, non-tender, normal bowel sounds  :  [ ] maxwell [ ] no CVA tenderness   Musculoskeletal:  [ ] no synovitis  Neurologic:  [ ] non-focal exam   Skin:  [ ] no rash  Lymph: [ ] no lymphadenopathy  Psychiatric:  [ ] appropriate affect [ ] alert & oriented  Lines:  [ ] no phlebitis [ ] central line                                11.4   28.5  )-----------( 344      ( 26 Aug 2017 01:52 )             32.9       08-26    140  |  102  |  5<L>  ----------------------------<  193<H>  4.1   |  22  |  0.41<L>    Ca    7.2<L>      26 Aug 2017 01:52    TPro  4.9<L>  /  Alb  2.2<L>  /  TBili  1.3<H>  /  DBili  x   /  AST  24  /  ALT  22  /  AlkPhos  69  08-26          MICROBIOLOGY:          v            RADIOLOGY: HPI:  72 year old female with hx of HTN, HLD, gerd, insomnia s/p mechanical fall in 5/2017 with left humeral fracture sent from preop testing for Afib with RVR. Pt was scheduled to undergo ORIF of left upper extremity and in the preop areas was found to be septic and tachycardic, was sent to the er and xray showed loculated effusion and ct eventually showed multiloculated effusion on the right side. it was drained on the 24th and showed neutrophil predominance and empyema was suspected or confirmed, and she underwent VATS, thoracotomy and decortication yesterday and transferred to CTICU. 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 We were called for further antibiotic management She still has persistent white count and that could be because she was on Levofloxacin so far and that would not cover, and was started on meropenem and vancomycin yesterday     during the  surgery- around 600 ml pus was drained and decortication of pleura done, now has 3 chest tubes and no purulent discharge     PAST MEDICAL & SURGICAL HISTORY:  Displaced spiral fracture of shaft of humerus, left arm, subsequent encounter for fracture with routine healing  Insomnia  GERD (gastroesophageal reflux disease)  HLD (hyperlipidemia)  HTN (hypertension)      Allergies  No Known Allergies        ANTIMICROBIALS:  vancomycin  IVPB 1000 every 12 hours  meropenem IVPB    meropenem IVPB 1000 every 8 hours      OTHER MEDS:  atorvastatin 40 milliGRAM(s) Oral at bedtime  aspirin  chewable 81 milliGRAM(s) Oral daily  diltiazem Infusion 5 mG/Hr IV Continuous <Continuous>  docusate sodium 100 milliGRAM(s) Oral three times a day  enoxaparin Injectable 40 milliGRAM(s) SubCutaneous daily  famotidine Injectable 20 milliGRAM(s) IV Push two times a day  multiple electrolytes Injection Type 1 1000 milliLiter(s) IV Continuous <Continuous>  dextrose 5%. 1000 milliLiter(s) IV Continuous <Continuous>  dextrose Gel 1 Dose(s) Oral once PRN  dextrose 50% Injectable 12.5 Gram(s) IV Push once  dextrose 50% Injectable 25 Gram(s) IV Push once  dextrose 50% Injectable 25 Gram(s) IV Push once  glucagon  Injectable 1 milliGRAM(s) IntraMuscular once PRN  insulin lispro (HumaLOG) corrective regimen sliding scale   SubCutaneous three times a day before meals  insulin lispro (HumaLOG) corrective regimen sliding scale   SubCutaneous at bedtime  oxyCODONE    5 mG/acetaminophen 325 mG 2 Tablet(s) Oral every 4 hours PRN  oxyCODONE    5 mG/acetaminophen 325 mG 1 Tablet(s) Oral every 4 hours PRN      SOCIAL HISTORY: smoker     FAMILY HISTORY: non drikner       REVIEW OF SYSTEMS  [  ] ROS unobtainable because:    [x  ] All other systems negative except as noted below:	    Constitutional:  [ ] fever [ ] weight loss  Skin:  [ ] rash [ ] phlebitis	  Eyes: [ ] icterus [ ] inflammation	  ENMT: [ ] discharge [ ] thrush [ ] ulcers [ ] exudates  Respiratory: [ x] dyspnea [ ] hemoptysis [x ] cough [ ] sputum	  Cardiovascular:  [ ] chest pain [ ] palpitations [ ] edema	  Gastrointestinal:  [ ] nausea [ ] vomiting [ ] diarrhea [ ] constipation [ ] pain	  Genitourinary:  [ ] dysuria [ ] frequency [ ] hematuria [ ] discharge [ ] flank pain  Musculoskeletal:  [ ] myalgias [ ] arthralgias [ ] arthritis	  Neurological:  [ ] headache [ ] seizures	  Psychiatric:  [ ] anxiety [ ] depression	  Hematology/Lymphatics:  [ ] lymphadenopathy  Endocrine:  [ ] adrenal [ ] thyroid  Allergic/Immunologic:	 [ ] transplant [ ] seasonal    Vital Signs Last 24 Hrs  T(F): 97 (08-26-17 @ 15:00), Max: 208.4 (08-22-17 @ 13:33)    Vital Signs Last 24 Hrs  HR: 108 (08-26-17 @ 16:30) (63 - 131)  BP: 128/56 (08-26-17 @ 16:00) (128/56 - 132/56)  RR: 16 (08-26-17 @ 16:30)  SpO2: 97% (08-26-17 @ 16:30) (97% - 100%)  Wt(kg): --    PHYSICAL EXAM:     General: non-toxic  HEAD/EYES: anicteric, PERRL  ENT:  supple  Cardiovascular:   S1, S2  Respiratory:  pt has three chest tubes, not draining purulence   GI:  soft, non-tender, normal bowel sounds  :  no CVA tenderness   Musculoskeletal:  no synovitis  Neurologic:  grossly non-focal  Skin:  no rash  Lymph: no lymphadenopathy  Psychiatric:  appropriate affect  Vascular:  right ij central line and peripheral lines, no redness                                   11.4   28.5  )-----------( 344      ( 26 Aug 2017 01:52 )             32.9       08-26    140  |  102  |  5<L>  ----------------------------<  193<H>  4.1   |  22  |  0.41<L>    Ca    7.2<L>      26 Aug 2017 01:52    TPro  4.9<L>  /  Alb  2.2<L>  /  TBili  1.3<H>  /  DBili  x   /  AST  24  /  ALT  22  /  AlkPhos  69  08-26          MICROBIOLOGY: pleural fuid culture- fusobacterium nucleatum   pleural culture gram negative rods   blood cx negative           v            RADIOLOGY: ct chest- multiloculated pleural effusion

## 2017-08-26 NOTE — PROGRESS NOTE ADULT - ASSESSMENT
72 year old female s/p right VATS - right thoracotomy for decortication and removal of empyema; extubated, in atrial fibrillation in with rate between 100-120bpms on cardizem drip.     1. Montior CT drainage, RP#3 with air leak  2. cardizem drip for Atrial Fibrillation as tolerated  3. Vancomycin, Meropenem - F/U ID and cultures  4. VTE ppx  5. GI ppx  6. Pain management

## 2017-08-26 NOTE — CONSULT NOTE ADULT - ASSESSMENT
72 year old female with hx of HTN, HLD, gerd, insomnia s/p mechanical fall in 5/2017 with left humeral fracture sent from preop testing for Afib with RVR. Pt was scheduled to undergo ORIF of left upper extremity and in the preop areas was found to be septic and tachycardic, was sent to the er and xray showed loculated effusion and ct eventually showed multiloculated effusion on the right side. it was drained on the 24th and showed neutrophil predominance and empyema was suspected or confirmed, and she underwent VATS, thoracotomy and decortication yesterday and transferred to CTICU. 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 We were called for further antibiotic management She still has persistent white count and that could be because she was on Levofloxacin so far and that would not cover, and was started on meropenem and vancomycin yesterday   during the  surgery- around 600 ml pus was drained and decortication of pleura done, now has 3 chest tubes and no purulent discharge     Please d/c vancomycin as pt does not need it at this time,   Would d/c meropenem and switch to zosyn 3.3.75 q 8, bp stable,  will follow up further cx as they become available   Will d/w attending and follow up

## 2017-08-26 NOTE — PROGRESS NOTE ADULT - SUBJECTIVE AND OBJECTIVE BOX
CC: pt s/p throacotomy    TELEMETRY: AF 80's    PHYSICAL EXAM:    T(C): 36 (08-26-17 @ 07:00), Max: 37.2 (08-25-17 @ 12:26)  HR: 80 (08-26-17 @ 10:45) (63 - 131)  BP: 130/75 (08-25-17 @ 12:26) (130/75 - 130/75)  RR: 12 (08-26-17 @ 10:45) (12 - 25)  SpO2: 100% (08-26-17 @ 10:45) (99% - 100%)  Wt(kg): --  I&O's Summary    25 Aug 2017 07:01  -  26 Aug 2017 07:00  --------------------------------------------------------  IN: 1410 mL / OUT: 1625 mL / NET: -215 mL    26 Aug 2017 07:01  -  26 Aug 2017 10:57  --------------------------------------------------------  IN: 408 mL / OUT: 180 mL / NET: 228 mL        Appearance: Normal	  Cardiovascular: Normal S1 S2,RRR, No JVD, No murmurs  Respiratory: Lungs clear to auscultation	  Gastrointestinal:  Soft, Non-tender, + BS	  Extremities: Normal range of motion, No clubbing, cyanosis or edema  Vascular: Peripheral pulses palpable 2+ bilaterally     LABS:	 	                          11.4   28.5  )-----------( 344      ( 26 Aug 2017 01:52 )             32.9     08-26    140  |  102  |  5<L>  ----------------------------<  193<H>  4.1   |  22  |  0.41<L>    Ca    7.2<L>      26 Aug 2017 01:52    TPro  4.9<L>  /  Alb  2.2<L>  /  TBili  1.3<H>  /  DBili  x   /  AST  24  /  ALT  22  /  AlkPhos  69  08-26      PT/INR - ( 26 Aug 2017 01:52 )   PT: 15.8 sec;   INR: 1.45 ratio         PTT - ( 26 Aug 2017 01:52 )  PTT:31.7 sec    CARDIAC MARKERS:

## 2017-08-26 NOTE — PROVIDER CONTACT NOTE (CRITICAL VALUE NOTIFICATION) - ASSESSMENT
pt intubated, sedated, hemodynamically stable and afebrile at this time
Hemoglobin went from 11.5 to 4.2 and Hematocrit went from 36.5 to 13. 3. Patient has fluids going through arm CBC was drawn from.
pt awake, alert, VSS pt in afib and on 5L NC. afebrile

## 2017-08-26 NOTE — PROGRESS NOTE ADULT - SUBJECTIVE AND OBJECTIVE BOX
Operation: Right VATS to Right thoracotomy for decrotication and removal of empyema   POD: 1  Narrative: resting comfortably in bed    Vital Signs Last 24 Hrs  T(C): 36.1 (26 Aug 2017 15:00), Max: 36.1 (26 Aug 2017 15:00)  T(F): 97 (26 Aug 2017 15:00), Max: 97 (26 Aug 2017 15:00)  HR: 108 (26 Aug 2017 16:00) (63 - 131)  BP: 128/56 (26 Aug 2017 16:00) (128/56 - 132/56)  BP(mean): 80 (26 Aug 2017 16:00) (80 - 81)  RR: 17 (26 Aug 2017 16:00) (11 - 25)  SpO2: 98% (26 Aug 2017 16:00) (97% - 100%)  I&O's Detail    25 Aug 2017 07:01  -  26 Aug 2017 07:00  --------------------------------------------------------  IN:    Albumin 5%  - 250 mL: 500 mL    diltiazem Infusion: 55 mL    IV PiggyBack: 500 mL    multiple electrolytes Injection Type 1: 300 mL    propofol Infusion: 55 mL  Total IN: 1410 mL    OUT:    Chest Tube: 170 mL    Chest Tube: 55 mL    Chest Tube: 45 mL    Indwelling Catheter - Urethral: 605 mL    Voided: 750 mL  Total OUT: 1625 mL    Total NET: -215 mL      26 Aug 2017 07:01  -  26 Aug 2017 16:13  --------------------------------------------------------  IN:    IV PiggyBack: 500 mL    multiple electrolytes Injection Type 1: 675 mL    propofol Infusion: 38.5 mL  Total IN: 1213.5 mL    OUT:    Chest Tube: 20 mL    Chest Tube: 85 mL    Chest Tube: 20 mL    Indwelling Catheter - Urethral: 300 mL  Total OUT: 425 mL    Total NET: 788.5 mL    LABS:                       11.4   28.5  )-----------( 344      ( 26 Aug 2017 01:52 )             32.9     08-26    140  |  102  |  5<L>  ----------------------------<  193<H>  4.1   |  22  |  0.41<L>    Ca    7.2<L>      26 Aug 2017 01:52    TPro  4.9<L>  /  Alb  2.2<L>  /  TBili  1.3<H>  /  DBili  x   /  AST  24  /  ALT  22  /  AlkPhos  69  08-26    PT/INR - ( 26 Aug 2017 01:52 )   PT: 15.8 sec;   INR: 1.45 ratio         PTT - ( 26 Aug 2017 01:52 )  PTT:31.7 sec  ABG - ( 26 Aug 2017 12:24 )  pH: 7.43  /  pCO2: 38    /  pO2: 99    / HCO3: 25    / Base Excess: 1.2   /  SaO2: 98        MEDICATIONS  (STANDING):  atorvastatin 40 milliGRAM(s) Oral at bedtime  aspirin  chewable 81 milliGRAM(s) Oral daily  diltiazem Infusion 5 mG/Hr (5 mL/Hr) IV Continuous <Continuous>  docusate sodium 100 milliGRAM(s) Oral three times a day  vancomycin  IVPB 1000 milliGRAM(s) IV Intermittent every 12 hours  meropenem IVPB   IV Intermittent   meropenem IVPB 1000 milliGRAM(s) IV Intermittent every 8 hours  enoxaparin Injectable 40 milliGRAM(s) SubCutaneous daily  famotidine Injectable 20 milliGRAM(s) IV Push two times a day  multiple electrolytes Injection Type 1 1000 milliLiter(s) (75 mL/Hr) IV Continuous <Continuous>  dextrose 5%. 1000 milliLiter(s) (50 mL/Hr) IV Continuous <Continuous>  dextrose 50% Injectable 12.5 Gram(s) IV Push once  dextrose 50% Injectable 25 Gram(s) IV Push once  dextrose 50% Injectable 25 Gram(s) IV Push once  insulin lispro (HumaLOG) corrective regimen sliding scale   SubCutaneous three times a day before meals  insulin lispro (HumaLOG) corrective regimen sliding scale   SubCutaneous at bedtime    MEDICATIONS  (PRN):  dextrose Gel 1 Dose(s) Oral once PRN Blood Glucose LESS THAN 70 milliGRAM(s)/deciLiter  glucagon  Injectable 1 milliGRAM(s) IntraMuscular once PRN Glucose <70 milliGRAM(s)/deciLiter  oxyCODONE    5 mG/acetaminophen 325 mG 2 Tablet(s) Oral every 4 hours PRN Severe Pain (7 - 10)  oxyCODONE    5 mG/acetaminophen 325 mG 1 Tablet(s) Oral every 4 hours PRN Moderate Pain (4 - 6)      General: Alert and orientedx3, in no acute distress  Chest: right thoracotomy dressing with minimal serosanguinous drainage   Heart: S1, S2, irregularly irregular rate and rhythm   Lungs: clear to auscultation B/L, without wheezes, rhonci, rales - decreased at right lower  Abdomen: Soft, non distended, non tender, positive bowel sounds  Extremeties: without edema B/L LE, positive DP pulses B/L     Extubation within 24 hours: yes    Does the patient have a history of kidney disease: no  -give CKD stage if applicable:   -what is patient's baseline Creatinine: 0.59    Did the patient receive transfusion of blood and/or products: yes  -If yes, indication: anemia-acute blood loss     DVT PPX: SCD B/L, sub-cutaneous lovenox    Mallory-operative antibiotics discontinued within 48 hours of CTU admission: no  -remaining on antibiotics for infected pleural fluid      Patient care discussed on morning interdisciplinary rounds with CTS team.

## 2017-08-26 NOTE — PROVIDER CONTACT NOTE (CRITICAL VALUE NOTIFICATION) - ACTION/TREATMENT ORDERED:
continue antibiotics
NP Charles Smiley will re-order STAT CBC. Charles Smiley will come to room and assess patient.
continue abx, consult infectious disease

## 2017-08-26 NOTE — PROVIDER CONTACT NOTE (CRITICAL VALUE NOTIFICATION) - SITUATION
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
Patient's hematocrit and hemoglobin generated a critical value
BODY FLUID CULTURE - pleural fluid gram stain  positive polymorphonucleocytes, gram negative rods seen

## 2017-08-26 NOTE — AIRWAY REMOVAL NOTE  ADULT & PEDS - ARTIFICAL AIRWAY REMOVAL COMMENTS
written order of extubation verfied, the pt was identified by full name and birth date compared to the identification band, present during the procedure was RN and MD.

## 2017-08-26 NOTE — CONSULT NOTE ADULT - ATTENDING COMMENTS
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 f/u repeat cx  2. d/c vanco  3. cont meropenem

## 2017-08-26 NOTE — PROGRESS NOTE ADULT - ASSESSMENT
72 year old female with htn, fracture of Left arm, GERD, HLD admitted   for new onset  Afib with RVR/ PNA/ s/p thoracotomy for empyema     Cv stable post op  afib rate controlled  cont w cardizem gtt  IV abxp  DVT ppx

## 2017-08-26 NOTE — PROGRESS NOTE ADULT - SUBJECTIVE AND OBJECTIVE BOX
THORACIC SURGERY NOTE    Consulting attending: Placido  Patient seen and examined: 08-26-17 @ 09:32      No acute overnight events. Patient weaned from pressor support, no additional blood products infused since OR yesterday evening. Remains sedated on propofol, intubated and on ventilator (AC) with physiologic PEEP of 5 o/n - CTU team to attempt wean this morning.          MEDICATIONS  (STANDING):  atorvastatin 40 milliGRAM(s) Oral at bedtime  aspirin  chewable 81 milliGRAM(s) Oral daily  diltiazem Infusion 5 mG/Hr (5 mL/Hr) IV Continuous <Continuous>  docusate sodium 100 milliGRAM(s) Oral three times a day  vancomycin  IVPB 1000 milliGRAM(s) IV Intermittent every 12 hours  meropenem IVPB   IV Intermittent   meropenem IVPB 1000 milliGRAM(s) IV Intermittent every 8 hours  enoxaparin Injectable 40 milliGRAM(s) SubCutaneous daily  famotidine Injectable 20 milliGRAM(s) IV Push two times a day  multiple electrolytes Injection Type 1 1000 milliLiter(s) (75 mL/Hr) IV Continuous <Continuous>  propofol Infusion 19.569 MICROgram(s)/kG/Min (6 mL/Hr) IV Continuous <Continuous>    MEDICATIONS  (PRN):      VITALS & I/Os:  Vital Signs Last 24 Hrs  T(C): 36 (26 Aug 2017 11:00), Max: 37.2 (25 Aug 2017 12:26)  T(F): 96.8 (26 Aug 2017 11:00), Max: 99 (25 Aug 2017 12:26)  HR: 90 (26 Aug 2017 11:19) (63 - 131)  BP: 130/75 (25 Aug 2017 12:26) (130/75 - 130/75)  BP(mean): --  RR: 12 (26 Aug 2017 11:00) (12 - 25)  SpO2: 98% (26 Aug 2017 11:19) (98% - 100%)  CAPILLARY BLOOD GLUCOSE        Mode: CPAP with PS  FiO2: 40  PEEP: 5  PS: 5  MAP: 6  PIP: 15      I&O's Detail    25 Aug 2017 07:01  -  26 Aug 2017 07:00  --------------------------------------------------------  IN:    Albumin 5%  - 250 mL: 500 mL    diltiazem Infusion: 55 mL    IV PiggyBack: 500 mL    multiple electrolytes Injection Type 1: 300 mL    propofol Infusion: 55 mL  Total IN: 1410 mL    OUT:    Chest Tube: 170 mL    Chest Tube: 55 mL    Chest Tube: 45 mL    Indwelling Catheter - Urethral: 605 mL    Voided: 750 mL  Total OUT: 1625 mL    Total NET: -215 mL      26 Aug 2017 07:01  -  26 Aug 2017 11:36  --------------------------------------------------------  IN:    IV PiggyBack: 150 mL    multiple electrolytes Injection Type 1: 300 mL    propofol Infusion: 38.5 mL  Total IN: 488.5 mL    OUT:    Chest Tube: 20 mL    Chest Tube: 55 mL    Chest Tube: 10 mL    Indwelling Catheter - Urethral: 140 mL  Total OUT: 225 mL    Total NET: 263.5 mL            GEN: Intubated, sedated. Arousable - follows commands  HEENT: WNL  CHEST: R thoracotomy dressings clean, dry, intact. Chest tubes x 3 in place.  ABD: Soft, non-tender, non-distended  EXT/VASC: No edema/erythema or deformity. Warm, cap refill < 2 sec. Motor and sensory function intact.     LABS:                        11.4   28.5  )-----------( 344      ( 26 Aug 2017 01:52 )             32.9     08-26    140  |  102  |  5<L>  ----------------------------<  193<H>  4.1   |  22  |  0.41<L>    Ca    7.2<L>      26 Aug 2017 01:52    TPro  4.9<L>  /  Alb  2.2<L>  /  TBili  1.3<H>  /  DBili  x   /  AST  24  /  ALT  22  /  AlkPhos  69  08-26    Lactate: atorvastatin 40 milliGRAM(s) Oral at bedtime  aspirin  chewable 81 milliGRAM(s) Oral daily  diltiazem Infusion 5 mG/Hr IV Continuous <Continuous>  docusate sodium 100 milliGRAM(s) Oral three times a day  vancomycin  IVPB 1000 milliGRAM(s) IV Intermittent every 12 hours  meropenem IVPB   IV Intermittent   meropenem IVPB 1000 milliGRAM(s) IV Intermittent every 8 hours  enoxaparin Injectable 40 milliGRAM(s) SubCutaneous daily  famotidine Injectable 20 milliGRAM(s) IV Push two times a day  multiple electrolytes Injection Type 1 1000 milliLiter(s) IV Continuous <Continuous>  propofol Infusion 19.569 MICROgram(s)/kG/Min IV Continuous <Continuous>     PT/INR - ( 26 Aug 2017 01:52 )   PT: 15.8 sec;   INR: 1.45 ratio         PTT - ( 26 Aug 2017 01:52 )  PTT:31.7 sec  ABG - ( 26 Aug 2017 07:45 )  pH: 7.42  /  pCO2: 36    /  pO2: 145   / HCO3: 23    / Base Excess: -.5   /  SaO2: 100               ASSESSMENT & PLAN  72y F, s/p VATS to open thoracotomy for evacuation or R chest empyema and decortication. Continue antibiosis, f/u cx/sensitivity. Proceed with vent wean, as tolerated. Continue chest tubes to low-continuous wall suction. THORACIC SURGERY NOTE    Consulting attending: Placido  Patient seen and examined: 08-26-17 @ 09:32      No acute overnight events. Patient weaned from pressor support, no additional blood products infused since OR yesterday evening. Remains sedated on propofol, intubated and on ventilator (AC) with physiologic PEEP of 5 o/n - CTU team to attempt wean this morning.          MEDICATIONS  (STANDING):  atorvastatin 40 milliGRAM(s) Oral at bedtime  aspirin  chewable 81 milliGRAM(s) Oral daily  diltiazem Infusion 5 mG/Hr (5 mL/Hr) IV Continuous <Continuous>  docusate sodium 100 milliGRAM(s) Oral three times a day  vancomycin  IVPB 1000 milliGRAM(s) IV Intermittent every 12 hours  meropenem IVPB   IV Intermittent   meropenem IVPB 1000 milliGRAM(s) IV Intermittent every 8 hours  enoxaparin Injectable 40 milliGRAM(s) SubCutaneous daily  famotidine Injectable 20 milliGRAM(s) IV Push two times a day  multiple electrolytes Injection Type 1 1000 milliLiter(s) (75 mL/Hr) IV Continuous <Continuous>  propofol Infusion 19.569 MICROgram(s)/kG/Min (6 mL/Hr) IV Continuous <Continuous>    MEDICATIONS  (PRN):      VITALS & I/Os:  Vital Signs Last 24 Hrs  T(C): 36 (26 Aug 2017 11:00), Max: 37.2 (25 Aug 2017 12:26)  T(F): 96.8 (26 Aug 2017 11:00), Max: 99 (25 Aug 2017 12:26)  HR: 90 (26 Aug 2017 11:19) (63 - 131)  BP: 130/75 (25 Aug 2017 12:26) (130/75 - 130/75)  BP(mean): --  RR: 12 (26 Aug 2017 11:00) (12 - 25)  SpO2: 98% (26 Aug 2017 11:19) (98% - 100%)  CAPILLARY BLOOD GLUCOSE        Mode: CPAP with PS  FiO2: 40  PEEP: 5  PS: 5  MAP: 6  PIP: 15      I&O's Detail    25 Aug 2017 07:01  -  26 Aug 2017 07:00  --------------------------------------------------------  IN:    Albumin 5%  - 250 mL: 500 mL    diltiazem Infusion: 55 mL    IV PiggyBack: 500 mL    multiple electrolytes Injection Type 1: 300 mL    propofol Infusion: 55 mL  Total IN: 1410 mL    OUT:    Chest Tube: 170 mL    Chest Tube: 55 mL    Chest Tube: 45 mL    Indwelling Catheter - Urethral: 605 mL    Voided: 750 mL  Total OUT: 1625 mL    Total NET: -215 mL      26 Aug 2017 07:01  -  26 Aug 2017 11:36  --------------------------------------------------------  IN:    IV PiggyBack: 150 mL    multiple electrolytes Injection Type 1: 300 mL    propofol Infusion: 38.5 mL  Total IN: 488.5 mL    OUT:    Chest Tube: 20 mL    Chest Tube: 55 mL    Chest Tube: 10 mL    Indwelling Catheter - Urethral: 140 mL  Total OUT: 225 mL    Total NET: 263.5 mL            GEN: Intubated, sedated. Arousable - follows commands  HEENT: WNL  CHEST: R thoracotomy dressings clean, dry, intact. Chest tubes x 3 in place.  ABD: Soft, non-tender, non-distended  EXT/VASC: No edema/erythema or deformity. Warm, cap refill < 2 sec. Motor and sensory function intact.     LABS:                        11.4   28.5  )-----------( 344      ( 26 Aug 2017 01:52 )             32.9     08-26    140  |  102  |  5<L>  ----------------------------<  193<H>  4.1   |  22  |  0.41<L>    Ca    7.2<L>      26 Aug 2017 01:52    TPro  4.9<L>  /  Alb  2.2<L>  /  TBili  1.3<H>  /  DBili  x   /  AST  24  /  ALT  22  /  AlkPhos  69  08-26    Lactate: atorvastatin 40 milliGRAM(s) Oral at bedtime  aspirin  chewable 81 milliGRAM(s) Oral daily  diltiazem Infusion 5 mG/Hr IV Continuous <Continuous>  docusate sodium 100 milliGRAM(s) Oral three times a day  vancomycin  IVPB 1000 milliGRAM(s) IV Intermittent every 12 hours  meropenem IVPB   IV Intermittent   meropenem IVPB 1000 milliGRAM(s) IV Intermittent every 8 hours  enoxaparin Injectable 40 milliGRAM(s) SubCutaneous daily  famotidine Injectable 20 milliGRAM(s) IV Push two times a day  multiple electrolytes Injection Type 1 1000 milliLiter(s) IV Continuous <Continuous>  propofol Infusion 19.569 MICROgram(s)/kG/Min IV Continuous <Continuous>     PT/INR - ( 26 Aug 2017 01:52 )   PT: 15.8 sec;   INR: 1.45 ratio         PTT - ( 26 Aug 2017 01:52 )  PTT:31.7 sec  ABG - ( 26 Aug 2017 07:45 )  pH: 7.42  /  pCO2: 36    /  pO2: 145   / HCO3: 23    / Base Excess: -.5   /  SaO2: 100         IMAGING:  CXR - Chest tubes in place, R lung expanded. Pulmonary edema, small PTX overlying R diaphragm, no effusion, no air below diaphragm.      ASSESSMENT & PLAN  72y F, s/p VATS to open thoracotomy for evacuation or R chest empyema and decortication. Continue antibiosis, f/u cx/sensitivity. Proceed with vent wean, as tolerated. Continue chest tubes to low-continuous wall suction.

## 2017-08-27 DIAGNOSIS — I48.91 UNSPECIFIED ATRIAL FIBRILLATION: ICD-10-CM

## 2017-08-27 DIAGNOSIS — J86.9 PYOTHORAX WITHOUT FISTULA: ICD-10-CM

## 2017-08-27 LAB
ALBUMIN SERPL ELPH-MCNC: 2.5 G/DL — LOW (ref 3.3–5)
ALP SERPL-CCNC: 62 U/L — SIGNIFICANT CHANGE UP (ref 40–120)
ALT FLD-CCNC: 18 U/L RC — SIGNIFICANT CHANGE UP (ref 10–45)
ANION GAP SERPL CALC-SCNC: 9 MMOL/L — SIGNIFICANT CHANGE UP (ref 5–17)
APTT BLD: 28.8 SEC — SIGNIFICANT CHANGE UP (ref 27.5–37.4)
APTT BLD: 29.5 SEC — SIGNIFICANT CHANGE UP (ref 27.5–37.4)
AST SERPL-CCNC: 22 U/L — SIGNIFICANT CHANGE UP (ref 10–40)
BILIRUB SERPL-MCNC: 0.4 MG/DL — SIGNIFICANT CHANGE UP (ref 0.2–1.2)
BUN SERPL-MCNC: 9 MG/DL — SIGNIFICANT CHANGE UP (ref 7–23)
CALCIUM SERPL-MCNC: 8 MG/DL — LOW (ref 8.4–10.5)
CHLORIDE SERPL-SCNC: 104 MMOL/L — SIGNIFICANT CHANGE UP (ref 96–108)
CO2 SERPL-SCNC: 26 MMOL/L — SIGNIFICANT CHANGE UP (ref 22–31)
CREAT SERPL-MCNC: 0.38 MG/DL — LOW (ref 0.5–1.3)
CULTURE RESULTS: SIGNIFICANT CHANGE UP
GAS PNL BLDV: SIGNIFICANT CHANGE UP
GLUCOSE SERPL-MCNC: 133 MG/DL — HIGH (ref 70–99)
HCT VFR BLD CALC: 27 % — LOW (ref 34.5–45)
HCT VFR BLD CALC: 28.4 % — LOW (ref 34.5–45)
HGB BLD-MCNC: 8.8 G/DL — LOW (ref 11.5–15.5)
HGB BLD-MCNC: 9.5 G/DL — LOW (ref 11.5–15.5)
INR BLD: 1.33 RATIO — HIGH (ref 0.88–1.16)
MCHC RBC-ENTMCNC: 30.6 PG — SIGNIFICANT CHANGE UP (ref 27–34)
MCHC RBC-ENTMCNC: 31.1 PG — SIGNIFICANT CHANGE UP (ref 27–34)
MCHC RBC-ENTMCNC: 32.8 GM/DL — SIGNIFICANT CHANGE UP (ref 32–36)
MCHC RBC-ENTMCNC: 33.4 GM/DL — SIGNIFICANT CHANGE UP (ref 32–36)
MCV RBC AUTO: 93 FL — SIGNIFICANT CHANGE UP (ref 80–100)
MCV RBC AUTO: 93.5 FL — SIGNIFICANT CHANGE UP (ref 80–100)
PHOSPHATE SERPL-MCNC: 1.9 MG/DL — LOW (ref 2.5–4.5)
PLATELET # BLD AUTO: 314 K/UL — SIGNIFICANT CHANGE UP (ref 150–400)
PLATELET # BLD AUTO: 364 K/UL — SIGNIFICANT CHANGE UP (ref 150–400)
POTASSIUM BLDV-SCNC: 3.1 MMOL/L — LOW (ref 3.5–5)
POTASSIUM BLDV-SCNC: 4 MMOL/L — SIGNIFICANT CHANGE UP (ref 3.5–5)
POTASSIUM SERPL-MCNC: 3.9 MMOL/L — SIGNIFICANT CHANGE UP (ref 3.5–5.3)
POTASSIUM SERPL-SCNC: 3.9 MMOL/L — SIGNIFICANT CHANGE UP (ref 3.5–5.3)
PROT SERPL-MCNC: 5.1 G/DL — LOW (ref 6–8.3)
PROTHROM AB SERPL-ACNC: 14.6 SEC — HIGH (ref 9.8–12.7)
RBC # BLD: 2.89 M/UL — LOW (ref 3.8–5.2)
RBC # BLD: 3.05 M/UL — LOW (ref 3.8–5.2)
RBC # FLD: 12.6 % — SIGNIFICANT CHANGE UP (ref 10.3–14.5)
RBC # FLD: 12.9 % — SIGNIFICANT CHANGE UP (ref 10.3–14.5)
SODIUM SERPL-SCNC: 139 MMOL/L — SIGNIFICANT CHANGE UP (ref 135–145)
SPECIMEN SOURCE: SIGNIFICANT CHANGE UP
WBC # BLD: 20.5 K/UL — HIGH (ref 3.8–10.5)
WBC # BLD: 22.2 K/UL — HIGH (ref 3.8–10.5)
WBC # FLD AUTO: 20.5 K/UL — HIGH (ref 3.8–10.5)
WBC # FLD AUTO: 22.2 K/UL — HIGH (ref 3.8–10.5)

## 2017-08-27 PROCEDURE — 71010: CPT | Mod: 26

## 2017-08-27 PROCEDURE — 99291 CRITICAL CARE FIRST HOUR: CPT

## 2017-08-27 PROCEDURE — 99232 SBSQ HOSP IP/OBS MODERATE 35: CPT

## 2017-08-27 PROCEDURE — 71010: CPT | Mod: 26,77

## 2017-08-27 RX ORDER — HEPARIN SODIUM 5000 [USP'U]/ML
500 INJECTION INTRAVENOUS; SUBCUTANEOUS
Qty: 25000 | Refills: 0 | Status: DISCONTINUED | OUTPATIENT
Start: 2017-08-27 | End: 2017-08-27

## 2017-08-27 RX ORDER — DILTIAZEM HCL 120 MG
10 CAPSULE, EXT RELEASE 24 HR ORAL
Qty: 125 | Refills: 0 | Status: DISCONTINUED | OUTPATIENT
Start: 2017-08-27 | End: 2017-08-28

## 2017-08-27 RX ORDER — HEPARIN SODIUM 5000 [USP'U]/ML
600 INJECTION INTRAVENOUS; SUBCUTANEOUS
Qty: 25000 | Refills: 0 | Status: DISCONTINUED | OUTPATIENT
Start: 2017-08-27 | End: 2017-08-28

## 2017-08-27 RX ORDER — POTASSIUM CHLORIDE 20 MEQ
10 PACKET (EA) ORAL
Qty: 0 | Refills: 0 | Status: COMPLETED | OUTPATIENT
Start: 2017-08-27 | End: 2017-08-27

## 2017-08-27 RX ADMIN — OXYCODONE AND ACETAMINOPHEN 2 TABLET(S): 5; 325 TABLET ORAL at 02:45

## 2017-08-27 RX ADMIN — MEROPENEM 200 MILLIGRAM(S): 1 INJECTION INTRAVENOUS at 21:46

## 2017-08-27 RX ADMIN — MEROPENEM 200 MILLIGRAM(S): 1 INJECTION INTRAVENOUS at 05:35

## 2017-08-27 RX ADMIN — OXYCODONE AND ACETAMINOPHEN 2 TABLET(S): 5; 325 TABLET ORAL at 03:30

## 2017-08-27 RX ADMIN — HEPARIN SODIUM 6 UNIT(S)/HR: 5000 INJECTION INTRAVENOUS; SUBCUTANEOUS at 19:28

## 2017-08-27 RX ADMIN — MEROPENEM 200 MILLIGRAM(S): 1 INJECTION INTRAVENOUS at 13:20

## 2017-08-27 RX ADMIN — Medication 50 MILLIEQUIVALENT(S): at 21:19

## 2017-08-27 RX ADMIN — Medication 1: at 13:16

## 2017-08-27 RX ADMIN — Medication 81 MILLIGRAM(S): at 13:16

## 2017-08-27 RX ADMIN — Medication 63.75 MILLIMOLE(S): at 05:35

## 2017-08-27 RX ADMIN — OXYCODONE AND ACETAMINOPHEN 2 TABLET(S): 5; 325 TABLET ORAL at 20:41

## 2017-08-27 RX ADMIN — Medication 100 MILLIGRAM(S): at 13:15

## 2017-08-27 RX ADMIN — Medication 5 MG/HR: at 05:36

## 2017-08-27 RX ADMIN — Medication 50 MILLIEQUIVALENT(S): at 20:50

## 2017-08-27 RX ADMIN — OXYCODONE AND ACETAMINOPHEN 2 TABLET(S): 5; 325 TABLET ORAL at 17:05

## 2017-08-27 RX ADMIN — OXYCODONE AND ACETAMINOPHEN 2 TABLET(S): 5; 325 TABLET ORAL at 09:34

## 2017-08-27 RX ADMIN — OXYCODONE AND ACETAMINOPHEN 2 TABLET(S): 5; 325 TABLET ORAL at 16:25

## 2017-08-27 RX ADMIN — Medication 50 MILLIEQUIVALENT(S): at 21:46

## 2017-08-27 RX ADMIN — Medication 100 MILLIGRAM(S): at 21:46

## 2017-08-27 RX ADMIN — OXYCODONE AND ACETAMINOPHEN 2 TABLET(S): 5; 325 TABLET ORAL at 21:19

## 2017-08-27 RX ADMIN — OXYCODONE AND ACETAMINOPHEN 2 TABLET(S): 5; 325 TABLET ORAL at 09:04

## 2017-08-27 RX ADMIN — Medication 100 MILLIGRAM(S): at 05:35

## 2017-08-27 RX ADMIN — FAMOTIDINE 20 MILLIGRAM(S): 10 INJECTION INTRAVENOUS at 17:58

## 2017-08-27 RX ADMIN — FAMOTIDINE 20 MILLIGRAM(S): 10 INJECTION INTRAVENOUS at 05:35

## 2017-08-27 RX ADMIN — HEPARIN SODIUM 5 UNIT(S)/HR: 5000 INJECTION INTRAVENOUS; SUBCUTANEOUS at 12:05

## 2017-08-27 RX ADMIN — ATORVASTATIN CALCIUM 40 MILLIGRAM(S): 80 TABLET, FILM COATED ORAL at 21:46

## 2017-08-27 NOTE — PROGRESS NOTE ADULT - SUBJECTIVE AND OBJECTIVE BOX
YAYA CAZARES  MRN-[id number]    Admission HPI:  72 year old female with hx of HTN, HLD, gerd, insomnia s/p mechanical fall in 5/2017 with left humeral fracture sent from preop testing for Afib with RVR. Pt scheduled to undergo ORIF of left upper extremity tomorrow, now admitted for workup of Afib/rvr/sepsis. Denies chest pain, palpitations, shortness of breath, abdominal pain, fevers, chills, cough, dysuria, hematuria, melena, hematochezia. No headaches, dizziness, LOC. No sick contacts or travel history. Chronic smoking history over 50 pack years, quit in May 2017, never had CT scan. Reports decreased appetite and weight loss of 5-10lbs in the past month. Lives at home with family, ambulates without cane/ walker, ambulation has been limited since the fall/farcture. (22 Aug 2017 20:06)      8/25 thoracotomy /empema drainage  extubated yesterday  breathing is better; no sob, mild cough      ROS:  General: No fever;No chills;  CNS:No Headace  CVS:+ chest pain R sided ( thoracotomy and chest tube s) No orthopnea;  RES:+cough; + sputum;No shortness of breath;  GI:No abdominal pain;No N/V/D  :No Dysuria;  MSK:No back pain;  Skin:No rash; No itching  PSY:No Anxiety;    PAST MEDICAL & SURGICAL HISTORY:  Displaced spiral fracture of shaft of humerus, left arm, subsequent encounter for fracture with routine healing  Insomnia  GERD (gastroesophageal reflux disease)  HLD (hyperlipidemia)  HTN (hypertension)    Allergies    No Known Allergies    Intolerances        PHYSICAL EXAM:  Vital Signs Last 24 Hrs  T(C): 36.7 (27 Aug 2017 03:00), Max: 36.7 (27 Aug 2017 03:00)  T(F): 98 (27 Aug 2017 03:00), Max: 98 (27 Aug 2017 03:00)  HR: 87 (27 Aug 2017 09:00) (70 - 123)  BP: 116/56 (27 Aug 2017 09:00) (101/50 - 157/65)  BP(mean): 81 (27 Aug 2017 09:00) (71 - 93)  RR: 22 (27 Aug 2017 09:00) (9 - 30)  SpO2: 96% (27 Aug 2017 09:00) (91% - 100%)  Gen:  Awake, alert, not in distress  CNS:A&O x3, No focal dificit  Eyes:PERRL  ENT/Neck: no JVD	  RES :+Breath sounds , No rhonchi, Nowheezing  CVS: regular rhythm. Normal S1/S2. No Murmur;   Abd: not distented;Soft; No Tenderness. Bowel sounds present .   Extremities:No edema; L arm in stabilizere  Skin: No rash  Psych: No lethargy .    Lines/tubes:   TLC :RIJ TLC 8/25 OR			  Urinary Catheter:OR 8/25    Labs:                        8.8    20.5  )-----------( 314      ( 27 Aug 2017 01:58 )             27.0     08-27    139  |  104  |  9   ----------------------------<  133<H>  3.9   |  26  |  0.38<L>    Ca    8.0<L>      27 Aug 2017 01:58  Phos  1.9     08-27    TPro  5.1<L>  /  Alb  2.5<L>  /  TBili  0.4  /  DBili  x   /  AST  22  /  ALT  18  /  AlkPhos  62  08-27    LIVER FUNCTIONS - ( 27 Aug 2017 01:58 )  Alb: 2.5 g/dL / Pro: 5.1 g/dL / ALK PHOS: 62 U/L / ALT: 18 U/L RC / AST: 22 U/L / GGT: x           PT/INR - ( 27 Aug 2017 01:58 )   PT: 14.6 sec;   INR: 1.33 ratio         PTT - ( 27 Aug 2017 01:58 )  PTT:29.5 sec  ABG - ( 26 Aug 2017 12:24 )  pH: 7.43  /  pCO2: 38    /  pO2: 99    / HCO3: 25    / Base Excess: 1.2   /  SaO2: 98        Glucose:  CAPILLARY BLOOD GLUCOSE  141 (27 Aug 2017 08:00)  128 (26 Aug 2017 22:00)  125 (26 Aug 2017 17:30)    =Micro:    =CXR:    =TTE:    ASSESMENT :  s/p R thoracotomy  R Pleural Empyema; fusobacterium   A fib newly diagnosed  anemia  L Humerus fracture    PLAN:     =Neuro:analgesics   oxyCODONE    5 mG/acetaminophen 325 mG PRN  oxyCODONE    5 mG/acetaminophen 325 mG PRN    = Respiratory: Brocnhodilators, inhaled steroid, iv steroids,respiratory support    A/Mask 40%      =CVS/Hem:  diltiazem Infusion 10 mg /h <Continuous>  started diltiazem    Tablet 30 every 8 hours    aspirin  chewable 81 daily  enoxaparin Injectable 40 daily    =Renal:     Ceja:  continue to  Monitor I/Os   =GI:  docusate sodium 100 three times a day  famotidine Injectable 20 two times a day  multiple electrolytes Injection Type 1 1000 <Continuous>  dextrose 5%. 1000 <Continuous>    =Endo: Glycemic control; F/S with coverage;statin  atorvastatin 40 at bedtime  dextrose Gel 1 once PRN  dextrose 50% Injectable 12.5 once  dextrose 50% Injectable 25 once  dextrose 50% Injectable 25 once  glucagon  Injectable 1 once PRN  insulin lispro (HumaLOG) corrective regimen sliding scale  three times a day before meals  insulin lispro (HumaLOG) corrective regimen sliding scale  at bedtime    =Antibiotics:   meropenem IVPB 1000 every 8 hours      =skin & Others :      Pertinent clinical, laboratory, radiographic, hemodynamic, echocardiographic, respiratory data, microbiologic data and chart were reviewed and analyzed frequently .   GI and DVT prophylaxis, glycemic control, head of bed elevation and skin care issues were addressed.  Patient seen, examined and plan discussed with CT U team.    patient remains critical;  I spent ? minutes of critical care time, noncontiuous, with this patient YAYA CAZARES  [id number]      72 year old female with hx of HTN, HLD, gerd, insomnia s/p mechanical fall in 5/2017 with left humeral fracture sent from preop testing for Afib with RVR.   work up showed loculated pleural effusion; thoracentesis is growing fusobacterium.  8/25 thoracotomy /empyma drainage  extubated yesterday; breathing is better; no sob, +mild cough; no sputum production.+ incisional pain, no abdominal pain or N/V/D.  pt with atrial fibrillation , RVR required cardizem drip.  denies         PAST MEDICAL & SURGICAL HISTORY:  Displaced spiral fracture of shaft of humerus, left arm, subsequent encounter for fracture with routine healing  Insomnia  GERD (gastroesophageal reflux disease)  HLD (hyperlipidemia)  HTN (hypertension)    Allergies No Known Allergies    PHYSICAL EXAM:  Vital Signs Last 24 Hrs  T(C): 36.7 (27 Aug 2017 03:00), Max: 36.7 (27 Aug 2017 03:00)  T(F): 98 (27 Aug 2017 03:00), Max: 98 (27 Aug 2017 03:00)  HR: 87 (27 Aug 2017 09:00) (70 - 123)  BP: 116/56 (27 Aug 2017 09:00) (101/50 - 157/65)  BP(mean): 81 (27 Aug 2017 09:00) (71 - 93)  RR: 22 (27 Aug 2017 09:00) (9 - 30)  SpO2: 96% (27 Aug 2017 09:00) (91% - 100%)    Gen:  Awake, alert, not in distress  CNS: A&O x3, No focal dificit ( Larm Fx)  Eyes:PERRL  ENT/Neck: no JVD	  RES :+Breath sounds , No rhonchi, Nowheezing  Chest:R thoracotomy incision, with 3 chest tubes.  CVS: irregular rhythm. Normal S1/S2. No Murmur;   Abd: not distented;Soft; No Tenderness. Bowel sounds present .   Extremities:No edema; L arm in stabilizere  Skin: No rash  Psych: No lethargy .    Lines/tubes:   TLC :RIJ TLC 8/25 OR			  Urinary Catheter:OR 8/25    Labs:                        8.8    20.5  )-----------( 314      ( 27 Aug 2017 01:58 )             27.0     08-27    139  |  104  |  9   ----------------------------<  133<H>  3.9   |  26  |  0.38<L>    Ca    8.0<L>      27 Aug 2017 01:58  Phos  1.9     08-27    TPro  5.1<L>  /  Alb  2.5<L>  /  TBili  0.4  /  DBili  x   /  AST  22  /  ALT  18  /  AlkPhos  62  08-27    LIVER FUNCTIONS - ( 27 Aug 2017 01:58 )  Alb: 2.5 g/dL / Pro: 5.1 g/dL / ALK PHOS: 62 U/L / ALT: 18 U/L RC / AST: 22 U/L / GGT: x           PT/INR - ( 27 Aug 2017 01:58 )   PT: 14.6 sec;   INR: 1.33 ratio         PTT - ( 27 Aug 2017 01:58 )  PTT:29.5 sec  ABG - ( 26 Aug 2017 12:24 )  pH: 7.43  /  pCO2: 38    /  pO2: 99    / HCO3: 25    / Base Excess: 1.2   /  SaO2: 98        Glucose:  CAPILLARY BLOOD GLUCOSE  141 (27 Aug 2017 08:00)  128 (26 Aug 2017 22:00)  125 (26 Aug 2017 17:30)    =Micro:  Culture - Pleural Body Fluid with Gram Stain (08.24.17 @ 13:05)  Moderate Fusobacterium nucleatum  Culture - Blood (08.26.17 @ 05:49)  No growth to date.    =CXR:  cxr 8/27 R lower lung field hazeniness    =TTE:  < from: Transthoracic Echocardiogram w/ Doppler (04.17.09 @ 20:56) >  Conclusions:  1. Mild-moderate mitral regurgitation.  2. No  aortic valve regurgitation seen.  3. Normal aortic root. There is flow acceleration seen in  the aortic arch, the exact location of possible stenosis is  not know, vascular Doppler study may be helpful  4. Normal left ventricular systolic function. No Segmental  wall motion abnormalities EF = 75%  5. Normal right ventricular size and systolic function.  TAPSE = 20mm  6. Mild tricuspid regurgitation.  ------------------------------------------------------------------------  Confirmed on  4/17/2009 - 16:19:33 by Rasheeda Serrano M.D.  ------------------------------------------------------------------------    < end of copied text >

## 2017-08-27 NOTE — PROGRESS NOTE ADULT - ASSESSMENT
72 year old female with htn, fracture of Left arm, GERD, HLD admitted   for new onset  Afib with RVR/ PNA/ s/p thoracotomy for empyema     Cv stable post op  cont w transition to oral cardizem   IV abx  DVT ppx 72 year old female with htn, fracture of Left arm, GERD, HLD admitted   for new onset  Afib with RVR/ PNA/ s/p thoracotomy for empyema     Cv stable post op  cont w transition to oral cardizem   IV abx  AC when cleared by surgery  DVT ppx

## 2017-08-27 NOTE — PROGRESS NOTE ADULT - ASSESSMENT
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 continue meropenem  2. trend WBC  3. will ultimately get PICC and prolonged abx    Pager [phone].  After 5pm or weekends call ID office at [phone].

## 2017-08-27 NOTE — PROGRESS NOTE ADULT - ASSESSMENT
73 y/o female with PMhx of HTN, HLD, gerd, insomnia s/p mechanical fall in 5/2017 with left humeral fracture sent from preop testing for Afib with RVR. Pt scheduled to undergo ORIF of left upper extremity tomorrow, now admitted for workup of Afib/rvr/sepsis. Pt s/p 8/24/17 right VATS to open thoracotomy for evacuation or R chest empyema and decortication. 71 y/o female with PMhx of HTN, HLD, gerd, insomnia s/p mechanical fall in 5/2017 with left humeral fracture sent from preop testing for Afib with RVR on 8/22. Pt scheduled to undergo ORIF of left upper extremity tomorrow, now admitted for workup of Afib/rvr/sepsis. 8/23 CTA Chest w/IV cont-Large multiloculated right pleural effusion with pleural thickening, concerning for possible infectious or neoplastic etiology. Compressive partial atelectasis of the right lower and middle lobes. Pt s/p 8/24/17 right VATS to open thoracotomy for evacuation or R chest empyema and decortication.

## 2017-08-27 NOTE — PROGRESS NOTE ADULT - PROBLEM SELECTOR PLAN 1
Continue current antibiotics, f/u cx/sensitivity. ID following.  Continue chest tubes to low-continuous wall suction. Daily CXR.  Cough and deep breathe, Incentive Spirometry Q1h, Chest PT.   Ambulate 4x daily as tolerated and with PT.   Continue care per primary team.

## 2017-08-27 NOTE — PROGRESS NOTE ADULT - SUBJECTIVE AND OBJECTIVE BOX
Subjective: Pt states "I have some pain on my right side" denies any CP, SOB, N/V and palpitations. No acute events overnight.     Vital Signs Last 24 Hrs  T(F): 98, Max: 98   HR: 85   BP: 111/51   RR: 14   SpO2: 98%          17 @ 07:01  -  17 @ 13:25  --------------------------------------------------------  IN: 265 mL / OUT: 60 mL / NET: 205 mL         Daily Weight in k.6 (27 Aug 2017 00:45)                        8.8    20.5  )-----------( 314      ( 27 Aug 2017 01:58 )             27.0     139  |  104  |  9   ----------------------------<  133<H>  3.9   |  26  |  0.38<L>          PHYSICAL EXAM  Neurology: A&Ox3, NAD, no gross deficits  CV : Irregular +S1S2  Lungs: Respirations non-labored, B/L BS clear with crackles on right. R thoracotomy incision DSD CDI.             Right CT #1 posterior 235ml/24h serosanguinous             Right CT #2 diaphragm 50ml/24h serousanguinous             Right CT #3 anterior 50ml/24h  serosanguinous +small air leak  Abdomen: Soft, NT/ND, +[id number]  Extremities: B/L LE no edema, negative calf tenderness, +PP              MEDICATIONS  atorvastatin 40 milliGRAM(s) Oral at bedtime  aspirin  chewable 81 milliGRAM(s) Oral daily  diltiazem Infusion 5 mG/Hr IV Continuous <Continuous>  docusate sodium 100 milliGRAM(s) Oral three times a day  meropenem IVPB   IV Intermittent   meropenem IVPB 1000 milliGRAM(s) IV Intermittent every 8 hours  famotidine Injectable 20 milliGRAM(s) IV Push two times a day  multiple electrolytes Injection Type 1 1000 milliLiter(s) IV Continuous <Continuous>  insulin lispro (HumaLOG) corrective regimen sliding scale   SubCutaneous three times a day before meals  insulin lispro (HumaLOG) corrective regimen sliding scale   SubCutaneous at bedtime  oxyCODONE    5 mG/acetaminophen 325 mG 2 Tablet(s) Oral every 4 hours PRN  oxyCODONE    5 mG/acetaminophen 325 mG 1 Tablet(s) Oral every 4 hours PRN  diltiazem    Tablet 30 milliGRAM(s) Oral every 8 hours  heparin  Infusion 500 Unit(s)/Hr IV Continuous <Continuous>      Pt seen and examined with thoracic surgery attending, Dr. Smiley.

## 2017-08-27 NOTE — PROGRESS NOTE ADULT - ASSESSMENT
72 yrs old female with left humerous fracture S/p Fall , Empyema on Rt new onset A fib on Cardizem po, Stable   GI cont pepcid  cont  ASA  Dvt prophiolaxis with lovenox  pain manage ment with Percocet

## 2017-08-27 NOTE — PROGRESS NOTE ADULT - SUBJECTIVE AND OBJECTIVE BOX
Operation; Rt thoracotomy  POD    Patient is doing well, resting comfortably; complains of mild incisional pain that is relieved by pain medication.    Vital Signs Last 24 Hrs  T(C): 36.7 (27 Aug 2017 03:00), Max: 36.7 (27 Aug 2017 03:00)  T(F): 98 (27 Aug 2017 03:00), Max: 98 (27 Aug 2017 03:00)  HR: 91 (27 Aug 2017 06:00) (63 - 123)  BP: 107/50 (27 Aug 2017 06:00) (101/60 - 157/65)  BP(mean): 71 (27 Aug 2017 06:00) (71 - 93)  RR: 30 (27 Aug 2017 06:00) (9 - 30)  SpO2: 98% (27 Aug 2017 06:00) (91% - 100%)  I&O's Detail    25 Aug 2017 07:01  -  26 Aug 2017 07:00  --------------------------------------------------------  IN:    Albumin 5%  - 250 mL: 500 mL    diltiazem Infusion: 55 mL    IV PiggyBack: 500 mL    multiple electrolytes Injection Type 1: 300 mL    propofol Infusion: 55 mL  Total IN: 1410 mL    OUT:    Chest Tube: 170 mL    Chest Tube: 55 mL    Chest Tube: 45 mL    Indwelling Catheter - Urethral: 605 mL    Voided: 750 mL  Total OUT: 1625 mL    Total NET: -215 mL      26 Aug 2017 07:01  -  27 Aug 2017 06:21  --------------------------------------------------------  IN:    diltiazem Infusion: 165 mL    IV PiggyBack: 700 mL    multiple electrolytes Injection Type 1: 1500 mL    Oral Fluid: 240 mL    propofol Infusion: 38.5 mL  Total IN: 2643.5 mL    OUT:    Chest Tube: 50 mL    Chest Tube: 235 mL    Chest Tube: 50 mL    Indwelling Catheter - Urethral: 805 mL  Total OUT: 1140 mL    Total NET: 1503.5 mL          CHEST TUBE:  3 Rt Chest tubes to Suction # 3 With  DAVIDA DRAIN:    EPICARDIAL WIRES: Ventricular wires   PEREZ: Yes/No.    MEDICATIONS  (STANDING):  atorvastatin 40 milliGRAM(s) Oral at bedtime  aspirin  chewable 81 milliGRAM(s) Oral daily  diltiazem Infusion 5 mG/Hr (5 mL/Hr) IV Continuous <Continuous>  docusate sodium 100 milliGRAM(s) Oral three times a day  meropenem IVPB   IV Intermittent   meropenem IVPB 1000 milliGRAM(s) IV Intermittent every 8 hours  enoxaparin Injectable 40 milliGRAM(s) SubCutaneous daily  famotidine Injectable 20 milliGRAM(s) IV Push two times a day  multiple electrolytes Injection Type 1 1000 milliLiter(s) (75 mL/Hr) IV Continuous <Continuous>  dextrose 5%. 1000 milliLiter(s) (50 mL/Hr) IV Continuous <Continuous>  dextrose 50% Injectable 12.5 Gram(s) IV Push once  dextrose 50% Injectable 25 Gram(s) IV Push once  dextrose 50% Injectable 25 Gram(s) IV Push once  insulin lispro (HumaLOG) corrective regimen sliding scale   SubCutaneous three times a day before meals  insulin lispro (HumaLOG) corrective regimen sliding scale   SubCutaneous at bedtime  diltiazem    Tablet 30 milliGRAM(s) Oral every 8 hours    MEDICATIONS  (PRN):  dextrose Gel 1 Dose(s) Oral once PRN Blood Glucose LESS THAN 70 milliGRAM(s)/deciLiter  glucagon  Injectable 1 milliGRAM(s) IntraMuscular once PRN Glucose <70 milliGRAM(s)/deciLiter  oxyCODONE    5 mG/acetaminophen 325 mG 2 Tablet(s) Oral every 4 hours PRN Severe Pain (7 - 10)  oxyCODONE    5 mG/acetaminophen 325 mG 1 Tablet(s) Oral every 4 hours PRN Moderate Pain (4 - 6)      General: A+Ox3, in NAD  Chest: sternal dressing C/D/I  Heart: S1, S2, RRR  Lungs: CTA B/L, without W/R/R  Abdomen: Soft, ND, NT, positive BS  Extremeties: without edema B/L LE, positive DP pulses B/L     Does the patient have a history of CHF?   -If yes, systolic or diastolic  -pre-operative EF : unknown    Extubation within 24 hours? yes    CXR reviewed with attending.     Does the patient have a history of kidney disease? ni  -give CKD stage if applicable  -what is patient's baseline Creatinine 0.38    Beta Blockers contraindicated for the first 24 hours due to vasopressor/inotropic medication      Did the patient receive transfusion of blood and/or products?  -If yes, indication    DVT PPX with vendodynes as well as chemical prophylaxis.    Mallory-operative antibiotics to be discontinued within 48 hours of CTU admission    Patient care discussed on morning interdisciplinary rounds with CTS team.

## 2017-08-27 NOTE — PROGRESS NOTE ADULT - SUBJECTIVE AND OBJECTIVE BOX
CC: breathing improved, CP at CT site      PHYSICAL EXAM:    T(C): 36.7 (08-27-17 @ 03:00), Max: 36.7 (08-27-17 @ 03:00)  HR: 73 (08-27-17 @ 08:15) (63 - 123)  BP: 111/54 (08-27-17 @ 08:15) (101/50 - 157/65)  RR: 23 (08-27-17 @ 08:15) (9 - 30)  SpO2: 97% (08-27-17 @ 08:15) (91% - 100%)  Wt(kg): --  I&O's Summary    26 Aug 2017 07:01  -  27 Aug 2017 07:00  --------------------------------------------------------  IN: 2658.5 mL / OUT: 1180 mL / NET: 1478.5 mL    27 Aug 2017 07:01  -  27 Aug 2017 08:26  --------------------------------------------------------  IN: 15 mL / OUT: 30 mL / NET: -15 mL        Appearance: Normal	  Cardiovascular: Normal S1 S2,RRR, No JVD, No murmurs  Respiratory: dec BS athe bases	  Gastrointestinal:  Soft, Non-tender, + BS	  Extremities: Normal range of motion, No clubbing, cyanosis or edema  Vascular: Peripheral pulses palpable 2+ bilaterally     LABS:	 	                          8.8    20.5  )-----------( 314      ( 27 Aug 2017 01:58 )             27.0     08-27    139  |  104  |  9   ----------------------------<  133<H>  3.9   |  26  |  0.38<L>    Ca    8.0<L>      27 Aug 2017 01:58  Phos  1.9     08-27    TPro  5.1<L>  /  Alb  2.5<L>  /  TBili  0.4  /  DBili  x   /  AST  22  /  ALT  18  /  AlkPhos  62  08-27      PT/INR - ( 27 Aug 2017 01:58 )   PT: 14.6 sec;   INR: 1.33 ratio         PTT - ( 27 Aug 2017 01:58 )  PTT:29.5 sec    CARDIAC MARKERS:

## 2017-08-27 NOTE — PROGRESS NOTE ADULT - SUBJECTIVE AND OBJECTIVE BOX
Follow Up:      Interval History/ROS:Patient is a 72y old  Female who presents with a chief complaint of Afib (22 Aug 2017 20:06)      Allergies    No Known Allergies    Intolerances        ANTIMICROBIALS:  meropenem IVPB    meropenem IVPB 1000 every 8 hours      OTHER MEDS:  atorvastatin 40 milliGRAM(s) Oral at bedtime  aspirin  chewable 81 milliGRAM(s) Oral daily  diltiazem Infusion 5 mG/Hr IV Continuous <Continuous>  docusate sodium 100 milliGRAM(s) Oral three times a day  enoxaparin Injectable 40 milliGRAM(s) SubCutaneous daily  famotidine Injectable 20 milliGRAM(s) IV Push two times a day  multiple electrolytes Injection Type 1 1000 milliLiter(s) IV Continuous <Continuous>  dextrose 5%. 1000 milliLiter(s) IV Continuous <Continuous>  dextrose Gel 1 Dose(s) Oral once PRN  dextrose 50% Injectable 12.5 Gram(s) IV Push once  dextrose 50% Injectable 25 Gram(s) IV Push once  dextrose 50% Injectable 25 Gram(s) IV Push once  glucagon  Injectable 1 milliGRAM(s) IntraMuscular once PRN  insulin lispro (HumaLOG) corrective regimen sliding scale   SubCutaneous three times a day before meals  insulin lispro (HumaLOG) corrective regimen sliding scale   SubCutaneous at bedtime  oxyCODONE    5 mG/acetaminophen 325 mG 2 Tablet(s) Oral every 4 hours PRN  oxyCODONE    5 mG/acetaminophen 325 mG 1 Tablet(s) Oral every 4 hours PRN  diltiazem    Tablet 30 milliGRAM(s) Oral every 8 hours      Vital Signs Last 24 Hrs  T(C): 36.7 (27 Aug 2017 03:00), Max: 36.7 (27 Aug 2017 03:00)  T(F): 98 (27 Aug 2017 03:00), Max: 98 (27 Aug 2017 03:00)  HR: 80 (27 Aug 2017 09:30) (70 - 123)  BP: 113/53 (27 Aug 2017 09:30) (101/50 - 157/65)  BP(mean): 76 (27 Aug 2017 09:30) (71 - 93)  RR: 22 (27 Aug 2017 09:30) (9 - 30)  SpO2: 96% (27 Aug 2017 09:30) (91% - 100%)    PHYSICAL EXAM:    Constitutional: non-toxic    Eyes: non-icteric    ENMT: oropharynx clear    Neck: no NISREEN    Respiratory: clear to auscultation b/l    Cardiovascular: S1/S2, no murmur    Gastrointestinal: soft, NT/ND, normal BS    Genitourinary: no maxwell    Vascular: no edema    Neurological: non-focal    Skin: no rash, no phlebitis    Psychiatric: alert and oriented, affect appropriate    Lines: RIJ TLC                          8.8    20.5  )-----------( 314      ( 27 Aug 2017 01:58 )             27.0       08-27    139  |  104  |  9   ----------------------------<  133<H>  3.9   |  26  |  0.38<L>    Ca    8.0<L>      27 Aug 2017 01:58  Phos  1.9     08-27    TPro  5.1<L>  /  Alb  2.5<L>  /  TBili  0.4  /  DBili  x   /  AST  22  /  ALT  18  /  AlkPhos  62  08-27      MICROBIOLOGY:  RECENT CULTURES:  Follow Up:      Interval History/ROS:Patient is a 72y old  Female who presents with a chief complaint of Afib (22 Aug 2017 20:06)    pleural fluid cultures with fusobacterium (2nd sample with GNR), blood cultures are negative, leukocytosis trending down, pt with some pain around chest tube, otherwise feels ok    Allergies    No Known Allergies      ANTIMICROBIALS:    meropenem IVPB 1000 every 8 hours      OTHER MEDS:  atorvastatin 40 milliGRAM(s) Oral at bedtime  aspirin  chewable 81 milliGRAM(s) Oral daily  diltiazem Infusion 5 mG/Hr IV Continuous <Continuous>  docusate sodium 100 milliGRAM(s) Oral three times a day  enoxaparin Injectable 40 milliGRAM(s) SubCutaneous daily  famotidine Injectable 20 milliGRAM(s) IV Push two times a day  multiple electrolytes Injection Type 1 1000 milliLiter(s) IV Continuous <Continuous>  dextrose 5%. 1000 milliLiter(s) IV Continuous <Continuous>  dextrose Gel 1 Dose(s) Oral once PRN  dextrose 50% Injectable 12.5 Gram(s) IV Push once  dextrose 50% Injectable 25 Gram(s) IV Push once  dextrose 50% Injectable 25 Gram(s) IV Push once  glucagon  Injectable 1 milliGRAM(s) IntraMuscular once PRN  insulin lispro (HumaLOG) corrective regimen sliding scale   SubCutaneous three times a day before meals  insulin lispro (HumaLOG) corrective regimen sliding scale   SubCutaneous at bedtime  oxyCODONE    5 mG/acetaminophen 325 mG 2 Tablet(s) Oral every 4 hours PRN  oxyCODONE    5 mG/acetaminophen 325 mG 1 Tablet(s) Oral every 4 hours PRN  diltiazem    Tablet 30 milliGRAM(s) Oral every 8 hours      Vital Signs Last 24 Hrs  T(C): 36.7 (27 Aug 2017 03:00), Max: 36.7 (27 Aug 2017 03:00)  T(F): 98 (27 Aug 2017 03:00), Max: 98 (27 Aug 2017 03:00)  HR: 80 (27 Aug 2017 09:30) (70 - 123)  BP: 113/53 (27 Aug 2017 09:30) (101/50 - 157/65)  BP(mean): 76 (27 Aug 2017 09:30) (71 - 93)  RR: 22 (27 Aug 2017 09:30) (9 - 30)  SpO2: 96% (27 Aug 2017 09:30) (91% - 100%)    PHYSICAL EXAM:    Constitutional: non-toxic    Eyes: non-icteric    ENMT: poor dentition    Neck: no NISREEN    Respiratory: clear to auscultation b/l, chest tube x 3    Cardiovascular: S1/S2, no murmur    Gastrointestinal: soft, NT/ND, normal BS    Genitourinary: no maxwell    Vascular: no edema    MSK: L arm bandaged    Neurological: non-focal    Skin: some ecchymosis no rash    Psychiatric: alert and oriented, affect appropriate    Lines: RIJ TLC                          8.8    20.5  )-----------( 314      ( 27 Aug 2017 01:58 )             27.0       08-27    139  |  104  |  9   ----------------------------<  133<H>  3.9   |  26  |  0.38<L>    Ca    8.0<L>      27 Aug 2017 01:58  Phos  1.9     08-27    TPro  5.1<L>  /  Alb  2.5<L>  /  TBili  0.4  /  DBili  x   /  AST  22  /  ALT  18  /  AlkPhos  62  08-27    Culture - Blood (08.26.17 @ 02:31)    Specimen Source: .Blood Blood-Peripheral    Culture Results:   No growth to date.    MICROBIOLOGY:  RECENT CULTURES:    Culture - Blood (08.26.17 @ 05:49)    Specimen Source: .Blood Blood-Peripheral    Culture Results:   No growth to date.    Culture - Tissue with Gram Stain (08.26.17 @ 01:15)    Gram Stain:   No polymorphonuclear cells seen  No organisms seen    Specimen Source: .Tissue 2 right plueral rind for c+s    Culture Results:   No growth to date.        Culture - Body Fluid with Gram Stain (08.24.17 @ 13:05)    Gram Stain:   Rare polymorphonuclear leukocytes per low power field  No organisms seen    Specimen Source: .Body Fluid Pleural Fluid    Culture Results:   Moderate Fusobacterium nucleatum

## 2017-08-27 NOTE — PROGRESS NOTE ADULT - ASSESSMENT
ASSESMENT :  s/p R thoracotomy;  R Pleural Empyema;( fusobacterium)  A fib (newly diagnosed) with Rapid ventricular rate  anemia  L Humerus fracture    PLAN:   =Neuro:analgesics   oxyCODONE    5 mG/acetaminophen 325 mG PRN    = Respiratory:   incentive spirometry; A/Mask 40%    =CVS/Hem: atrial fibrillation rate control with cardizem drip; transition to po cardizem.  diltiazem Infusion 10 mg /h <Continuous>  started diltiazem    Tablet 30 every 8 hours  aspirin  chewable 81 daily  enoxaparin Injectable 40 daily; however discussed with ; we can restart heparin drip ( anticoagulation with heparin drip , aim low therapeutic PTT)    =Renal:   Ceja:  continue to  Monitor I/Os   =GI:  docusate sodium 100 three times a day  famotidine Injectable 20 two times a day  multiple electrolytes Injection Type 1 1000 <Continuous>  dextrose 5%. 1000 <Continuous>    =Endo: Glycemic control; F/S with coverage;statin  atorvastatin 40 at bedtime  insulin lispro (HumaLOG) corrective regimen sliding scale  three times a day before meals  insulin lispro (HumaLOG) corrective regimen sliding scale  at bedtime    =Antibiotics:  discussed with ID yesterday; d/c vancomycin; continue meropenem;    meropenem IVPB 1000 every 8 hours.  WBC decreasing.    Pertinent clinical, laboratory, radiographic, hemodynamic, echocardiographic, respiratory data, microbiologic data and chart were reviewed and analyzed frequently .   GI and DVT prophylaxis, glycemic control, head of bed elevation and skin care issues were addressed.  Patient seen, examined and plan discussed with CT U team.    patient remains critical;  I spent 35 minutes of critical care time, noncontiuous, with this patient

## 2017-08-27 NOTE — PROGRESS NOTE ADULT - SUBJECTIVE AND OBJECTIVE BOX
Afebrile. O2 sat 97% on 5 liters nasal O2.  Out of bed to chair, in no respiratory discomfort.  Complains of R chest discomfort/pain  No SOB. Occasional cough.    Vital Signs Last 24 Hrs  T(C): 36.6 (27 Aug 2017 12:15), Max: 36.7 (27 Aug 2017 03:00)  T(F): 97.8 (27 Aug 2017 12:15), Max: 98 (27 Aug 2017 03:00)  HR: 91 (27 Aug 2017 13:30) (70 - 123)  BP: 116/53 (27 Aug 2017 13:30) (101/49 - 157/65)  BP(mean): 77 (27 Aug 2017 13:30) (70 - 93)  RR: 20 (27 Aug 2017 13:30) (8 - 30)  SpO2: 97% (27 Aug 2017 13:30) (91% - 99%)    Medications:  MEDICATIONS  (STANDING):  atorvastatin 40 milliGRAM(s) Oral at bedtime  aspirin  chewable 81 milliGRAM(s) Oral daily  diltiazem Infusion 5 mG/Hr (5 mL/Hr) IV Continuous <Continuous>  docusate sodium 100 milliGRAM(s) Oral three times a day  meropenem IVPB   IV Intermittent   meropenem IVPB 1000 milliGRAM(s) IV Intermittent every 8 hours  famotidine Injectable 20 milliGRAM(s) IV Push two times a day  multiple electrolytes Injection Type 1 1000 milliLiter(s) (75 mL/Hr) IV Continuous <Continuous>  dextrose 5%. 1000 milliLiter(s) (50 mL/Hr) IV Continuous <Continuous>  dextrose 50% Injectable 12.5 Gram(s) IV Push once  dextrose 50% Injectable 25 Gram(s) IV Push once  dextrose 50% Injectable 25 Gram(s) IV Push once  insulin lispro (HumaLOG) corrective regimen sliding scale   SubCutaneous three times a day before meals  insulin lispro (HumaLOG) corrective regimen sliding scale   SubCutaneous at bedtime  diltiazem    Tablet 30 milliGRAM(s) Oral every 8 hours  heparin  Infusion 500 Unit(s)/Hr (5 mL/Hr) IV Continuous <Continuous>    MEDICATIONS  (PRN):  dextrose Gel 1 Dose(s) Oral once PRN Blood Glucose LESS THAN 70 milliGRAM(s)/deciLiter  glucagon  Injectable 1 milliGRAM(s) IntraMuscular once PRN Glucose <70 milliGRAM(s)/deciLiter  oxyCODONE    5 mG/acetaminophen 325 mG 2 Tablet(s) Oral every 4 hours PRN Severe Pain (7 - 10)  oxyCODONE    5 mG/acetaminophen 325 mG 1 Tablet(s) Oral every 4 hours PRN Moderate Pain (4 - 6)      Allergies    No Known Allergies    Intolerances        Physical Examination:    Pleasant  Neck: no JVD, LAD, accessory muscle use  PULM: Mildly decreased breath sounds on right, with crackles posteriorly.  CVS: IRR  Abdomen: nontender.  Extremities: Sequential ERIC's in place    CXR:  Postop changes on right. Chest tubes in place.    LAB;  wbc 20,500    Plan:  1. Incentive spirometry.  2. DVT prophylaxis. To begin IV Heparin when okay with Thoracic.  3. On Meropenem. Check surgical cultures.  4. Chest tube management as per Thoracic.  5. Pain control.    POC: Truman Vazquez M.D.  [phone]

## 2017-08-28 LAB
ANION GAP SERPL CALC-SCNC: 9 MMOL/L — SIGNIFICANT CHANGE UP (ref 5–17)
APTT BLD: 32.1 SEC — SIGNIFICANT CHANGE UP (ref 27.5–37.4)
APTT BLD: 32.7 SEC — SIGNIFICANT CHANGE UP (ref 27.5–37.4)
APTT BLD: 33.8 SEC — SIGNIFICANT CHANGE UP (ref 27.5–37.4)
BUN SERPL-MCNC: 9 MG/DL — SIGNIFICANT CHANGE UP (ref 7–23)
CALCIUM SERPL-MCNC: 8.2 MG/DL — LOW (ref 8.4–10.5)
CHLORIDE SERPL-SCNC: 104 MMOL/L — SIGNIFICANT CHANGE UP (ref 96–108)
CO2 SERPL-SCNC: 27 MMOL/L — SIGNIFICANT CHANGE UP (ref 22–31)
CREAT SERPL-MCNC: 0.35 MG/DL — LOW (ref 0.5–1.3)
CULTURE RESULTS: SIGNIFICANT CHANGE UP
CULTURE RESULTS: SIGNIFICANT CHANGE UP
GLUCOSE SERPL-MCNC: 133 MG/DL — HIGH (ref 70–99)
HCT VFR BLD CALC: 28.4 % — LOW (ref 34.5–45)
HGB BLD-MCNC: 9.2 G/DL — LOW (ref 11.5–15.5)
INR BLD: 1.23 RATIO — HIGH (ref 0.88–1.16)
INR BLD: 1.32 RATIO — HIGH (ref 0.88–1.16)
MCHC RBC-ENTMCNC: 30.2 PG — SIGNIFICANT CHANGE UP (ref 27–34)
MCHC RBC-ENTMCNC: 32.3 GM/DL — SIGNIFICANT CHANGE UP (ref 32–36)
MCV RBC AUTO: 93.4 FL — SIGNIFICANT CHANGE UP (ref 80–100)
NON-GYNECOLOGICAL CYTOLOGY STUDY: SIGNIFICANT CHANGE UP
PLATELET # BLD AUTO: 369 K/UL — SIGNIFICANT CHANGE UP (ref 150–400)
POTASSIUM SERPL-MCNC: 4 MMOL/L — SIGNIFICANT CHANGE UP (ref 3.5–5.3)
POTASSIUM SERPL-SCNC: 4 MMOL/L — SIGNIFICANT CHANGE UP (ref 3.5–5.3)
PROTHROM AB SERPL-ACNC: 13.3 SEC — HIGH (ref 9.8–12.7)
PROTHROM AB SERPL-ACNC: 14.5 SEC — HIGH (ref 9.8–12.7)
RBC # BLD: 3.04 M/UL — LOW (ref 3.8–5.2)
RBC # FLD: 12.7 % — SIGNIFICANT CHANGE UP (ref 10.3–14.5)
SODIUM SERPL-SCNC: 140 MMOL/L — SIGNIFICANT CHANGE UP (ref 135–145)
SPECIMEN SOURCE: SIGNIFICANT CHANGE UP
SPECIMEN SOURCE: SIGNIFICANT CHANGE UP
WBC # BLD: 21.2 K/UL — HIGH (ref 3.8–10.5)
WBC # FLD AUTO: 21.2 K/UL — HIGH (ref 3.8–10.5)

## 2017-08-28 PROCEDURE — 71010: CPT | Mod: 26

## 2017-08-28 PROCEDURE — 99232 SBSQ HOSP IP/OBS MODERATE 35: CPT

## 2017-08-28 PROCEDURE — 99233 SBSQ HOSP IP/OBS HIGH 50: CPT

## 2017-08-28 PROCEDURE — 93010 ELECTROCARDIOGRAM REPORT: CPT

## 2017-08-28 RX ORDER — METOPROLOL TARTRATE 50 MG
5 TABLET ORAL ONCE
Qty: 0 | Refills: 0 | Status: COMPLETED | OUTPATIENT
Start: 2017-08-28 | End: 2017-08-28

## 2017-08-28 RX ORDER — HEPARIN SODIUM 5000 [USP'U]/ML
1000 INJECTION INTRAVENOUS; SUBCUTANEOUS
Qty: 25000 | Refills: 0 | Status: DISCONTINUED | OUTPATIENT
Start: 2017-08-28 | End: 2017-08-29

## 2017-08-28 RX ORDER — METOPROLOL TARTRATE 50 MG
25 TABLET ORAL EVERY 12 HOURS
Qty: 0 | Refills: 0 | Status: DISCONTINUED | OUTPATIENT
Start: 2017-08-28 | End: 2017-08-29

## 2017-08-28 RX ORDER — FAMOTIDINE 10 MG/ML
20 INJECTION INTRAVENOUS DAILY
Qty: 0 | Refills: 0 | Status: DISCONTINUED | OUTPATIENT
Start: 2017-08-28 | End: 2017-09-06

## 2017-08-28 RX ORDER — DEXMEDETOMIDINE HYDROCHLORIDE IN 0.9% SODIUM CHLORIDE 4 UG/ML
0.4 INJECTION INTRAVENOUS
Qty: 200 | Refills: 0 | Status: DISCONTINUED | OUTPATIENT
Start: 2017-08-28 | End: 2017-08-28

## 2017-08-28 RX ORDER — HEPARIN SODIUM 5000 [USP'U]/ML
700 INJECTION INTRAVENOUS; SUBCUTANEOUS
Qty: 25000 | Refills: 0 | Status: DISCONTINUED | OUTPATIENT
Start: 2017-08-28 | End: 2017-08-28

## 2017-08-28 RX ADMIN — OXYCODONE AND ACETAMINOPHEN 2 TABLET(S): 5; 325 TABLET ORAL at 06:48

## 2017-08-28 RX ADMIN — OXYCODONE AND ACETAMINOPHEN 2 TABLET(S): 5; 325 TABLET ORAL at 15:28

## 2017-08-28 RX ADMIN — Medication 25 MILLIGRAM(S): at 17:39

## 2017-08-28 RX ADMIN — MEROPENEM 200 MILLIGRAM(S): 1 INJECTION INTRAVENOUS at 05:55

## 2017-08-28 RX ADMIN — Medication 81 MILLIGRAM(S): at 11:58

## 2017-08-28 RX ADMIN — FAMOTIDINE 20 MILLIGRAM(S): 10 INJECTION INTRAVENOUS at 11:58

## 2017-08-28 RX ADMIN — OXYCODONE AND ACETAMINOPHEN 2 TABLET(S): 5; 325 TABLET ORAL at 14:24

## 2017-08-28 RX ADMIN — HEPARIN SODIUM 7 UNIT(S)/HR: 5000 INJECTION INTRAVENOUS; SUBCUTANEOUS at 01:36

## 2017-08-28 RX ADMIN — Medication 5 MILLIGRAM(S): at 21:25

## 2017-08-28 RX ADMIN — OXYCODONE AND ACETAMINOPHEN 2 TABLET(S): 5; 325 TABLET ORAL at 19:36

## 2017-08-28 RX ADMIN — HEPARIN SODIUM 10 UNIT(S)/HR: 5000 INJECTION INTRAVENOUS; SUBCUTANEOUS at 19:35

## 2017-08-28 RX ADMIN — MEROPENEM 200 MILLIGRAM(S): 1 INJECTION INTRAVENOUS at 14:27

## 2017-08-28 RX ADMIN — OXYCODONE AND ACETAMINOPHEN 2 TABLET(S): 5; 325 TABLET ORAL at 05:56

## 2017-08-28 RX ADMIN — Medication 25 MILLIGRAM(S): at 11:57

## 2017-08-28 RX ADMIN — FAMOTIDINE 20 MILLIGRAM(S): 10 INJECTION INTRAVENOUS at 05:55

## 2017-08-28 RX ADMIN — OXYCODONE AND ACETAMINOPHEN 2 TABLET(S): 5; 325 TABLET ORAL at 18:38

## 2017-08-28 RX ADMIN — MEROPENEM 200 MILLIGRAM(S): 1 INJECTION INTRAVENOUS at 22:00

## 2017-08-28 RX ADMIN — Medication 100 MILLIGRAM(S): at 05:56

## 2017-08-28 NOTE — PROGRESS NOTE ADULT - PROBLEM SELECTOR PLAN 4
-Keep K >4
-Pt with left humeral fracture  -NWB LUE  -Tylenol for pain control   -Continue sling over LUE
-Pt with left humeral fracture  -NWB LUE  -Tylenol for pain control   -Continue sling over LUE
Protonix

## 2017-08-28 NOTE — PROGRESS NOTE ADULT - ASSESSMENT
72 year old female with htn, fracture of Left arm, GERD, HLD admitted for new onset  Afib with RVR/ PNA/ s/p thoracotomy for empyema     Cv stable post op  af rate control per cticu  cont a/c for af   IV abx  DVT ppx

## 2017-08-28 NOTE — PROGRESS NOTE ADULT - ASSESSMENT
Patient care discussed on morning interdisciplinary rounds with CTS/ICU team.   Contact: [phone]    72 y.o. female with PMH significant for HTN, HLD, GERD, Insomnia s/p mechanical fall 5/2017 planned for ORIF of left humerous but found to be in new onset rapid Afib with  left pleural effusion concerning for sepsis now s/p Right VATS, drainage of empyema and decortication POD #3, extubated, remains in rapid AFib on cardizem.

## 2017-08-28 NOTE — PROGRESS NOTE ADULT - ASSESSMENT
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 can place PICC, will need ~3 weeks abx  2. can change meropenem to unasyn 3g IV 6  3. trend WBC    Pager [phone].  After 5pm or weekends call ID office at [phone].

## 2017-08-28 NOTE — DIETITIAN INITIAL EVALUATION ADULT. - NUTRITION INTERVENTION
Meals and Snack/Collaboration and Referral of Nutrition Care/Nutrition Education/Feeding Assistance/Medical Food Supplements

## 2017-08-28 NOTE — CHART NOTE - NSCHARTNOTEFT_GEN_A_CORE
Medicine NP    Notified by RN patient with afib 160s and refusing lopressor 5mg IVP x1 dose. Remained in 160s when evaluated patient. Patient is sitting in chair, AO1-2. Denies CP or palpitations. Refusing lopressor and states "I don't need it. I didn't do anything wrong." Educated patient of risks on not controlling high HR in afib , and still refuses medication. Spoke to daughter (Bea) on phone to speak to patient regarding the refusal and risks of not taking the medication. Patient eventually accepted lopressor IV, now in afib 110-120s. Continue to monitor.    Jing Enriquez, [facility]  [phone]

## 2017-08-28 NOTE — PROGRESS NOTE ADULT - ASSESSMENT
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 - procedure had to be converted to formal thoracotomy due to dense adhesions and subpulmonic location of process    post-op confusion likely related to prolonged anesthesia effect and sedation resolve    atrial fibrillation with RVR now with rate control on oral cardizem     right humeral fracture    PLAN/RECOMMENDATIONS    oxygen supplementation to keep saturation greater than 92%  on meropenem - ID evaluation for deescalation of antibiotics - will need a three week course from the time of surgery  orthopedic follow-up - it is unclear when the humeral fracture can be repaired (ie does the full course of antibiotics need to be completed)  pain control/incentive spirometry  chest tube to low wall suction - follow air leak and CXR  AF rate control  - cardizem po/restarted on full dose heparin gtt - watch for post-operative bleeding - GI prophylaxis on A/C  encourage po        Will follow with you;     Truman Arellano MD, [facility] - [phone]  Pulmonary Medicine

## 2017-08-28 NOTE — DIETITIAN INITIAL EVALUATION ADULT. - NS AS NUTRI INTERV ED CONTENT
Other (specify)/Reviewed generally healthful diet. Small frequent, nutrient dense meals encouraged. RD remains available to monitor PO intake, wt, labs and diet education.

## 2017-08-28 NOTE — PROGRESS NOTE ADULT - PROBLEM SELECTOR PROBLEM 3
Displaced spiral fracture of shaft of humerus, left arm, subsequent encounter for fracture with routine healing
Pleural effusion
Pleural effusion
Displaced spiral fracture of shaft of humerus, left arm, subsequent encounter for fracture with routine healing

## 2017-08-28 NOTE — PROGRESS NOTE ADULT - PROBLEM SELECTOR PROBLEM 4
Hypokalemia
Displaced spiral fracture of shaft of humerus, left arm, subsequent encounter for fracture with routine healing
Displaced spiral fracture of shaft of humerus, left arm, subsequent encounter for fracture with routine healing
GERD (gastroesophageal reflux disease)

## 2017-08-28 NOTE — PROGRESS NOTE ADULT - SUBJECTIVE AND OBJECTIVE BOX
Operation: Right VATS, drainage of empyema and decortication POD #3    Narrative: 72 year old, slightly confused and agitated, stating "I want to go home", pt re-assured, started on precedex. Remains in Rapid Afib on IV cardizem along with PO cardizem (increased to 30mg q6h) so heparin gtt remains for anticoagulation, stable chest tube output.    Vital Signs Last 24 Hrs:  T(C): 36 (27 Aug 2017 16:00), Max: 36.7 (27 Aug 2017 03:00)  T(F): 96.8 (27 Aug 2017 16:00), Max: 98 (27 Aug 2017 03:00)  HR: 104 (27 Aug 2017 23:00) (70 - 132)  BP: 139/64 (27 Aug 2017 23:00) (101/49 - 148/67)  BP(mean): 92 (27 Aug 2017 23:00) (68 - 96)  RR: 10 (27 Aug 2017 23:00) (8 - 30)  SpO2: 97% (27 Aug 2017 23:00) (72% - 100%)      08-26 @ 07:01 - 08-27 @ 07:00  --------------------------------------------------------  IN:    diltiazem Infusion: 180 mL    IV PiggyBack: 700 mL    multiple electrolytes Injection Type 1: 1500 mL    Oral Fluid: 240 mL    propofol Infusion: 38.5 mL  Total IN: 2658.5 mL    OUT:    Chest Tube: 235 mL    Chest Tube: 50 mL    Chest Tube: 50 mL    Indwelling Catheter - Urethral: 845 mL  Total OUT: 1180 mL    Total NET: 1478.5 mL      08-27 @ 07:01 - 08-28 @ 00:08  --------------------------------------------------------  IN:    diltiazem Infusion: 75 mL    diltiazem Infusion: 10 mL    heparin Infusion: 35 mL    heparin Infusion: 24 mL    Oral Fluid: 480 mL  Total IN: 624 mL    OUT:    Chest Tube: 40 mL    Chest Tube: 50 mL    Chest Tube: 80 mL    Indwelling Catheter - Urethral: 625 mL  Total OUT: 795 mL    Total NET: -171 mL    LABS:                        9.5    22.2  )-----------( 364      ( 27 Aug 2017 18:38 )             28.4     08-27    139  |  104  |  9   ----------------------------<  133<H>  3.9   |  26  |  0.38<L>    Ca    8.0<L>      27 Aug 2017 01:58  Phos  1.9     08-27    TPro  5.1<L>  /  Alb  2.5<L>  /  TBili  0.4  /  DBili  x   /  AST  22  /  ALT  18  /  AlkPhos  62  08-27    PT/INR - ( 27 Aug 2017 01:58 )   PT: 14.6 sec;   INR: 1.33 ratio         PTT - ( 27 Aug 2017 18:38 )  PTT:28.8 sec  ABG - ( 26 Aug 2017 12:24 )  pH: 7.43  /  pCO2: 38    /  pO2: 99    / HCO3: 25    / Base Excess: 1.2   /  SaO2: 98            MEDICATIONS  (STANDING):  atorvastatin 40 milliGRAM(s) Oral at bedtime  aspirin  chewable 81 milliGRAM(s) Oral daily  docusate sodium 100 milliGRAM(s) Oral three times a day  meropenem IVPB   IV Intermittent   meropenem IVPB 1000 milliGRAM(s) IV Intermittent every 8 hours  famotidine Injectable 20 milliGRAM(s) IV Push two times a day  insulin lispro (HumaLOG) corrective regimen sliding scale   SubCutaneous three times a day before meals  insulin lispro (HumaLOG) corrective regimen sliding scale   SubCutaneous at bedtime  heparin  Infusion 600 Unit(s)/Hr (6 mL/Hr) IV Continuous <Continuous>  diltiazem    Tablet 30 milliGRAM(s) Oral every 6 hours  diltiazem Infusion 10 mG/Hr (10 mL/Hr) IV Continuous <Continuous>  dexmedetomidine Infusion 0.4 MICROgram(s)/kG/Hr (5.11 mL/Hr) IV Continuous <Continuous>    MEDICATIONS  (PRN):  oxyCODONE    5 mG/acetaminophen 325 mG 2 Tablet(s) Oral every 4 hours PRN Severe Pain (7 - 10)  oxyCODONE    5 mG/acetaminophen 325 mG 1 Tablet(s) Oral every 4 hours PRN Moderate Pain (4 - 6)      PHYSICAL EXAM:  General: A+O to self, re-oriented to time and place, currently agitated, GARCIA  Cardiovascular: Irregular rate and rhythm  Lungs: Clear to auscultation, diminished at bilateral bases  Abdomen: Soft, Non-distended, non-tender, positive BS  Extremities: Warm, well perfused, palpable distal pulses, no edema. Left arm in dressing/sling, hand swollen, palpable pulses, fingers warm  Incision: Right posterior incisions clean, dry, intact.   Lines: Right IJ DL,  PIV  Drains: Ceja catheter. Right pleural chest tubes x3 (posterior, diaphragm, anterior) with sero-sang drainage, to low wall suction, no air leak.     RADIOLOGY & ADDITIONAL TESTS: AM CXR pending                         ADDITIONAL DATA:   Does the patient have a history of CHF? No  -Pre-op EF: no echo documented  Does the patient currently have heart failure: No    Acute MI during admission or prior to transfer (within 8 weeks)? No    Extubation within 24 hours? Yes    Does the patient have a history of kidney disease? No  -Patient's baseline Creatinine: 0.59    Did the patient receive transfusion of blood and/or products? Yes (3PRBC, 2FFP)  -If yes, indication: Acute blood loss anemia    Mallory-operative antibiotics discontinued within 48 hours of CTU admission? No, on meropenum for GNR in pleural fluid

## 2017-08-28 NOTE — PROGRESS NOTE ADULT - SUBJECTIVE AND OBJECTIVE BOX
[facility] - [facility]     PROGRESS NOTE    CHIEF COMPLAINT: empymea    INTERVAL HISTORY: confusion after extubation on precedex has resolved; now awake and alert sitting in the chair; hemodynamically stable off heparin gtt with rate controlled with oral diltiazem; no fevers, chills, sweats, fatigue or malaise; no cough, sputum production, chest congestion or wheeze; poor appetite; no chest pain/pressure or palpitations; s/p thoracotomy for fusabacterium empyema  Procedure begun thoracoscopically. Large amount of sridevi purulence encountered - aspirate sent for culture and sensitivity, remainder suctioned (~600 mL). VATS decortication begun, but dissection hampered by notably thick rind, dense adhesions and friable pleura. Procedure converted to open thoracotomy, and decortication completed. Small diaphragmatic tear - not sutured. Three chest tubes left in place (all 28 fr), straight anterior, straight posterior apical, angled overlying diaphragm. Flexible bronchoscopy performed at close of case.	      REVIEW OF SYSTEMS:  Constitutional: As per interval history  HEENT: Within normal limits  CV: As per interval history  Resp: As per interval history  GI: Within normal limits   : Within normal limits  Musculoskeletal: Within normal limits  Skin: Within normal limits  Neurological: Within normal limits  Psychiatric: Within normal limits  Endocrine: Within normal limits  Hematologic/Lymphatic: Within normal limits  Allergic/Immunologic: Within normal limits    MEDICATIONS:     Pulmonary "      Anti-microbials:  meropenem IVPB   IV Intermittent   meropenem IVPB 1000 milliGRAM(s) IV Intermittent every 8 hours      Cardiovascular:  diltiazem    Tablet 30 milliGRAM(s) Oral every 6 hours  diltiazem Infusion 10 mG/Hr IV Continuous <Continuous>      Other:  atorvastatin 40 milliGRAM(s) Oral at bedtime  aspirin  chewable 81 milliGRAM(s) Oral daily  docusate sodium 100 milliGRAM(s) Oral three times a day  famotidine Injectable 20 milliGRAM(s) IV Push two times a day  multiple electrolytes Injection Type 1 1000 milliLiter(s) IV Continuous <Continuous>  dextrose 5%. 1000 milliLiter(s) IV Continuous <Continuous>  dextrose Gel 1 Dose(s) Oral once PRN  dextrose 50% Injectable 12.5 Gram(s) IV Push once  dextrose 50% Injectable 25 Gram(s) IV Push once  dextrose 50% Injectable 25 Gram(s) IV Push once  glucagon  Injectable 1 milliGRAM(s) IntraMuscular once PRN  insulin lispro (HumaLOG) corrective regimen sliding scale   SubCutaneous three times a day before meals  insulin lispro (HumaLOG) corrective regimen sliding scale   SubCutaneous at bedtime  oxyCODONE    5 mG/acetaminophen 325 mG 2 Tablet(s) Oral every 4 hours PRN  oxyCODONE    5 mG/acetaminophen 325 mG 1 Tablet(s) Oral every 4 hours PRN  dexmedetomidine Infusion 0.4 MICROgram(s)/kG/Hr IV Continuous <Continuous>  heparin  Infusion 700 Unit(s)/Hr IV Continuous <Continuous>        OBJECTIVE:        I&O's Detail    27 Aug 2017 07:  -  28 Aug 2017 07:00  --------------------------------------------------------  IN:    dexmedetomidine Infusion: 12.8 mL    diltiazem Infusion: 50 mL    diltiazem Infusion: 75 mL    heparin Infusion: 36 mL    heparin Infusion: 35 mL    heparin Infusion: 42 mL    Oral Fluid: 717 mL  Total IN: 967.8 mL    OUT:    Chest Tube: 50 mL    Chest Tube: 40 mL    Chest Tube: 130 mL    Indwelling Catheter - Urethral: 845 mL  Total OUT: 1065 mL    Total NET: -97.2 mL      28 Aug 2017 07:01  -  28 Aug 2017 08:55  --------------------------------------------------------  IN:    heparin Infusion: 7 mL  Total IN: 7 mL    OUT:    Voided: 50 mL  Total OUT: 50 mL    Total NET: -43 mL       Daily Weight in k.4 (28 Aug 2017 00:00)    CAPILLARY BLOOD GLUCOSE  110 (28 Aug 2017 08:00)  122 (27 Aug 2017 22:00)  137 (27 Aug 2017 17:00)  175 (27 Aug 2017 12:00)      PHYSICAL EXAM:       ICU Vital Signs Last 24 Hrs  T(C): 36.5 (28 Aug 2017 07:00), Max: 36.6 (27 Aug 2017 12:15)  T(F): 97.7 (28 Aug 2017 07:00), Max: 97.8 (27 Aug 2017 12:15)  HR: 102 (28 Aug 2017 08:00) (65 - 138)  BP: 132/60 (28 Aug 2017 08:00) (101/49 - 148/67)  BP(mean): 87 (28 Aug 2017 08:00) (68 - 100)  ABP: --  ABP(mean): --  RR: 11 (28 Aug 2017 08:00) (7 - 28)  SpO2: 100% (28 Aug 2017 08:00) (72% - 100%) on 2lpm     General: Awake. Alert. Cooperative. No distress. Appears stated age 	  HEENT:   AT/NC/Anicteric. PERRL/EOMI. Normal oral mucosa,  Neck: Supple. Trachea midline. Thyroid without enlargement/tenderness/nodules. No carotid bruit. No JVD	  Cardiovascular: Tachycardic and Irregularly irregular rate and rhythm. S1 S2 normal. II/VI systolic murmur  Respiratory: Respirations unlabored. Diffuse right sided rales ~ 2/3 up - 3 chest tubes in place one with air leak  Abdomen: Soft. Non-tender. Non-distended. No organomegaly. No masses. Normal BS  Extremities: Warm to touch. No mild limb edema; bilateral SCDs. LUE in hard brace  Pulses: 2+ peripheral pulses all extremities	  Skin: Thoracotomy incision bandaged - C/D/I  Lymph Nodes: Cervical, supraclavicular and axillary nodes normal  Neurological: Motor and sensory examination equal and normal. A and O x 3  Psychiatry: Appropriate mood and affect.      LABS:                        9.2    21.2  )-----------( 369      ( 28 Aug 2017 01:14 )             28.4                         9.5    22.2  )-----------( 364      ( 27 Aug 2017 18:38 )             28.4         140  |  104  |  9   ----------------------------<  133<H>  4.0   |  27  |  0.35<L>        139  |  104  |  9   ----------------------------<  133<H>  3.9   |  26  |  0.38<L>    Ca      8.2<L>          Ca      8.0<L>          Phos    1.9           TPro  5.1<L>  /  Alb  2.5<L>  /  TBili  0.4  /  DBili  x   /  AST  22  /  ALT  18  /  AlkPhos  62      TPro  4.9<L>  /  Alb  2.2<L>  /  TBili  1.3<H>  /  DBili  x   /  AST  24  /  ALT  22  /  AlkPhos  69      PT/INR - ( 28 Aug 2017 08:10 )   PT: 13.3 sec;   INR: 1.23 ratio         PTT - ( 28 Aug 2017 08:10 )  PTT:33.8 sec    Venous Blood Gas:   @ 21:53  7.37/48/38/27/67  VBG Lactate: 1.1    ABG - ( 26 Aug 2017 12:24 )  pH: 7.43  /  pCO2: 38    /  pO2: 99    / HCO3: 25    / Base Excess: 1.2   /  SaO2: 98          ABG - ( 26 Aug 2017 11:39 )  pH: 7.43  /  pCO2: 38    /  pO2: 98    / HCO3: 25    / Base Excess: 1.3   /  SaO2: 98        MICROBIOLOGY:     Culture - Blood (17 @ 05:49)    Specimen Source: .Blood Blood-Peripheral    Culture Results:   No growth to date.    Culture - Blood (17 @ 02:31)    Specimen Source: .Blood Blood-Peripheral    Culture Results:   No growth to date.    Culture - Body Fluid with Gram Stain (17 @ 00:50)    Gram Stain:   polymorphonuclear leukocytes seen  Gram Negative Rods seen    Specimen Source: .Body Fluid Pleural Fluid    Culture Results:   Numerous Fusobacterium nucleatum    Culture - Body Fluid with Gram Stain (17 @ 13:05)    Gram Stain:   Rare polymorphonuclear leukocytes per low power field  No organisms seen    Specimen Source: .Body Fluid Pleural Fluid    Culture Results:   Moderate Fusobacterium nucleatum    RADIOLOGY:    [x ] Chest radiographs reviewed and interpreted by me    CXR - unofficial - 2017 - diffuse opacification right hemithorax sparing apex; 3 right sided chest tubes  ---------------------------------------------------------------------------------------------------------      < from: Xray Chest 1 View AP- PORTABLE-Urgent (17 @ 23:39) >    EXAM:  CHEST-PORTABLE URGENT                            PROCEDURE DATE:  2017            INTERPRETATION:  EXAMINATION: CHEST-PORTABLE URGENT    CLINICAL INDICATION: S/p Rt thoracotomy, empyema.    TECHNIQUE: Single portable view of the chest was obtained.    COMPARISON: Radiograph of the chest 2017.    FINDINGS:     Right-sided central line with the tip terminating in the SVC. ET tube tip   is above the akshat. Enteric tube courses below the diaphragm with the   tip out of the field of view.     Right-sided chest tubes are visualized. There has been interval decrease   in size of the previously seen loculated right pleural effusion. There is   a small right pneumothorax. There is right-sided subcutaneous emphysema.   There is opacity in the right lower lung which may represent reexpansion   edema versus pneumonia..    IMPRESSION:     Decrease in size of the right pleural effusion. Small right pneumothorax   and subcutaneous emphysema in the right chest wall. Reexpansion edema  versus pneumonia in the right lower lung.    ED GARDUNO M.D., RADIOLOGY RESIDENT  This document has been electronically signed.  MARY GONZALEZ M.D., ATTENDING RADIOLOGIST  This document has been electronically signed. Aug 26 2017  5:03PM      < end of copied text >    ---------------------------------------------------------------------------------------------------------  < from: CT Angio Chest w/ IV Cont (17 @ 15:24) >    EXAM:  CT ANGIO CHEST (W)AW IC                            PROCEDURE DATE:  2017        INTERPRETATION:  CLINICAL INFORMATION: Hypoxia and rapid atrial   fibrillation. Pneumonia on x-ray dated 2017.    PROCEDURE:   CT Pulmonary Angiogram was performed with intravenous contrast.     Intravenous contrast: 90 ml Omnipaque 350. 10 ml discarded.    Reconstructions were performed in axial thin, axial maximum intensity   projection, sagittal and coronal planes.    COMPARISON: Chest x-raydated 2017 and 2017. MRI abdomen   2009.    FINDINGS:    LUNGS AND LARGE AIRWAYS: Patent central airways. Compressive atelectasis   of the bilateral lower lobes, greater on the right, and the right middle   lobe. Foci of peripherally oriented groundglass opacities in the left   upper lobe. Small amount of paraseptal emphysema is seen in the left   upper lobe.  PLEURA: Large multiloculated right pleural effusion with thickened and   mildly hyperdense peripheral wall, indicative of pleural thickening.   Small layering left pleural effusion.  VESSELS: No evidence of pulmonary embolism. Atherosclerotic   calcifications of the thoracic aorta and coronary arteries.  HEART: There is mild dilatation of the left atrium. No pericardial   effusion.  MEDIASTINUM AND JEAN PIERRE: No lymphadenopathy.  CHEST WALL AND LOWER NECK: Within normal limits.  UPPER ABDOMEN: Partially visualized peripherally calcified right renal   cyst, as seen on prior MRI abdomen dated 2009, with increased   calcifications from prior study. Unchanged hepatic calcification. Status   post cholecystectomy.  BONES: Degenerative spondylosis of the spine. Partially visualized   comminuted spiral fracture of the left humeral mid to distal shaft.    IMPRESSION:    No evidence of pulmonary embolism.    Large multiloculated right pleural effusion with pleural thickening,   concerning for possible infectious or neoplastic etiology. Compressive   partial atelectasis of the right lower and middle lobes.    Small left pleural effusion with associated compressive atelectasis of   the left lower lobe.    Patchy groundglass opacity in the left upper lobes which may be   infectious or related to pulmonary edema given the presence of pleural   effusion.    Partially visualized acute comminuted spiral fracture of the left humeral   mid to distal shaft.     SAVAGE CID M.D., RADIOLOGY RESIDENT  This document has been electronically signed.  JEANETH ROBERTS M.D. ATTENDING RADIOLOGIST  This document has beenelectronically signed. Aug 23 2017  4:29PM          < end of copied text >  ---------------------------------------------------------------------------------------------------------  < from: Xray Chest 1 View AP/PA (17 @ 13:07) >    EXAM:  CHEST SINGLE AP OR PA                            PROCEDURE DATE:  2017        INTERPRETATION:    CLINICAL INDICATION: A. fib    TECHNIQUE: Portable frontal view of the chest.    COMPARISON: Frontal chest radiograph from 2009.    FINDINGS:     There are calcifications of the aortic arch.  There is opacification of the right lower lung field with silhouetting of   the right hemithorax, consistent with right lower lobar pneumonia. Right   pleural effusion.  No pneumothorax.  No acute osseous abnormality.      IMPRESSION:   Right lower lobar pneumonia. Right pleural effusion.      JOSHUA RICHTER M.D., RADIOLOGY RESIDENT  This document has been electronically signed.  HOLLEY KEBEDE M.D., ATTENDING RADIOLOGIST  This document has been electronically signed. Aug 22 2017  2:28PM      < end of copied text >  ---------------------------------------------------------------------------------------------------------

## 2017-08-28 NOTE — DIETITIAN INITIAL EVALUATION ADULT. - NS AS NUTRI INTERV COLLABORAT
Malnutrition sticker placed in chart, Team aware. RD remains available to monitor PO intake, wt, labs and diet education review/Collaboration with other providers

## 2017-08-28 NOTE — DIETITIAN INITIAL EVALUATION ADULT. - ENERGY NEEDS
Ht: 5'3", Dosing Wt: 112.6lbs, BMI: 19.9kg/m2, IBW: 115lbs(+/-10%), 97%IBW  Pertinent information: Per chart, s/p fall 5/2017, admitted for planned Left arm ORIF. Pt found with Afib in RVR, with PNA, Right pleural effusion S/p VATS thoracotomy for decortication POD #3.   +1 generalized +2 left arm, left hand Edema, Skin intact

## 2017-08-28 NOTE — CHART NOTE - NSCHARTNOTEFT_GEN_A_CORE
Upon Nutritional Assessment by the Registered Dietitian your patient was determined to meet criteria / has evidence of the following diagnosis/diagnoses:          [ ]  Mild Protein Calorie Malnutrition        [ X]  Moderate Protein Calorie Malnutrition        [ ] Severe Protein Calorie Malnutrition        [ ] Unspecified Protein Calorie Malnutrition        [ ] Underweight / BMI <19        [ ] Morbid Obesity / BMI > 40      Findings as based on:  [X ] Comprehensive nutrition assessment   [ X] Nutrition Focused Physical Exam  [X ] Other:   12% Wt loss x1 months, visual muscle wasting, poor PO intake      Nutrition Plan/Recommendations:      Add Ensure Enlive x1    PROVIDER Section:     By signing this assessment you are acknowledging and agree with the diagnosis/diagnoses assigned by the Registered Dietitian    Comments:

## 2017-08-28 NOTE — PROGRESS NOTE ADULT - SUBJECTIVE AND OBJECTIVE BOX
Follow Up:      Interval History/ROS:Patient is a 72y old  Female who presents with a chief complaint of Afib (22 Aug 2017 20:06)    no fever/chills, no CP/SOB, TLC has been removed, still has 3 chest tubes, planned for transfer to floor    Allergies    No Known Allergies    ANTIMICROBIALS:    meropenem IVPB 1000 every 8 hours      OTHER MEDS:  atorvastatin 40 milliGRAM(s) Oral at bedtime  aspirin  chewable 81 milliGRAM(s) Oral daily  oxyCODONE    5 mG/acetaminophen 325 mG 2 Tablet(s) Oral every 4 hours PRN  oxyCODONE    5 mG/acetaminophen 325 mG 1 Tablet(s) Oral every 4 hours PRN  heparin  Infusion 700 Unit(s)/Hr IV Continuous <Continuous>  metoprolol 25 milliGRAM(s) Oral every 12 hours  famotidine    Tablet 20 milliGRAM(s) Oral daily      Vital Signs Last 24 Hrs  T(C): 36.7 (28 Aug 2017 12:00), Max: 36.7 (28 Aug 2017 12:00)  T(F): 98 (28 Aug 2017 12:00), Max: 98 (28 Aug 2017 12:00)  HR: 102 (28 Aug 2017 15:00) (65 - 138)  BP: 140/86 (28 Aug 2017 15:00) (102/53 - 161/67)  BP(mean): 107 (28 Aug 2017 15:00) (68 - 117)  RR: 19 (28 Aug 2017 15:00) (7 - 28)  SpO2: 99% (28 Aug 2017 15:00) (72% - 100%)    PHYSICAL EXAM:    Constitutional: non-toxic    Eyes: non-icteric    ENMT: oropharynx clear, no upper teeth, some missing lower teeth    Neck: no NISREEN    Respiratory: decreased BS on R, chest tube x3    Cardiovascular: S1/S2, no murmur    Gastrointestinal: soft, NT/ND, normal BS    Vascular: no edema    MSK: LUE bandaged     Skin: no rash, no phlebitis    Psychiatric: alert and oriented, affect appropriate    Lines: TLC removed                          9.2    21.2  )-----------( 369      ( 28 Aug 2017 01:14 )             28.4       08-28    140  |  104  |  9   ----------------------------<  133<H>  4.0   |  27  |  0.35<L>    Ca    8.2<L>      28 Aug 2017 01:14  Phos  1.9     08-27    TPro  5.1<L>  /  Alb  2.5<L>  /  TBili  0.4  /  DBili  x   /  AST  22  /  ALT  18  /  AlkPhos  62  08-27      MICROBIOLOGY:  RECENT CULTURES:    Culture - Blood (08.26.17 @ 05:49)    Specimen Source: .Blood Blood-Peripheral    Culture Results:   No growth to date.    Culture - Blood (08.26.17 @ 02:31)    Specimen Source: .Blood Blood-Peripheral    Culture Results:   No growth to date.    Culture - Tissue with Gram Stain (08.26.17 @ 01:15)    Gram Stain:   No polymorphonuclear cells seen  No organisms seen    Specimen Source: .Tissue 2 right plueral rind for c+s    Culture Results:   No growth to date.    Culture - Body Fluid with Gram Stain (08.26.17 @ 00:50)    Gram Stain:   polymorphonuclear leukocytes seen  Gram Negative Rods seen    Specimen Source: .Body Fluid Pleural Fluid    Culture Results:   Numerous Fusobacterium nucleatum    Culture - Body Fluid with Gram Stain (08.24.17 @ 13:05)    Gram Stain:   Rare polymorphonuclear leukocytes per low power field  No organisms seen    Specimen Source: .Body Fluid Pleural Fluid    Culture Results:   Moderate Fusobacterium nucleatum

## 2017-08-28 NOTE — PROGRESS NOTE ADULT - PROBLEM SELECTOR PLAN 3
-Pt with left humeral fracture  -NWB LUE  -Tylenol for pain control   -Continue sling over LUE
S/p Thoracentesis  F/up with Culture
S/p Thoracentesis  F/up with Culture
-F/U with ortho for possible ORIF when cleared by ID

## 2017-08-28 NOTE — DIETITIAN INITIAL EVALUATION ADULT. - OTHER INFO
Pt seen for LOS in CTU. Pt reports a good intake postoperatively; RD witnessed 50% of lunch consumed today.  Pt offered and accepted Ensure Enlive x1 daily to supplement PO intake. PO intake was encouraged, Pt denies to offer additional food preferences at this time. Pt takes Vit C PTA. Pt denies chewing/swallowing difficulty. Pt denies GI distress, reports last BM today. NKFA

## 2017-08-28 NOTE — PROGRESS NOTE ADULT - SUBJECTIVE AND OBJECTIVE BOX
Patient is a 72y old  Female who presents with a chief complaint of Afib (22 Aug 2017 20:06)      SUBJECTIVE / OVERNIGHT EVENTS:    MEDICATIONS  (STANDING):  atorvastatin 40 milliGRAM(s) Oral at bedtime  aspirin  chewable 81 milliGRAM(s) Oral daily  meropenem IVPB   IV Intermittent   meropenem IVPB 1000 milliGRAM(s) IV Intermittent every 8 hours  heparin  Infusion 700 Unit(s)/Hr (9 mL/Hr) IV Continuous <Continuous>  metoprolol 25 milliGRAM(s) Oral every 12 hours  famotidine    Tablet 20 milliGRAM(s) Oral daily    MEDICATIONS  (PRN):  oxyCODONE    5 mG/acetaminophen 325 mG 2 Tablet(s) Oral every 4 hours PRN Severe Pain (7 - 10)  oxyCODONE    5 mG/acetaminophen 325 mG 1 Tablet(s) Oral every 4 hours PRN Moderate Pain (4 - 6)      Vital Signs Last 24 Hrs  T(C): 36.7 (28 Aug 2017 12:00), Max: 36.7 (28 Aug 2017 12:00)  T(F): 98 (28 Aug 2017 12:00), Max: 98 (28 Aug 2017 12:00)  HR: 102 (28 Aug 2017 15:00) (65 - 138)  BP: 140/86 (28 Aug 2017 15:00) (102/53 - 161/67)  BP(mean): 107 (28 Aug 2017 15:00) (68 - 117)  RR: 19 (28 Aug 2017 15:00) (7 - 28)  SpO2: 99% (28 Aug 2017 15:00) (72% - 100%)  CAPILLARY BLOOD GLUCOSE  110 (28 Aug 2017 08:00)  122 (27 Aug 2017 22:00)  137 (27 Aug 2017 17:00)        I&O's Summary    27 Aug 2017 07:01  -  28 Aug 2017 07:00  --------------------------------------------------------  IN: 967.8 mL / OUT: 1065 mL / NET: -97.2 mL    28 Aug 2017 07:01  -  28 Aug 2017 16:21  --------------------------------------------------------  IN: 58 mL / OUT: 400 mL / NET: -342 mL        PHYSICAL EXAM:  GENERAL: NAD, well-developed  HEAD:  Atraumatic, Normocephalic  EYES: EOMI, PERRLA, conjunctiva and sclera clear  NECK: Supple, No JVD  CHEST/LUNG: Clear to auscultation bilaterally; No wheeze  HEART: Regular rate and rhythm; No murmurs, rubs, or gallops  ABDOMEN: Soft, Nontender, Nondistended; Bowel sounds present  EXTREMITIES:  2+ Peripheral Pulses, No clubbing, cyanosis, or edema  PSYCH: AAOx3  NEUROLOGY: non-focal  SKIN: No rashes or lesions    LABS:                        9.2    21.2  )-----------( 369      ( 28 Aug 2017 01:14 )             28.4     08-28    140  |  104  |  9   ----------------------------<  133<H>  4.0   |  27  |  0.35<L>    Ca    8.2<L>      28 Aug 2017 01:14  Phos  1.9     08-27    TPro  5.1<L>  /  Alb  2.5<L>  /  TBili  0.4  /  DBili  x   /  AST  22  /  ALT  18  /  AlkPhos  62  08-27    PT/INR - ( 28 Aug 2017 08:10 )   PT: 13.3 sec;   INR: 1.23 ratio         PTT - ( 28 Aug 2017 08:10 )  PTT:33.8 sec          RADIOLOGY & ADDITIONAL TESTS:    Imaging Personally Reviewed:    Consultant(s) Notes Reviewed:      Care Discussed with Consultants/Other Providers:

## 2017-08-28 NOTE — PROGRESS NOTE ADULT - SUBJECTIVE AND OBJECTIVE BOX
CARDIOLOGY FOLLOW UP NOTE - DR. PEÑA    Subjective:    no chest pain/sob    PHYSICAL EXAM:  T(C): 36.7 (08-28-17 @ 12:00), Max: 36.7 (08-28-17 @ 12:00)  HR: 125 (08-28-17 @ 12:00) (65 - 138)  BP: 146/101 (08-28-17 @ 12:00) (102/53 - 161/67)  RR: 14 (08-28-17 @ 12:00) (7 - 28)  SpO2: 99% (08-28-17 @ 12:00) (72% - 100%)  Wt(kg): --  I&O's Summary    27 Aug 2017 07:01  -  28 Aug 2017 07:00  --------------------------------------------------------  IN: 967.8 mL / OUT: 1065 mL / NET: -97.2 mL    28 Aug 2017 07:01  -  28 Aug 2017 13:46  --------------------------------------------------------  IN: 31 mL / OUT: 400 mL / NET: -369 mL        Appearance: Normal	  Cardiovascular: Normal S1 S2,RRR, No JVD, No murmurs  Respiratory: Lungs clear to auscultation s/p or, dec bs bases b/l  Gastrointestinal:  Soft, Non-tender, + BS	  Extremities: Normal range of motion, No edema    MEDICATIONS  (STANDING):  atorvastatin 40 milliGRAM(s) Oral at bedtime  aspirin  chewable 81 milliGRAM(s) Oral daily  docusate sodium 100 milliGRAM(s) Oral three times a day  meropenem IVPB   IV Intermittent   meropenem IVPB 1000 milliGRAM(s) IV Intermittent every 8 hours  dextrose 5%. 1000 milliLiter(s) (50 mL/Hr) IV Continuous <Continuous>  heparin  Infusion 700 Unit(s)/Hr (9 mL/Hr) IV Continuous <Continuous>  metoprolol 25 milliGRAM(s) Oral every 12 hours  famotidine    Tablet 20 milliGRAM(s) Oral daily      TELEMETRY: 	    ECG:  	  RADIOLOGY:   DIAGNOSTIC TESTING:  [ ] Echocardiogram:  [ ] Catheterization:  [ ] Stress Test:    OTHER: 	    LABS:	 	    CARDIAC MARKERS:                                9.2    21.2  )-----------( 369      ( 28 Aug 2017 01:14 )             28.4     08-28    140  |  104  |  9   ----------------------------<  133<H>  4.0   |  27  |  0.35<L>    Ca    8.2<L>      28 Aug 2017 01:14  Phos  1.9     08-27    TPro  5.1<L>  /  Alb  2.5<L>  /  TBili  0.4  /  DBili  x   /  AST  22  /  ALT  18  /  AlkPhos  62  08-27    proBNP:   PT/INR - ( 28 Aug 2017 08:10 )   PT: 13.3 sec;   INR: 1.23 ratio         PTT - ( 28 Aug 2017 08:10 )  PTT:33.8 sec  Lipid Profile:   HgA1c:

## 2017-08-28 NOTE — DIETITIAN INITIAL EVALUATION ADULT. - NS FNS REASON FOR WEIGHT CHANG
Pt stats 4 months ago at a  appointment she was 120lbs, Pt states she lost 15lbs x3 weeks. however on amdission Pt's standing Wt was 112.6lbs indicating a 8lbs Wt loss. Pt us currently 139.7lbs, gain likely related to fluid shifts intraoperatively./decreased po intake

## 2017-08-28 NOTE — DIETITIAN INITIAL EVALUATION ADULT. - ORAL INTAKE PTA
fair/Pt states a decline in PO intake PTA. States she and her daughter preform food shopping and cooking. Breakfast: cereal or english muffin. Lunch: skips. Dinner: Meat, potato, vegetables. Pt snacks on Oreo's. Pt drinks water.

## 2017-08-28 NOTE — PROGRESS NOTE ADULT - PROBLEM SELECTOR PLAN 1
s/p VATS.  -Pain management with percocet/APAP; confusion/delerium on precedex, re-orient as needed.  -Monitor chest tube output; management per thoracic surgery  -F/U OR cultures, currently with GNR on meropenum, ID following, appreciate recommendations  -Prophylaxis: DVT-Systemic anticoagulation, venodynes; GI-Protonix  -Glucose control with  (A1C 6%) with insulin sliding scale  -Resume appropriate home medications  -PT; OOBTC, progressive ambulation

## 2017-08-29 LAB
ANION GAP SERPL CALC-SCNC: 14 MMOL/L — SIGNIFICANT CHANGE UP (ref 5–17)
APPEARANCE UR: CLEAR — SIGNIFICANT CHANGE UP
APTT BLD: 33.1 SEC — SIGNIFICANT CHANGE UP (ref 27.5–37.4)
APTT BLD: 34 SEC — SIGNIFICANT CHANGE UP (ref 27.5–37.4)
APTT BLD: 64.4 SEC — HIGH (ref 27.5–37.4)
BILIRUB UR-MCNC: NEGATIVE — SIGNIFICANT CHANGE UP
BUN SERPL-MCNC: 10 MG/DL — SIGNIFICANT CHANGE UP (ref 7–23)
CALCIUM SERPL-MCNC: 9.1 MG/DL — SIGNIFICANT CHANGE UP (ref 8.4–10.5)
CHLORIDE SERPL-SCNC: 103 MMOL/L — SIGNIFICANT CHANGE UP (ref 96–108)
CO2 SERPL-SCNC: 25 MMOL/L — SIGNIFICANT CHANGE UP (ref 22–31)
COLOR SPEC: YELLOW — SIGNIFICANT CHANGE UP
CREAT SERPL-MCNC: 0.43 MG/DL — LOW (ref 0.5–1.3)
CULTURE RESULTS: SIGNIFICANT CHANGE UP
DIFF PNL FLD: ABNORMAL
GLUCOSE SERPL-MCNC: 146 MG/DL — HIGH (ref 70–99)
GLUCOSE UR QL: NEGATIVE — SIGNIFICANT CHANGE UP
HCT VFR BLD CALC: 29.6 % — LOW (ref 34.5–45)
HCT VFR BLD CALC: 34.5 % — SIGNIFICANT CHANGE UP (ref 34.5–45)
HCT VFR BLD CALC: 34.6 % — SIGNIFICANT CHANGE UP (ref 34.5–45)
HGB BLD-MCNC: 11.3 G/DL — LOW (ref 11.5–15.5)
HGB BLD-MCNC: 11.5 G/DL — SIGNIFICANT CHANGE UP (ref 11.5–15.5)
HGB BLD-MCNC: 9.6 G/DL — LOW (ref 11.5–15.5)
INR BLD: 1.3 RATIO — HIGH (ref 0.88–1.16)
KETONES UR-MCNC: NEGATIVE — SIGNIFICANT CHANGE UP
LEUKOCYTE ESTERASE UR-ACNC: NEGATIVE — SIGNIFICANT CHANGE UP
MCHC RBC-ENTMCNC: 30.4 PG — SIGNIFICANT CHANGE UP (ref 27–34)
MCHC RBC-ENTMCNC: 30.7 PG — SIGNIFICANT CHANGE UP (ref 27–34)
MCHC RBC-ENTMCNC: 30.7 PG — SIGNIFICANT CHANGE UP (ref 27–34)
MCHC RBC-ENTMCNC: 32.4 GM/DL — SIGNIFICANT CHANGE UP (ref 32–36)
MCHC RBC-ENTMCNC: 32.8 GM/DL — SIGNIFICANT CHANGE UP (ref 32–36)
MCHC RBC-ENTMCNC: 33.2 GM/DL — SIGNIFICANT CHANGE UP (ref 32–36)
MCV RBC AUTO: 92.7 FL — SIGNIFICANT CHANGE UP (ref 80–100)
MCV RBC AUTO: 93.7 FL — SIGNIFICANT CHANGE UP (ref 80–100)
MCV RBC AUTO: 93.8 FL — SIGNIFICANT CHANGE UP (ref 80–100)
NITRITE UR-MCNC: NEGATIVE — SIGNIFICANT CHANGE UP
PH UR: 6.5 — SIGNIFICANT CHANGE UP (ref 5–8)
PLATELET # BLD AUTO: 447 K/UL — HIGH (ref 150–400)
PLATELET # BLD AUTO: 473 K/UL — HIGH (ref 150–400)
PLATELET # BLD AUTO: 484 K/UL — HIGH (ref 150–400)
POTASSIUM SERPL-MCNC: 3.8 MMOL/L — SIGNIFICANT CHANGE UP (ref 3.5–5.3)
POTASSIUM SERPL-SCNC: 3.8 MMOL/L — SIGNIFICANT CHANGE UP (ref 3.5–5.3)
PROT UR-MCNC: 30 MG/DL
PROTHROM AB SERPL-ACNC: 14.3 SEC — HIGH (ref 9.8–12.7)
RBC # BLD: 3.15 M/UL — LOW (ref 3.8–5.2)
RBC # BLD: 3.68 M/UL — LOW (ref 3.8–5.2)
RBC # BLD: 3.73 M/UL — LOW (ref 3.8–5.2)
RBC # FLD: 12.8 % — SIGNIFICANT CHANGE UP (ref 10.3–14.5)
RBC # FLD: 12.9 % — SIGNIFICANT CHANGE UP (ref 10.3–14.5)
RBC # FLD: 13 % — SIGNIFICANT CHANGE UP (ref 10.3–14.5)
SODIUM SERPL-SCNC: 142 MMOL/L — SIGNIFICANT CHANGE UP (ref 135–145)
SP GR SPEC: 1.01 — LOW (ref 1.01–1.02)
SPECIMEN SOURCE: SIGNIFICANT CHANGE UP
UROBILINOGEN FLD QL: 4
WBC # BLD: 19.8 K/UL — HIGH (ref 3.8–10.5)
WBC # BLD: 25.2 K/UL — HIGH (ref 3.8–10.5)
WBC # BLD: 26.8 K/UL — HIGH (ref 3.8–10.5)
WBC # FLD AUTO: 19.8 K/UL — HIGH (ref 3.8–10.5)
WBC # FLD AUTO: 25.2 K/UL — HIGH (ref 3.8–10.5)
WBC # FLD AUTO: 26.8 K/UL — HIGH (ref 3.8–10.5)

## 2017-08-29 PROCEDURE — 71010: CPT | Mod: 26,76

## 2017-08-29 PROCEDURE — 99232 SBSQ HOSP IP/OBS MODERATE 35: CPT

## 2017-08-29 PROCEDURE — 93010 ELECTROCARDIOGRAM REPORT: CPT

## 2017-08-29 RX ORDER — METOPROLOL TARTRATE 50 MG
5 TABLET ORAL ONCE
Qty: 0 | Refills: 0 | Status: COMPLETED | OUTPATIENT
Start: 2017-08-29 | End: 2017-08-29

## 2017-08-29 RX ORDER — POLYETHYLENE GLYCOL 3350 17 G/17G
17 POWDER, FOR SOLUTION ORAL
Qty: 0 | Refills: 0 | Status: DISCONTINUED | OUTPATIENT
Start: 2017-08-29 | End: 2017-08-29

## 2017-08-29 RX ORDER — METOPROLOL TARTRATE 50 MG
50 TABLET ORAL
Qty: 0 | Refills: 0 | Status: DISCONTINUED | OUTPATIENT
Start: 2017-08-29 | End: 2017-09-06

## 2017-08-29 RX ORDER — HEPARIN SODIUM 5000 [USP'U]/ML
11 INJECTION INTRAVENOUS; SUBCUTANEOUS
Qty: 25000 | Refills: 0 | Status: DISCONTINUED | OUTPATIENT
Start: 2017-08-29 | End: 2017-08-29

## 2017-08-29 RX ORDER — HEPARIN SODIUM 5000 [USP'U]/ML
1100 INJECTION INTRAVENOUS; SUBCUTANEOUS
Qty: 25000 | Refills: 0 | Status: DISCONTINUED | OUTPATIENT
Start: 2017-08-29 | End: 2017-08-29

## 2017-08-29 RX ORDER — DILTIAZEM HCL 120 MG
5 CAPSULE, EXT RELEASE 24 HR ORAL
Qty: 125 | Refills: 0 | Status: DISCONTINUED | OUTPATIENT
Start: 2017-08-29 | End: 2017-08-29

## 2017-08-29 RX ORDER — HEPARIN SODIUM 5000 [USP'U]/ML
1100 INJECTION INTRAVENOUS; SUBCUTANEOUS
Qty: 25000 | Refills: 0 | Status: DISCONTINUED | OUTPATIENT
Start: 2017-08-29 | End: 2017-08-30

## 2017-08-29 RX ORDER — DILTIAZEM HCL 120 MG
10 CAPSULE, EXT RELEASE 24 HR ORAL
Qty: 125 | Refills: 0 | Status: DISCONTINUED | OUTPATIENT
Start: 2017-08-29 | End: 2017-08-30

## 2017-08-29 RX ORDER — HEPARIN SODIUM 5000 [USP'U]/ML
1200 INJECTION INTRAVENOUS; SUBCUTANEOUS
Qty: 25000 | Refills: 0 | Status: DISCONTINUED | OUTPATIENT
Start: 2017-08-29 | End: 2017-08-29

## 2017-08-29 RX ADMIN — Medication 50 MILLIGRAM(S): at 18:08

## 2017-08-29 RX ADMIN — Medication 5 MG/HR: at 04:13

## 2017-08-29 RX ADMIN — ATORVASTATIN CALCIUM 40 MILLIGRAM(S): 80 TABLET, FILM COATED ORAL at 21:52

## 2017-08-29 RX ADMIN — MEROPENEM 200 MILLIGRAM(S): 1 INJECTION INTRAVENOUS at 22:01

## 2017-08-29 RX ADMIN — HEPARIN SODIUM 12 UNIT(S)/HR: 5000 INJECTION INTRAVENOUS; SUBCUTANEOUS at 13:30

## 2017-08-29 RX ADMIN — FAMOTIDINE 20 MILLIGRAM(S): 10 INJECTION INTRAVENOUS at 13:03

## 2017-08-29 RX ADMIN — ATORVASTATIN CALCIUM 40 MILLIGRAM(S): 80 TABLET, FILM COATED ORAL at 01:26

## 2017-08-29 RX ADMIN — OXYCODONE AND ACETAMINOPHEN 2 TABLET(S): 5; 325 TABLET ORAL at 03:08

## 2017-08-29 RX ADMIN — Medication 5 MILLIGRAM(S): at 01:36

## 2017-08-29 RX ADMIN — Medication 25 MILLIGRAM(S): at 05:59

## 2017-08-29 RX ADMIN — MEROPENEM 200 MILLIGRAM(S): 1 INJECTION INTRAVENOUS at 15:40

## 2017-08-29 RX ADMIN — OXYCODONE AND ACETAMINOPHEN 2 TABLET(S): 5; 325 TABLET ORAL at 03:45

## 2017-08-29 RX ADMIN — Medication 10 MILLIGRAM(S): at 09:15

## 2017-08-29 RX ADMIN — Medication 81 MILLIGRAM(S): at 13:03

## 2017-08-29 RX ADMIN — Medication 5 MILLIGRAM(S): at 03:15

## 2017-08-29 RX ADMIN — HEPARIN SODIUM 11 UNIT(S)/HR: 5000 INJECTION INTRAVENOUS; SUBCUTANEOUS at 13:16

## 2017-08-29 RX ADMIN — MEROPENEM 200 MILLIGRAM(S): 1 INJECTION INTRAVENOUS at 05:51

## 2017-08-29 RX ADMIN — POLYETHYLENE GLYCOL 3350 17 GRAM(S): 17 POWDER, FOR SOLUTION ORAL at 10:44

## 2017-08-29 RX ADMIN — Medication 10 MG/HR: at 18:46

## 2017-08-29 RX ADMIN — HEPARIN SODIUM 11 UNIT(S)/HR: 5000 INJECTION INTRAVENOUS; SUBCUTANEOUS at 04:39

## 2017-08-29 RX ADMIN — HEPARIN SODIUM 11 UNIT(S)/HR: 5000 INJECTION INTRAVENOUS; SUBCUTANEOUS at 21:51

## 2017-08-29 RX ADMIN — Medication 10 MG/HR: at 05:31

## 2017-08-29 NOTE — PROGRESS NOTE ADULT - PROBLEM SELECTOR PLAN 1
Continue current antibiotics, f/u cx/sensitivity. ID following.  DC right pleural CT #2 today. F/U CXR.  Continue chest tubes to low-continuous wall suction. Daily CXR.  Cough and deep breathe, Incentive Spirometry Q1h, Chest PT.   Ambulate 4x daily as tolerated and with PT.   Continue care per primary team.

## 2017-08-29 NOTE — PHYSICAL THERAPY INITIAL EVALUATION ADULT - ACTIVE RANGE OF MOTION EXAMINATION, REHAB EVAL
LUE not tested/Right UE Active ROM was WFL (within functional limits)/bilateral  lower extremity Active ROM was WFL (within functional limits)

## 2017-08-29 NOTE — PROGRESS NOTE ADULT - SUBJECTIVE AND OBJECTIVE BOX
Follow Up:      Interval History/ROS:Patient is a 72y old  Female who presents with a chief complaint of Afib (22 Aug 2017 20:06)    rapid Afib overnight, now on cardizem gtt.  no fevers, but WBC increased from 21 --> 26.  no new or localizing symptoms.  1 chest tube removed    Allergies    No Known Allergies      ANTIMICROBIALS:    meropenem IVPB 1000 every 8 hours      OTHER MEDS:  atorvastatin 40 milliGRAM(s) Oral at bedtime  aspirin  chewable 81 milliGRAM(s) Oral daily  oxyCODONE    5 mG/acetaminophen 325 mG 2 Tablet(s) Oral every 4 hours PRN  oxyCODONE    5 mG/acetaminophen 325 mG 1 Tablet(s) Oral every 4 hours PRN  famotidine    Tablet 20 milliGRAM(s) Oral daily  diltiazem Infusion 10 mG/Hr IV Continuous <Continuous>  polyethylene glycol 3350 17 Gram(s) Oral every 1 hour  heparin  Infusion 1200 Unit(s)/Hr IV Continuous <Continuous>  metoprolol 50 milliGRAM(s) Oral two times a day  bisacodyl Suppository 10 milliGRAM(s) Rectal once      Vital Signs Last 24 Hrs  T(C): 36.8 (29 Aug 2017 14:10), Max: 36.8 (29 Aug 2017 14:10)  T(F): 98.3 (29 Aug 2017 14:10), Max: 98.3 (29 Aug 2017 14:10)  HR: 72 (29 Aug 2017 14:10) (72 - 162)  BP: 144/70 (29 Aug 2017 14:10) (132/83 - 182/71)  BP(mean): 107 (28 Aug 2017 15:00) (107 - 107)  RR: 16 (29 Aug 2017 14:10) (16 - 22)  SpO2: 97% (29 Aug 2017 14:10) (94% - 99%)    PHYSICAL EXAM:    Constitutional: non-toxic    Eyes: non-icteric    ENMT: poor/missing dentition    Neck: no NISREEN    Respiratory: decreased BS on R    Cardiovascular: S1/S2, no murmur, chest tube x 2    Gastrointestinal: soft, NT/ND, normal BS    Genitourinary: no maxwell    Vascular: no edema    MSK: LUE bandaged    Skin: scattered ecchymosis    Psychiatric: alert and oriented, affect appropriate    Lines: PIV                          11.5   25.2  )-----------( 484      ( 29 Aug 2017 13:00 )             34.6       08-29    142  |  103  |  10  ----------------------------<  146<H>  3.8   |  25  |  0.43<L>    Ca    9.1      29 Aug 2017 02:25      MICROBIOLOGY:  RECENT CULTURES:    blood cultures x 2 8/29 in lab    Culture - Blood (08.26.17 @ 05:49)    Specimen Source: .Blood Blood-Peripheral    Culture Results:   No growth to date.    Culture - Blood (08.26.17 @ 02:31)    Specimen Source: .Blood Blood-Peripheral    Culture Results:   No growth to date.      RADIOLOGY:    < from: Xray Chest 1 View AP- PORTABLE-Urgent (08.28.17 @ 14:35) >  IMPRESSION:     1. Unchanged small right pneumothorax  2. Unchanged right lower lobe pneumonia and/or atelectasis  3. Small bilateral pleural effusions.        < end of copied text >

## 2017-08-29 NOTE — PROGRESS NOTE ADULT - ASSESSMENT
72 year old female with htn, fracture of Left arm, GERD, HLD admitted for new onset  Afib with RVR/ PNA/ s/p thoracotomy for empyema         1. Cv stable post op  currently Afib with RVR  cardizem gtt adjust to 10ml/hr today per primary team   continue with BB   cont a/c hep gtt for af , currently on hold for episode of hematuria   pending repeat CBC   continue with ASA     2. Empyema  IV abx per id  chest tube continue  pending chest xray   care per Thoracic surgery   pulm f.u       DVT ppx 72 year old female with htn, fracture of Left arm, GERD, HLD admitted for new onset  Afib with RVR/ PNA/ s/p thoracotomy for empyema         1. Cv stable post op  currently Afib with RVR  cardizem gtt adjust to 10ml/hr today   recommend to increase metoprolol to 50mg BID for better rate control   cont a/c hep gtt for af   pending repeat CBC   continue with ASA     2. Empyema  IV abx per id  chest tube continue  pending chest xray   care per Thoracic surgery   pulm f.u       DVT ppx

## 2017-08-29 NOTE — PROGRESS NOTE ADULT - SUBJECTIVE AND OBJECTIVE BOX
CARDIOLOGY FOLLOW UP - Dr. Grant    CC in NAD   no chest pain       PHYSICAL EXAM:  T(C): 36.6 (08-29-17 @ 05:00), Max: 36.6 (08-28-17 @ 16:20)  HR: 140 (08-29-17 @ 05:00) (95 - 162)  BP: 145/56 (08-29-17 @ 05:00) (132/83 - 182/71)  RR: 20 (08-29-17 @ 05:00) (15 - 22)  SpO2: 94% (08-29-17 @ 05:00) (94% - 100%)  Wt(kg): --  I&O's Summary    28 Aug 2017 07:01  -  29 Aug 2017 07:00  --------------------------------------------------------  IN: 684 mL / OUT: 700 mL / NET: -16 mL        Appearance: NAD 	  Cardiovascular: Normal S1 S2, irregular   Respiratory: + rales   Gastrointestinal:  Soft, Non-tender, + BS	  Extremities: Normal range of motion, No clubbing, cyanosis or edema        MEDICATIONS  (STANDING):  atorvastatin 40 milliGRAM(s) Oral at bedtime  aspirin  chewable 81 milliGRAM(s) Oral daily  meropenem IVPB   IV Intermittent   meropenem IVPB 1000 milliGRAM(s) IV Intermittent every 8 hours  metoprolol 25 milliGRAM(s) Oral every 12 hours  famotidine    Tablet 20 milliGRAM(s) Oral daily  heparin  Infusion 1100 Unit(s)/Hr (11 mL/Hr) IV Continuous <Continuous>  diltiazem Infusion 10 mG/Hr (10 mL/Hr) IV Continuous <Continuous>  polyethylene glycol 3350 17 Gram(s) Oral every 1 hour      TELEMETRY: 	  Afib HR 90 - 130s   ECG:  	  RADIOLOGY:   DIAGNOSTIC TESTING:  [ ] Echocardiogram:  [ ]  Catheterization:  [ ] Stress Test:    OTHER: 	    LABS:	 	                                11.3   26.8  )-----------( 447      ( 29 Aug 2017 02:25 )             34.5     08-29    142  |  103  |  10  ----------------------------<  146<H>  3.8   |  25  |  0.43<L>    Ca    9.1      29 Aug 2017 02:25      PT/INR - ( 29 Aug 2017 02:26 )   PT: 14.3 sec;   INR: 1.30 ratio         PTT - ( 29 Aug 2017 11:55 )  PTT:33.1 sec CARDIOLOGY FOLLOW UP - Dr. Grant    CC in NAD   no chest pain   or sob     PHYSICAL EXAM:  T(C): 36.6 (08-29-17 @ 05:00), Max: 36.6 (08-28-17 @ 16:20)  HR: 140 (08-29-17 @ 05:00) (95 - 162)  BP: 145/56 (08-29-17 @ 05:00) (132/83 - 182/71)  RR: 20 (08-29-17 @ 05:00) (15 - 22)  SpO2: 94% (08-29-17 @ 05:00) (94% - 100%)  Wt(kg): --  I&O's Summary    28 Aug 2017 07:01  -  29 Aug 2017 07:00  --------------------------------------------------------  IN: 684 mL / OUT: 700 mL / NET: -16 mL        Appearance: NAD 	  Cardiovascular: Normal S1 S2, irregular   Respiratory: + rales R>L   Gastrointestinal:  Soft, Non-tender, + BS	  Extremities: + 1 bl LE edema        MEDICATIONS  (STANDING):  atorvastatin 40 milliGRAM(s) Oral at bedtime  aspirin  chewable 81 milliGRAM(s) Oral daily  meropenem IVPB   IV Intermittent   meropenem IVPB 1000 milliGRAM(s) IV Intermittent every 8 hours  metoprolol 25 milliGRAM(s) Oral every 12 hours  famotidine    Tablet 20 milliGRAM(s) Oral daily  heparin  Infusion 1100 Unit(s)/Hr (11 mL/Hr) IV Continuous <Continuous>  diltiazem Infusion 10 mG/Hr (10 mL/Hr) IV Continuous <Continuous>  polyethylene glycol 3350 17 Gram(s) Oral every 1 hour      TELEMETRY: 	  Afib HR 90 - 130s   ECG:  	  RADIOLOGY:   DIAGNOSTIC TESTING:  [ ] Echocardiogram:  [ ]  Catheterization:  [ ] Stress Test:    OTHER: 	    LABS:	 	                                11.3   26.8  )-----------( 447      ( 29 Aug 2017 02:25 )             34.5     08-29    142  |  103  |  10  ----------------------------<  146<H>  3.8   |  25  |  0.43<L>    Ca    9.1      29 Aug 2017 02:25      PT/INR - ( 29 Aug 2017 02:26 )   PT: 14.3 sec;   INR: 1.30 ratio         PTT - ( 29 Aug 2017 11:55 )  PTT:33.1 sec

## 2017-08-29 NOTE — PHYSICAL THERAPY INITIAL EVALUATION ADULT - ADDITIONAL COMMENTS
(cont from above):8/23:CTAngioChest:No evidence of pulmonary embolism.Large multiloculated R pleural effusion with pleural thickening, concerning for possible infectious or neoplastic etiology. Compressive partial atelectasis of the R lower and middle lobes.Small L pleural effusion with associated compressive atelectasis of the L lower lobe.Patchy groundglass opacity in the L upper lobes which may be infectious or related to pulmonary edema given the presence of pleural effusion. Partially visualized acute comminuted spiral fracture of the L humeral mid to distal shaft. USChest:Complex R lower hemithorax collection. 8/28CXR:Unchanged small R pneumothorax. Unchanged Rlower lobe pneumonia and/or atelectasis. Small bilateral pleural effusions.

## 2017-08-29 NOTE — PHYSICAL THERAPY INITIAL EVALUATION ADULT - LIVES WITH, PROFILE
children/pt lives in a house with daughter, 2 steps to enter, 6 steps to bedroom.  patient reports stated she was independent with ADLs and self care prior to LUE fx, reprots recently daughter has been assisting her with ADL's.

## 2017-08-29 NOTE — PHYSICAL THERAPY INITIAL EVALUATION ADULT - CRITERIA FOR SKILLED THERAPEUTIC INTERVENTIONS
anticipated discharge recommendation/risk reduction/prevention/impairments found/predicted duration of therapy intervention/functional limitations in following categories

## 2017-08-29 NOTE — PROGRESS NOTE ADULT - ASSESSMENT
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 - procedure had to be converted to formal thoracotomy due to dense adhesions and subpulmonic location of process    post-op confusion - r/o bacteremia/CNS event on A/C (much less likely/med related    recurrent atrial fibrillation with RVR now with rate control on IV cardizem and oral beta blockers    constipation    right humeral fracture    PLAN/RECOMMENDATIONS    oxygen supplementation to keep saturation greater than 92%  blood cultures  miralax  on meropenem - ID evaluation for deescalation of antibiotics (?) unasyn - will need a three week course from the time of surgery - PICC line  orthopedic follow-up - it is unclear when the humeral fracture can be repaired (ie does the full course of antibiotics need to be completed)  pain control/incentive spirometry  chest tube to low wall suction - follow air leak and CXR  AF rate control  - cardizem IV/oral beta blockers/restarted on full dose heparin gtt - watch for post-operative bleeding - GI prophylaxis on A/C        Will follow with you;     Truman Arellano MD, [facility] - [phone]  Pulmonary Medicine 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 - procedure had to be converted to formal thoracotomy due to dense adhesions and subpulmonic location of process    post-op confusion - r/o bacteremia/CNS event on A/C (much less likely/med related)    recurrent atrial fibrillation with RVR now with rate control on IV cardizem and oral beta blockers    constipation    left humeral fracture    PLAN/RECOMMENDATIONS    oxygen supplementation to keep saturation greater than 92%  blood cultures  miralax  on meropenem - ID evaluation for deescalation of antibiotics (?) unasyn - will need a three week course from the time of surgery - PICC line  orthopedic follow-up - it is unclear when the humeral fracture can be repaired (ie does the full course of antibiotics need to be completed)  pain control/incentive spirometry  chest tube to low wall suction - follow air leak and CXR  AF rate control  - cardizem IV/oral beta blockers/restarted on full dose heparin gtt - watch for post-operative bleeding - GI prophylaxis on A/C        Will follow with you;     Truman Arellano MD, [facility] - [phone]  Pulmonary Medicine

## 2017-08-29 NOTE — PROGRESS NOTE ADULT - SUBJECTIVE AND OBJECTIVE BOX
Patient is a 72y old  Female who presents with a chief complaint of Afib (22 Aug 2017 20:06)      SUBJECTIVE / OVERNIGHT EVENTS: c/o abd pain, no bm x 2 days. No chest pain. No shortness of breath. No other complaints. No events overnight.     MEDICATIONS  (STANDING):  atorvastatin 40 milliGRAM(s) Oral at bedtime  aspirin  chewable 81 milliGRAM(s) Oral daily  meropenem IVPB   IV Intermittent   meropenem IVPB 1000 milliGRAM(s) IV Intermittent every 8 hours  metoprolol 25 milliGRAM(s) Oral every 12 hours  famotidine    Tablet 20 milliGRAM(s) Oral daily  diltiazem Infusion 10 mG/Hr (10 mL/Hr) IV Continuous <Continuous>  polyethylene glycol 3350 17 Gram(s) Oral every 1 hour  heparin  Infusion 1200 Unit(s)/Hr (12 mL/Hr) IV Continuous <Continuous>  bisacodyl Suppository 10 milliGRAM(s) Rectal once    MEDICATIONS  (PRN):  oxyCODONE    5 mG/acetaminophen 325 mG 2 Tablet(s) Oral every 4 hours PRN Severe Pain (7 - 10)  oxyCODONE    5 mG/acetaminophen 325 mG 1 Tablet(s) Oral every 4 hours PRN Moderate Pain (4 - 6)      Vital Signs Last 24 Hrs  T(C): 36.6 (29 Aug 2017 05:00), Max: 36.6 (28 Aug 2017 16:20)  T(F): 97.9 (29 Aug 2017 05:00), Max: 97.9 (28 Aug 2017 16:20)  HR: 140 (29 Aug 2017 05:00) (95 - 162)  BP: 145/56 (29 Aug 2017 05:00) (132/83 - 182/71)  BP(mean): 107 (28 Aug 2017 15:00) (90 - 107)  RR: 20 (29 Aug 2017 05:00) (15 - 22)  SpO2: 94% (29 Aug 2017 05:00) (94% - 100%)  CAPILLARY BLOOD GLUCOSE        I&O's Summary    28 Aug 2017 07:01  -  29 Aug 2017 07:00  --------------------------------------------------------  IN: 684 mL / OUT: 700 mL / NET: -16 mL    29 Aug 2017 07:01  -  29 Aug 2017 13:33  --------------------------------------------------------  IN: 0 mL / OUT: 80 mL / NET: -80 mL        PHYSICAL EXAM:  GENERAL: NAD, well-developed  HEAD:  Atraumatic, Normocephalic  EYES: EOMI, PERRLA, conjunctiva and sclera clear  NECK: Supple, No JVD  CHEST/LUNG: Clear to auscultation bilaterally; No wheeze  HEART: Regular rate and rhythm; No murmurs, rubs, or gallops  ABDOMEN: Soft, Nontender, Nondistended; Bowel sounds present  EXTREMITIES:  2+ Peripheral Pulses, No clubbing, cyanosis, or edema  PSYCH: AAOx3  NEUROLOGY: non-focal  SKIN: No rashes or lesions    LABS:                        11.5   25.2  )-----------( 484      ( 29 Aug 2017 13:00 )             34.6     08-29    142  |  103  |  10  ----------------------------<  146<H>  3.8   |  25  |  0.43<L>    Ca    9.1      29 Aug 2017 02:25      PT/INR - ( 29 Aug 2017 02:26 )   PT: 14.3 sec;   INR: 1.30 ratio         PTT - ( 29 Aug 2017 11:55 )  PTT:33.1 sec          RADIOLOGY & ADDITIONAL TESTS:    Imaging Personally Reviewed:    Consultant(s) Notes Reviewed:      Care Discussed with Consultants/Other Providers:

## 2017-08-29 NOTE — PROGRESS NOTE ADULT - SUBJECTIVE AND OBJECTIVE BOX
[facility]     PROGRESS NOTE    CHIEF COMPLAINT: empyema    INTERVAL HISTORY: out of bed to chair; quite confused and diaphoretic; abdominal discomfort with constipation; AF with RVR overnight restarted on cardizem gtt;     REVIEW OF SYSTEMS:  Constitutional: As per interval history  HEENT: Within normal limits  CV: As per interval history  Resp: As per interval history  GI: As per interval history  : Within normal limits  Musculoskeletal: Within normal limits  Skin: Within normal limits  Neurological: As per interval history  Psychiatric: Within normal limits  Endocrine: Within normal limits  Hematologic/Lymphatic: Within normal limits  Allergic/Immunologic: Within normal limits    MEDICATIONS:     Pulmonary "      Anti-microbials:  meropenem IVPB   IV Intermittent   meropenem IVPB 1000 milliGRAM(s) IV Intermittent every 8 hours      Cardiovascular:  metoprolol 25 milliGRAM(s) Oral every 12 hours  diltiazem Infusion 10 mG/Hr IV Continuous <Continuous>      Other:  atorvastatin 40 milliGRAM(s) Oral at bedtime  aspirin  chewable 81 milliGRAM(s) Oral daily  oxyCODONE    5 mG/acetaminophen 325 mG 2 Tablet(s) Oral every 4 hours PRN  oxyCODONE    5 mG/acetaminophen 325 mG 1 Tablet(s) Oral every 4 hours PRN  famotidine    Tablet 20 milliGRAM(s) Oral daily  heparin  Infusion 1100 Unit(s)/Hr IV Continuous <Continuous>        OBJECTIVE:        I&O's Detail    28 Aug 2017 07:01  -  29 Aug 2017 07:00  --------------------------------------------------------  IN:    diltiazem Infusion: 5 mL    diltiazem Infusion: 30 mL    heparin Infusion: 44 mL    heparin Infusion: 85 mL    IV PiggyBack: 200 mL    Oral Fluid: 320 mL  Total IN: 684 mL    OUT:    Voided: 700 mL  Total OUT: 700 mL    Total NET: -16 mL      Daily Weight in k (28 Aug 2017 14:18)    CAPILLARY BLOOD GLUCOSE      PHYSICAL EXAM:       ICU Vital Signs Last 24 Hrs  T(C): 36.6 (29 Aug 2017 05:00), Max: 36.7 (28 Aug 2017 12:00)  T(F): 97.9 (29 Aug 2017 05:00), Max: 98 (28 Aug 2017 12:00)  HR: 140 (29 Aug 2017 05:00) (95 - 162)  BP: 145/56 (29 Aug 2017 05:00) (132/83 - 182/71)  BP(mean): 107 (28 Aug 2017 15:00) (87 - 117)  ABP: --  ABP(mean): --  RR: 20 (29 Aug 2017 05:00) (14 - 22)  SpO2: 94% (29 Aug 2017 05:00) (94% - 100%) on 2lpm     General: Confused. Diaphoretic. No distress. Appears stated age 	  HEENT:   AT/NC/Anicteric. PERRL/EOMI. Normal oral mucosa,  Neck: Supple. Trachea midline. Thyroid without enlargement/tenderness/nodules. No carotid bruit. No JVD	  Cardiovascular: Irregularly irregular rate and rhythm. Tachycardic S1 S2 normal. II/VI systolic murmur  Respiratory: Respirations unlabored. Diffuse right sided rales ~ 2/3 up - 3 chest tubes in place; anterior tube with air leak  Abdomen: Soft. Non-tender. Non-distended. No organomegaly. No masses. Normal BS  Extremities: Warm to touch. Mild lower extremity and LUE edema; bilateral SCDs. LUE in hard brace  Pulses: 2+ peripheral pulses all extremities	  Skin: Thoracotomy incision bandaged - C/D/I  Lymph Nodes: Cervical, supraclavicular and axillary nodes normal  Neurological: Motor and sensory examination equal and normal. A and O x 3  Psychiatry: Appropriate mood and affect.        LABS:                        11.3   26.8  )-----------( 447      ( 29 Aug 2017 02:25 )             34.5                         9.2    21.2  )-----------( 369      ( 28 Aug 2017 01:14 )             28.4     08-    142  |  103  |  10  ----------------------------<  146<H>  3.8   |  25  |  0.43<L>        140  |  104  |  9   ----------------------------<  133<H>  4.0   |  27  |  0.35<L>    Ca      9.1          Ca      8.2<L>          Phos    1.9           TPro  5.1<L>  /  Alb  2.5<L>  /  TBili  0.4  /  DBili  x   /  AST  22  /  ALT  18  /  AlkPhos  62      TPro  4.9<L>  /  Alb  2.2<L>  /  TBili  1.3<H>  /  DBili  x   /  AST  24  /  ALT  22  /  AlkPhos  69      PT/INR - ( 29 Aug 2017 02:26 )   PT: 14.3 sec;   INR: 1.30 ratio         PTT - ( 29 Aug 2017 02:26 )  PTT:34.0 sec  Venous Blood Gas:   @ 21:53  7.37/48/38//  VBG Lactate: 1.1      MICROBIOLOGY:     Culture - Blood (17 @ 05:49)    Specimen Source: .Blood Blood-Peripheral    Culture Results:   No growth to date.    Culture - Blood (17 @ 02:31)    Specimen Source: .Blood Blood-Peripheral    Culture Results:   No growth to date.    Culture - Body Fluid with Gram Stain (17 @ 00:50)    Gram Stain:   polymorphonuclear leukocytes seen  Gram Negative Rods seen    Specimen Source: .Body Fluid Pleural Fluid    Culture Results:   Numerous Fusobacterium nucleatum    Culture - Body Fluid with Gram Stain (17 @ 13:05)    Gram Stain:   Rare polymorphonuclear leukocytes per low power field  No organisms seen    Specimen Source: .Body Fluid Pleural Fluid    Culture Results:   Moderate Fusobacterium nucleatum    RADIOLOGY:  [x ] Chest radiographs reviewed and interpreted by me    < from: Xray Chest 1 View AP- PORTABLE-Urgent (17 @ 14:35) >    EXAM:  CHEST-PORTABLE URGENT                            PROCEDURE DATE:  2017            INTERPRETATION:    CLINICAL INDICATION: Evaluate pneumothorax    TECHNIQUE: Portable frontal view of the chest.    COMPARISON: Frontal chest radiograph from 2017 at 10:17 PM.    FINDINGS:   There is a right-sided IJ central venous catheter with distal tip   terminating in the region of the SVC. There are 2 right apical chest   tubes   Cardiac size is inaccurately assessed on this projection. Aortic knob   calcifications.  There is an unchanged right basilar opacity representing right lower lobe   pneumonia and/or atelectasis.  There is unchanged small right apical pneumothorax. Small bilateral   pleural effusions.  No acute osseous abnormality.      IMPRESSION:     1. Unchanged small right pneumothorax  2. Unchanged right lower lobe pneumonia and/or atelectasis  3. Small bilateral pleural effusions.    JOSHUA RICHTER M.D., RADIOLOGY RESIDENT  This document has been electronically signed.  LUCA FITZGERALD M.D.; ATTENDING RADIOLOGIST  This document has been electronically signed. Aug 29 2017  6:58AM      < end of copied text >  ---------------------------------------------------------------------------------------------------------  < from: Xray Chest 1 View AP -PORTABLE-Routine (17 @ 22:15) >    EXAM:  CHEST PORTABLE ROUTINE                            PROCEDURE DATE:  2017            INTERPRETATION:    CLINICAL INDICATION: Status post surgery    TECHNIQUE: Portable frontal view of the chest.    COMPARISON: Frontal chest radiograph from 2017 at 6:27 AM.    FINDINGS:   There is a right-sided IJ central venous catheter with distal tip   terminating in the region of the SVC. There are 2 right apical chest   tubes and a right basilar chest tube in place.  Cardiac size is within normal limits.  There is worsening right basilar opacity likely representing right lower   lobe pneumonia and/or atelectasis.  Small bilateral pleural effusions. There is redemonstration of a small   right-sided pneumothorax.  There is redemonstration of a displaced, angulated midshaft humeral   fracture      IMPRESSION:   1. Worsening right lower lobe pneumonia and/or atelectasis.   2. Small bilateral pleural effusions   3. Unchanged small right apical pneumothorax.    JOSHUA RICHTER M.D., RADIOLOGY RESIDENT  This document has been electronically signed.  LUCA FITZGERALD M.D.; ATTENDING RADIOLOGIST  This document has been electronically signed. Aug 28 2017 12:03PM      < end of copied text >  ---------------------------------------------------------------------------------------------------------  < from: CT Angio Chest w/ IV Cont (17 @ 15:24) >    EXAM:  CT ANGIO CHEST (W)AW IC                            PROCEDURE DATE:  2017        INTERPRETATION:  CLINICAL INFORMATION: Hypoxia and rapid atrial   fibrillation. Pneumonia on x-ray dated 2017.    PROCEDURE:   CT Pulmonary Angiogram was performed with intravenous contrast.     Intravenous contrast: 90 ml Omnipaque 350. 10 ml discarded.    Reconstructions were performed in axial thin, axial maximum intensity   projection, sagittal and coronal planes.    COMPARISON: Chest x-raydated 2017 and 2017. MRI abdomen   2009.    FINDINGS:    LUNGS AND LARGE AIRWAYS: Patent central airways. Compressive atelectasis   of the bilateral lower lobes, greater on the right, and the right middle   lobe. Foci of peripherally oriented groundglass opacities in the left   upper lobe. Small amount of paraseptal emphysema is seen in the left   upper lobe.  PLEURA: Large multiloculated right pleural effusion with thickened and   mildly hyperdense peripheral wall, indicative of pleural thickening.   Small layering left pleural effusion.  VESSELS: No evidence of pulmonary embolism. Atherosclerotic   calcifications of the thoracic aorta and coronary arteries.  HEART: There is mild dilatation of the left atrium. No pericardial   effusion.  MEDIASTINUM AND JEAN PIERRE: No lymphadenopathy.  CHEST WALL AND LOWER NECK: Within normal limits.  UPPER ABDOMEN: Partially visualized peripherally calcified right renal   cyst, as seen on prior MRI abdomen dated 2009, with increased   calcifications from prior study. Unchanged hepatic calcification. Status   post cholecystectomy.  BONES: Degenerative spondylosis of the spine. Partially visualized   comminuted spiral fracture of the left humeral mid to distal shaft.    IMPRESSION:    No evidence of pulmonary embolism.    Large multiloculated right pleural effusion with pleural thickening,   concerning for possible infectious or neoplastic etiology. Compressive   partial atelectasis of the right lower and middle lobes.    Small left pleural effusion with associated compressive atelectasis of   the left lower lobe.    Patchy groundglass opacity in the left upper lobes which may be   infectious or related to pulmonary edema given the presence of pleural   effusion.    Partially visualized acute comminuted spiral fracture of the left humeral   mid to distal shaft.     SAVAGE KOTLYAR M.D., RADIOLOGY RESIDENT  This document has been electronically signed.  JEANETH ROBERTS M.D. ATTENDING RADIOLOGIST  This document has beenelectronically signed. Aug 23 2017  4:29PM          < end of copied text >  --------------------------------------------------------------------------------------------------------- [facility]     PROGRESS NOTE    CHIEF COMPLAINT: empyema    INTERVAL HISTORY: out of bed to chair; quite confused and diaphoretic; abdominal discomfort with constipation; AF with RVR overnight restarted on cardizem gtt;     REVIEW OF SYSTEMS:  Constitutional: As per interval history  HEENT: Within normal limits  CV: As per interval history  Resp: As per interval history  GI: As per interval history  : Within normal limits  Musculoskeletal: Within normal limits  Skin: Within normal limits  Neurological: As per interval history  Psychiatric: Within normal limits  Endocrine: Within normal limits  Hematologic/Lymphatic: Within normal limits  Allergic/Immunologic: Within normal limits    MEDICATIONS:     Pulmonary "      Anti-microbials:  meropenem IVPB   IV Intermittent   meropenem IVPB 1000 milliGRAM(s) IV Intermittent every 8 hours      Cardiovascular:  metoprolol 25 milliGRAM(s) Oral every 12 hours  diltiazem Infusion 10 mG/Hr IV Continuous <Continuous>      Other:  atorvastatin 40 milliGRAM(s) Oral at bedtime  aspirin  chewable 81 milliGRAM(s) Oral daily  oxyCODONE    5 mG/acetaminophen 325 mG 2 Tablet(s) Oral every 4 hours PRN  oxyCODONE    5 mG/acetaminophen 325 mG 1 Tablet(s) Oral every 4 hours PRN  famotidine    Tablet 20 milliGRAM(s) Oral daily  heparin  Infusion 1100 Unit(s)/Hr IV Continuous <Continuous>        OBJECTIVE:        I&O's Detail    28 Aug 2017 07:01  -  29 Aug 2017 07:00  --------------------------------------------------------  IN:    diltiazem Infusion: 5 mL    diltiazem Infusion: 30 mL    heparin Infusion: 44 mL    heparin Infusion: 85 mL    IV PiggyBack: 200 mL    Oral Fluid: 320 mL  Total IN: 684 mL    OUT:    Voided: 700 mL  Total OUT: 700 mL    Total NET: -16 mL      Daily Weight in k (28 Aug 2017 14:18)    CAPILLARY BLOOD GLUCOSE      PHYSICAL EXAM:       ICU Vital Signs Last 24 Hrs  T(C): 36.6 (29 Aug 2017 05:00), Max: 36.7 (28 Aug 2017 12:00)  T(F): 97.9 (29 Aug 2017 05:00), Max: 98 (28 Aug 2017 12:00)  HR: 140 (29 Aug 2017 05:00) (95 - 162)  BP: 145/56 (29 Aug 2017 05:00) (132/83 - 182/71)  BP(mean): 107 (28 Aug 2017 15:00) (87 - 117)  ABP: --  ABP(mean): --  RR: 20 (29 Aug 2017 05:00) (14 - 22)  SpO2: 94% (29 Aug 2017 05:00) (94% - 100%) on 2lpm     General: Confused. Diaphoretic. No distress. Appears stated age 	  HEENT:   AT/NC/Anicteric. PERRL/EOMI. Normal oral mucosa,  Neck: Supple. Trachea midline. Thyroid without enlargement/tenderness/nodules. No carotid bruit. No JVD	  Cardiovascular: Irregularly irregular rate and rhythm. Tachycardic S1 S2 normal. II/VI systolic murmur  Respiratory: Respirations unlabored. Diffuse right sided rales ~ 2/3 up - 3 chest tubes in place; anterior tube with air leak  Abdomen: Soft. Non-tender. Non-distended. No organomegaly. No masses. Normal BS  Extremities: Warm to touch. Mild lower extremity and LUE edema; bilateral SCDs. LUE in hard brace  Pulses: 2+ peripheral pulses all extremities	  Skin: Thoracotomy incision bandaged - C/D/I  Lymph Nodes: Cervical, supraclavicular and axillary nodes normal  Neurological: Motor and sensory examination equal and normal. A and O x 3  Psychiatry: Appropriate mood and affect.        LABS:                        11.3   26.8  )-----------( 447      ( 29 Aug 2017 02:25 )             34.5                         9.2    21.2  )-----------( 369      ( 28 Aug 2017 01:14 )             28.4     08-    142  |  103  |  10  ----------------------------<  146<H>  3.8   |  25  |  0.43<L>        140  |  104  |  9   ----------------------------<  133<H>  4.0   |  27  |  0.35<L>    Ca      9.1          Ca      8.2<L>          Phos    1.9           TPro  5.1<L>  /  Alb  2.5<L>  /  TBili  0.4  /  DBili  x   /  AST  22  /  ALT  18  /  AlkPhos  62      TPro  4.9<L>  /  Alb  2.2<L>  /  TBili  1.3<H>  /  DBili  x   /  AST  24  /  ALT  22  /  AlkPhos  69      PT/INR - ( 29 Aug 2017 02:26 )   PT: 14.3 sec;   INR: 1.30 ratio         PTT - ( 29 Aug 2017 02:26 )  PTT:34.0 sec  Venous Blood Gas:   @ 21:53  7.37/48/38//67  VBG Lactate: 1.1      MICROBIOLOGY:     Culture - Blood (17 @ 05:49)    Specimen Source: .Blood Blood-Peripheral    Culture Results:   No growth to date.    Culture - Blood (17 @ 02:31)    Specimen Source: .Blood Blood-Peripheral    Culture Results:   No growth to date.    Culture - Body Fluid with Gram Stain (17 @ 00:50)    Gram Stain:   polymorphonuclear leukocytes seen  Gram Negative Rods seen    Specimen Source: .Body Fluid Pleural Fluid    Culture Results:   Numerous Fusobacterium nucleatum    Culture - Body Fluid with Gram Stain (17 @ 13:05)    Gram Stain:   Rare polymorphonuclear leukocytes per low power field  No organisms seen    Specimen Source: .Body Fluid Pleural Fluid    Culture Results:   Moderate Fusobacterium nucleatum    RADIOLOGY:  [x ] Chest radiographs reviewed and interpreted by me    < from: Xray Chest 1 View AP -PORTABLE-Routine (17 @ 06:28) >    EXAM:  CHEST PORTABLE ROUTINE                          PROCEDURE DATE:  2017      INTERPRETATION:  CLINICAL INFORMATION: Status post decortication.    EXAM: Single frontal view chest radiograph.    COMPARISON:  Chest radiograph 2017    FINDINGS:   There is right mid to lower lung opacification likely pulmonary   parenchymal disease. It is unchanged from the comparison exam. There are   2 right-sided drainage catheters.    There is tortuosity and calcification of the aorta.    There are multilevel degenerative changes of the spine.    IMPRESSION:   Right-sided opacity likely pneumonia versus asymmetric pulmonary edema    SERGIO HOUGH M.D., RADIOLOGY RESIDENT  This document has been electronically signed.  HARIS VELÁSQUEZ M.D., ATTENDING RADIOLOGIST  This document has been electronically signed. Aug 29 2017  2:39PM    ---------------------------------------------------------------------------------------------------------      < end of copied text >      < from: Xray Chest 1 View AP- PORTABLE-Urgent (17 @ 14:35) >    EXAM:  CHEST-PORTABLE URGENT                            PROCEDURE DATE:  2017            INTERPRETATION:    CLINICAL INDICATION: Evaluate pneumothorax    TECHNIQUE: Portable frontal view of the chest.    COMPARISON: Frontal chest radiograph from 2017 at 10:17 PM.    FINDINGS:   There is a right-sided IJ central venous catheter with distal tip   terminating in the region of the SVC. There are 2 right apical chest   tubes   Cardiac size is inaccurately assessed on this projection. Aortic knob   calcifications.  There is an unchanged right basilar opacity representing right lower lobe   pneumonia and/or atelectasis.  There is unchanged small right apical pneumothorax. Small bilateral   pleural effusions.  No acute osseous abnormality.      IMPRESSION:     1. Unchanged small right pneumothorax  2. Unchanged right lower lobe pneumonia and/or atelectasis  3. Small bilateral pleural effusions.    JOSHUA RICHTER M.D., RADIOLOGY RESIDENT  This document has been electronically signed.  LUCA FITZGERALD M.D.; ATTENDING RADIOLOGIST  This document has been electronically signed. Aug 29 2017  6:58AM      < end of copied text >  ---------------------------------------------------------------------------------------------------------  < from: Xray Chest 1 View AP -PORTABLE-Routine (17 @ 22:15) >    EXAM:  CHEST PORTABLE ROUTINE                            PROCEDURE DATE:  2017            INTERPRETATION:    CLINICAL INDICATION: Status post surgery    TECHNIQUE: Portable frontal view of the chest.    COMPARISON: Frontal chest radiograph from 2017 at 6:27 AM.    FINDINGS:   There is a right-sided IJ central venous catheter with distal tip   terminating in the region of the SVC. There are 2 right apical chest   tubes and a right basilar chest tube in place.  Cardiac size is within normal limits.  There is worsening right basilar opacity likely representing right lower   lobe pneumonia and/or atelectasis.  Small bilateral pleural effusions. There is redemonstration of a small   right-sided pneumothorax.  There is redemonstration of a displaced, angulated midshaft humeral   fracture      IMPRESSION:   1. Worsening right lower lobe pneumonia and/or atelectasis.   2. Small bilateral pleural effusions   3. Unchanged small right apical pneumothorax.    JOSHUA RICHTER M.D., RADIOLOGY RESIDENT  This document has been electronically signed.  LUCA FITZGERALD M.D.; ATTENDING RADIOLOGIST  This document has been electronically signed. Aug 28 2017 12:03PM      < end of copied text >  ---------------------------------------------------------------------------------------------------------  < from: CT Angio Chest w/ IV Cont (17 @ 15:24) >    EXAM:  CT ANGIO CHEST (W)AW IC                            PROCEDURE DATE:  2017        INTERPRETATION:  CLINICAL INFORMATION: Hypoxia and rapid atrial   fibrillation. Pneumonia on x-ray dated 2017.    PROCEDURE:   CT Pulmonary Angiogram was performed with intravenous contrast.     Intravenous contrast: 90 ml Omnipaque 350. 10 ml discarded.    Reconstructions were performed in axial thin, axial maximum intensity   projection, sagittal and coronal planes.    COMPARISON: Chest x-raydated 2017 and 2017. MRI abdomen   2009.    FINDINGS:    LUNGS AND LARGE AIRWAYS: Patent central airways. Compressive atelectasis   of the bilateral lower lobes, greater on the right, and the right middle   lobe. Foci of peripherally oriented groundglass opacities in the left   upper lobe. Small amount of paraseptal emphysema is seen in the left   upper lobe.  PLEURA: Large multiloculated right pleural effusion with thickened and   mildly hyperdense peripheral wall, indicative of pleural thickening.   Small layering left pleural effusion.  VESSELS: No evidence of pulmonary embolism. Atherosclerotic   calcifications of the thoracic aorta and coronary arteries.  HEART: There is mild dilatation of the left atrium. No pericardial   effusion.  MEDIASTINUM AND JEAN PIERRE: No lymphadenopathy.  CHEST WALL AND LOWER NECK: Within normal limits.  UPPER ABDOMEN: Partially visualized peripherally calcified right renal   cyst, as seen on prior MRI abdomen dated 2009, with increased   calcifications from prior study. Unchanged hepatic calcification. Status   post cholecystectomy.  BONES: Degenerative spondylosis of the spine. Partially visualized   comminuted spiral fracture of the left humeral mid to distal shaft.    IMPRESSION:    No evidence of pulmonary embolism.    Large multiloculated right pleural effusion with pleural thickening,   concerning for possible infectious or neoplastic etiology. Compressive   partial atelectasis of the right lower and middle lobes.    Small left pleural effusion with associated compressive atelectasis of   the left lower lobe.    Patchy groundglass opacity in the left upper lobes which may be   infectious or related to pulmonary edema given the presence of pleural   effusion.    Partially visualized acute comminuted spiral fracture of the left humeral   mid to distal shaft.     SAVAGE CID M.D., RADIOLOGY RESIDENT  This document has been electronically signed.  JEANETH ROBERTS M.D. ATTENDING RADIOLOGIST  This document has beenelectronically signed. Aug 23 2017  4:29PM          < end of copied text >  --------------------------------------------------------------------------------------------------------- [facility]     PROGRESS NOTE    CHIEF COMPLAINT: empyema    INTERVAL HISTORY: out of bed to chair; quite confused and diaphoretic; abdominal discomfort with constipation; AF with RVR overnight restarted on cardizem gtt;     REVIEW OF SYSTEMS:  Constitutional: As per interval history  HEENT: Within normal limits  CV: As per interval history  Resp: As per interval history  GI: As per interval history  : Within normal limits  Musculoskeletal: Within normal limits  Skin: Within normal limits  Neurological: As per interval history  Psychiatric: Within normal limits  Endocrine: Within normal limits  Hematologic/Lymphatic: Within normal limits  Allergic/Immunologic: Within normal limits    MEDICATIONS:     Pulmonary "      Anti-microbials:  meropenem IVPB   IV Intermittent   meropenem IVPB 1000 milliGRAM(s) IV Intermittent every 8 hours      Cardiovascular:  metoprolol 25 milliGRAM(s) Oral every 12 hours  diltiazem Infusion 10 mG/Hr IV Continuous <Continuous>      Other:  atorvastatin 40 milliGRAM(s) Oral at bedtime  aspirin  chewable 81 milliGRAM(s) Oral daily  oxyCODONE    5 mG/acetaminophen 325 mG 2 Tablet(s) Oral every 4 hours PRN  oxyCODONE    5 mG/acetaminophen 325 mG 1 Tablet(s) Oral every 4 hours PRN  famotidine    Tablet 20 milliGRAM(s) Oral daily  heparin  Infusion 1100 Unit(s)/Hr IV Continuous <Continuous>        OBJECTIVE:        I&O's Detail    28 Aug 2017 07:01  -  29 Aug 2017 07:00  --------------------------------------------------------  IN:    diltiazem Infusion: 5 mL    diltiazem Infusion: 30 mL    heparin Infusion: 44 mL    heparin Infusion: 85 mL    IV PiggyBack: 200 mL    Oral Fluid: 320 mL  Total IN: 684 mL    OUT:    Voided: 700 mL  Total OUT: 700 mL    Total NET: -16 mL      Daily Weight in k (28 Aug 2017 14:18)    CAPILLARY BLOOD GLUCOSE      PHYSICAL EXAM:       ICU Vital Signs Last 24 Hrs  T(C): 36.6 (29 Aug 2017 05:00), Max: 36.7 (28 Aug 2017 12:00)  T(F): 97.9 (29 Aug 2017 05:00), Max: 98 (28 Aug 2017 12:00)  HR: 140 (29 Aug 2017 05:00) (95 - 162)  BP: 145/56 (29 Aug 2017 05:00) (132/83 - 182/71)  BP(mean): 107 (28 Aug 2017 15:00) (87 - 117)  ABP: --  ABP(mean): --  RR: 20 (29 Aug 2017 05:00) (14 - 22)  SpO2: 94% (29 Aug 2017 05:00) (94% - 100%) on 2lpm     General: Confused. Diaphoretic. No distress. Appears stated age 	  HEENT:   AT/NC/Anicteric. PERRL/EOMI. Normal oral mucosa,  Neck: Supple. Trachea midline. Thyroid without enlargement/tenderness/nodules. No carotid bruit. No JVD	  Cardiovascular: Irregularly irregular rate and rhythm. Tachycardic S1 S2 normal. II/VI systolic murmur  Respiratory: Respirations unlabored. Diffuse right sided rales ~ 2/3 up - 3 chest tubes in place; anterior tube with air leak  Abdomen: Soft. Non-tender. Non-distended. No organomegaly. No masses. Normal BS  Extremities: Warm to touch. Mild lower extremity and LUE edema; bilateral SCDs. LUE in hard brace  Pulses: 2+ peripheral pulses all extremities	  Skin: Thoracotomy incision bandaged - C/D/I  Lymph Nodes: Cervical, supraclavicular and axillary nodes normal  Neurological: Motor and sensory examination equal and normal. A and O x 3  Psychiatry: Appropriate mood and affect.        LABS:                        11.3   26.8  )-----------( 447      ( 29 Aug 2017 02:25 )             34.5                         9.2    21.2  )-----------( 369      ( 28 Aug 2017 01:14 )             28.4     08-    142  |  103  |  10  ----------------------------<  146<H>  3.8   |  25  |  0.43<L>        140  |  104  |  9   ----------------------------<  133<H>  4.0   |  27  |  0.35<L>    Ca      9.1          Ca      8.2<L>          Phos    1.9           TPro  5.1<L>  /  Alb  2.5<L>  /  TBili  0.4  /  DBili  x   /  AST  22  /  ALT  18  /  AlkPhos  62      TPro  4.9<L>  /  Alb  2.2<L>  /  TBili  1.3<H>  /  DBili  x   /  AST  24  /  ALT  22  /  AlkPhos  69      PT/INR - ( 29 Aug 2017 02:26 )   PT: 14.3 sec;   INR: 1.30 ratio         PTT - ( 29 Aug 2017 02:26 )  PTT:34.0 sec  Venous Blood Gas:   @ 21:53  7.37/48/38//67  VBG Lactate: 1.1      MICROBIOLOGY:     Culture - Blood (17 @ 05:49)    Specimen Source: .Blood Blood-Peripheral    Culture Results:   No growth to date.    Culture - Blood (17 @ 02:31)    Specimen Source: .Blood Blood-Peripheral    Culture Results:   No growth to date.    Culture - Body Fluid with Gram Stain (17 @ 00:50)    Gram Stain:   polymorphonuclear leukocytes seen  Gram Negative Rods seen    Specimen Source: .Body Fluid Pleural Fluid    Culture Results:   Numerous Fusobacterium nucleatum    Culture - Body Fluid with Gram Stain (17 @ 13:05)    Gram Stain:   Rare polymorphonuclear leukocytes per low power field  No organisms seen    Specimen Source: .Body Fluid Pleural Fluid    Culture Results:   Moderate Fusobacterium nucleatum    RADIOLOGY:  [x ] Chest radiographs reviewed and interpreted by me    < from: Xray Chest 1 View AP -PORTABLE-Routine (17 @ 06:28) >    EXAM:  CHEST PORTABLE ROUTINE                          PROCEDURE DATE:  2017      INTERPRETATION:  CLINICAL INFORMATION: Status post decortication.    EXAM: Single frontal view chest radiograph.    COMPARISON:  Chest radiograph 2017    FINDINGS:   There is right mid to lower lung opacification likely pulmonary   parenchymal disease. It is unchanged from the comparison exam. There are   2 right-sided drainage catheters.    There is tortuosity and calcification of the aorta.    There are multilevel degenerative changes of the spine.    IMPRESSION:   Right-sided opacity likely pneumonia versus asymmetric pulmonary edema    SERGIO HOUGH M.D., RADIOLOGY RESIDENT  This document has been electronically signed.  HARIS VELÁSQUEZ M.D., ATTENDING RADIOLOGIST  This document has been electronically signed. Aug 29 2017  2:39PM    ---------------------------------------------------------------------------------------------------------      < end of copied text >      < from: Xray Chest 1 View AP- PORTABLE-Urgent (17 @ 14:35) >    EXAM:  CHEST-PORTABLE URGENT                            PROCEDURE DATE:  2017            INTERPRETATION:    CLINICAL INDICATION: Evaluate pneumothorax    TECHNIQUE: Portable frontal view of the chest.    COMPARISON: Frontal chest radiograph from 2017 at 10:17 PM.    FINDINGS:   There is a right-sided IJ central venous catheter with distal tip   terminating in the region of the SVC. There are 2 right apical chest   tubes   Cardiac size is inaccurately assessed on this projection. Aortic knob   calcifications.  There is an unchanged right basilar opacity representing right lower lobe   pneumonia and/or atelectasis.  There is unchanged small right apical pneumothorax. Small bilateral   pleural effusions.  No acute osseous abnormality.      IMPRESSION:     1. Unchanged small right pneumothorax  2. Unchanged right lower lobe pneumonia and/or atelectasis  3. Small bilateral pleural effusions.    JOSHUA RICHTER M.D., RADIOLOGY RESIDENT  This document has been electronically signed.  LUCA FITZGERALD M.D.; ATTENDING RADIOLOGIST  This document has been electronically signed. Aug 29 2017  6:58AM      < end of copied text >  ---------------------------------------------------------------------------------------------------------  < from: Xray Chest 1 View AP -PORTABLE-Routine (17 @ 22:15) >    EXAM:  CHEST PORTABLE ROUTINE                            PROCEDURE DATE:  2017            INTERPRETATION:    CLINICAL INDICATION: Status post surgery    TECHNIQUE: Portable frontal view of the chest.    COMPARISON: Frontal chest radiograph from 2017 at 6:27 AM.    FINDINGS:   There is a right-sided IJ central venous catheter with distal tip   terminating in the region of the SVC. There are 2 right apical chest   tubes and a right basilar chest tube in place.  Cardiac size is within normal limits.  There is worsening right basilar opacity likely representing right lower   lobe pneumonia and/or atelectasis.  Small bilateral pleural effusions. There is redemonstration of a small   right-sided pneumothorax.  There is redemonstration of a displaced, angulated midshaft humeral   fracture      IMPRESSION:   1. Worsening right lower lobe pneumonia and/or atelectasis.   2. Small bilateral pleural effusions   3. Unchanged small right apical pneumothorax.    JOSHUA RICHTER M.D., RADIOLOGY RESIDENT  This document has been electronically signed.  LUCA FITZGERALD M.D.; ATTENDING RADIOLOGIST  This document has been electronically signed. Aug 28 2017 12:03PM      < end of copied text >  ---------------------------------------------------------------------------------------------------------  < from: CT Angio Chest w/ IV Cont (17 @ 15:24) >    EXAM:  CT ANGIO CHEST (W)AW IC                            PROCEDURE DATE:  2017        INTERPRETATION:  CLINICAL INFORMATION: Hypoxia and rapid atrial   fibrillation. Pneumonia on x-ray dated 2017.    PROCEDURE:   CT Pulmonary Angiogram was performed with intravenous contrast.     Intravenous contrast: 90 ml Omnipaque 350. 10 ml discarded.    Reconstructions were performed in axial thin, axial maximum intensity   projection, sagittal and coronal planes.    COMPARISON: Chest x-raydated 2017 and 2017. MRI abdomen   2009.    FINDINGS:    LUNGS AND LARGE AIRWAYS: Patent central airways. Compressive atelectasis   of the bilateral lower lobes, greater on the right, and the right middle   lobe. Foci of peripherally oriented groundglass opacities in the left   upper lobe. Small amount of paraseptal emphysema is seen in the left   upper lobe.  PLEURA: Large multiloculated right pleural effusion with thickened and   mildly hyperdense peripheral wall, indicative of pleural thickening.   Small layering left pleural effusion.  VESSELS: No evidence of pulmonary embolism. Atherosclerotic   calcifications of the thoracic aorta and coronary arteries.  HEART: There is mild dilatation of the left atrium. No pericardial   effusion.  MEDIASTINUM AND JEAN PIERRE: No lymphadenopathy.  CHEST WALL AND LOWER NECK: Within normal limits.  UPPER ABDOMEN: Partially visualized peripherally calcified right renal   cyst, as seen on prior MRI abdomen dated 2009, with increased   calcifications from prior study. Unchanged hepatic calcification. Status   post cholecystectomy.  BONES: Degenerative spondylosis of the spine. Partially visualized   comminuted spiral fracture of the left humeral mid to distal shaft.    IMPRESSION:    No evidence of pulmonary embolism.    Large multiloculated right pleural effusion with pleural thickening,   concerning for possible infectious or neoplastic etiology. Compressive   partial atelectasis of the right lower and middle lobes.    Small left pleural effusion with associated compressive atelectasis of   the left lower lobe.    Patchy groundglass opacity in the left upper lobes which may be   infectious or related to pulmonary edema given the presence of pleural   effusion.    Partially visualized acute comminuted spiral fracture of the left humeral   mid to distal shaft.     SAVAGE CID M.D., RADIOLOGY RESIDENT  This document has been electronically signed.  JEANETH ROBERTS M.D. ATTENDING RADIOLOGIST  This document has beenelectronically signed. Aug 23 2017  4:29PM          < end of copied text >  ---------------------------------------------------------------------------------------------------------

## 2017-08-29 NOTE — PROGRESS NOTE ADULT - SUBJECTIVE AND OBJECTIVE BOX
Subjective: Pt states "I'm doing ok" denies any CP, SOB, N/V and palpitations. No acute events overnight.     Vital Signs Last 24 Hrs  T(F): 97.9, Max: 97.9   HR: 140  BP: 145/56  RR: 20  SpO2: 94% 2L NC    17 @ 07:01  -  17 @ 07:00  --------------------------------------------------------  IN: 684 mL / OUT: 700 mL / NET: -16 mL    Daily Weight in k (28 Aug 2017 14:18)    Relevant labs, radiology and Medications reviewed                        11.3   26.8  )-----------( 447      ( 29 Aug 2017 02:25 )             34.5     142  |  103  |  10  ----------------------------<  146<H>  3.8   |  25  |  0.43<L>      PHYSICAL EXAM  Neurology: A&Ox3, NAD, no gross deficits  CV : Irregular +S1S2  Lungs: Respirations non-labored, B/L BS clear with crackles on right. R thoracotomy incision DSD CDI.             Right CT #1 (posterior) serosanguinous +air leak             Right CT #2 (diaphragm) serousanguinous drainage.             Right CT #3 (anterior)  serosanguinous +air leak  Abdomen: Soft, NT/ND, +BSx4Q  Extremities: B/L LE no edema, negative calf tenderness, +PP          MEDICATIONS  atorvastatin 40 milliGRAM(s) Oral at bedtime  aspirin  chewable 81 milliGRAM(s) Oral daily  meropenem IVPB   IV Intermittent   meropenem IVPB 1000 milliGRAM(s) IV Intermittent every 8 hours  oxyCODONE    5 mG/acetaminophen 325 mG 2 Tablet(s) Oral every 4 hours PRN  oxyCODONE    5 mG/acetaminophen 325 mG 1 Tablet(s) Oral every 4 hours PRN  metoprolol 25 milliGRAM(s) Oral every 12 hours  famotidine    Tablet 20 milliGRAM(s) Oral daily  heparin  Infusion 1100 Unit(s)/Hr IV Continuous <Continuous>  diltiazem Infusion 10 mG/Hr IV Continuous <Continuous>  polyethylene glycol 3350 17 Gram(s) Oral every 1 hour      Pt seen and examined with thoracic surgery attending, Dr. Cummins.

## 2017-08-29 NOTE — PROGRESS NOTE ADULT - ASSESSMENT
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

## 2017-08-29 NOTE — PROGRESS NOTE ADULT - ASSESSMENT
73 y/o female with PMhx of HTN, HLD, gerd, insomnia s/p mechanical fall in 5/2017 with left humeral fracture sent from preop testing for Afib with RVR on 8/22. Pt scheduled to undergo ORIF of left upper extremity tomorrow, now admitted for workup of Afib/rvr/sepsis. 8/23 CTA Chest w/IV cont-Large multiloculated right pleural effusion with pleural thickening, concerning for possible infectious or neoplastic etiology. Compressive partial atelectasis of the right lower and middle lobes. Pt s/p 8/24/17 right VATS to open thoracotomy for evacuation or R chest empyema and decortication.

## 2017-08-29 NOTE — PROGRESS NOTE ADULT - ASSESSMENT
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 blood cx were sent for increased WBC (and some ?AMS this morning), would hold off PICC until blood cx negative x 24  2. continue meropenem for now d/t leukocytosis (hope to change to unasyn soon)  3. f/u blood cx & trend WBC    Pager [phone].  After 5pm or weekends call ID office at [phone].

## 2017-08-29 NOTE — PHYSICAL THERAPY INITIAL EVALUATION ADULT - PERTINENT HX OF CURRENT PROBLEM, REHAB EVAL
73y/o F s/p mechanical fall 5/2017 with L humeral fracture sent from preop testing for Afib with RVR. Pt scheduled to undergo ORIF of L upper extremity now admitted for workup of Afib/rvr/sepsis. Denies chest pain, palpitations, SOB abdominal pain, fevers, chills, cough, dysuria, hematuria, melena, hematochezia. No headaches, dizziness, LOC. Chronic smoking history over 50 pack years, quit in May 2017. Reports decreased appetite and weight loss of 5-10lbs in the past month. (cont below)

## 2017-08-30 LAB
ANION GAP SERPL CALC-SCNC: 14 MMOL/L — SIGNIFICANT CHANGE UP (ref 5–17)
APTT BLD: 28.8 SEC — SIGNIFICANT CHANGE UP (ref 27.5–37.4)
APTT BLD: 41.2 SEC — HIGH (ref 27.5–37.4)
APTT BLD: 71.2 SEC — HIGH (ref 27.5–37.4)
BUN SERPL-MCNC: 9 MG/DL — SIGNIFICANT CHANGE UP (ref 7–23)
CALCIUM SERPL-MCNC: 7.9 MG/DL — LOW (ref 8.4–10.5)
CHLORIDE SERPL-SCNC: 101 MMOL/L — SIGNIFICANT CHANGE UP (ref 96–108)
CO2 SERPL-SCNC: 27 MMOL/L — SIGNIFICANT CHANGE UP (ref 22–31)
CREAT SERPL-MCNC: 0.41 MG/DL — LOW (ref 0.5–1.3)
CULTURE RESULTS: SIGNIFICANT CHANGE UP
GLUCOSE SERPL-MCNC: 103 MG/DL — HIGH (ref 70–99)
HCT VFR BLD CALC: 28.9 % — LOW (ref 34.5–45)
HCT VFR BLD CALC: 30.1 % — LOW (ref 34.5–45)
HGB BLD-MCNC: 9.4 G/DL — LOW (ref 11.5–15.5)
HGB BLD-MCNC: 9.7 G/DL — LOW (ref 11.5–15.5)
MAGNESIUM SERPL-MCNC: 1.7 MG/DL — SIGNIFICANT CHANGE UP (ref 1.6–2.6)
MCHC RBC-ENTMCNC: 30.4 PG — SIGNIFICANT CHANGE UP (ref 27–34)
MCHC RBC-ENTMCNC: 30.4 PG — SIGNIFICANT CHANGE UP (ref 27–34)
MCHC RBC-ENTMCNC: 32.2 GM/DL — SIGNIFICANT CHANGE UP (ref 32–36)
MCHC RBC-ENTMCNC: 32.4 GM/DL — SIGNIFICANT CHANGE UP (ref 32–36)
MCV RBC AUTO: 94 FL — SIGNIFICANT CHANGE UP (ref 80–100)
MCV RBC AUTO: 94.4 FL — SIGNIFICANT CHANGE UP (ref 80–100)
PHOSPHATE SERPL-MCNC: 2.3 MG/DL — LOW (ref 2.5–4.5)
PLATELET # BLD AUTO: 440 K/UL — HIGH (ref 150–400)
PLATELET # BLD AUTO: 474 K/UL — HIGH (ref 150–400)
POTASSIUM SERPL-MCNC: 3.7 MMOL/L — SIGNIFICANT CHANGE UP (ref 3.5–5.3)
POTASSIUM SERPL-SCNC: 3.7 MMOL/L — SIGNIFICANT CHANGE UP (ref 3.5–5.3)
RBC # BLD: 3.08 M/UL — LOW (ref 3.8–5.2)
RBC # BLD: 3.19 M/UL — LOW (ref 3.8–5.2)
RBC # FLD: 13.2 % — SIGNIFICANT CHANGE UP (ref 10.3–14.5)
RBC # FLD: 13.4 % — SIGNIFICANT CHANGE UP (ref 10.3–14.5)
SODIUM SERPL-SCNC: 142 MMOL/L — SIGNIFICANT CHANGE UP (ref 135–145)
SPECIMEN SOURCE: SIGNIFICANT CHANGE UP
WBC # BLD: 17.6 K/UL — HIGH (ref 3.8–10.5)
WBC # BLD: 18.7 K/UL — HIGH (ref 3.8–10.5)
WBC # FLD AUTO: 17.6 K/UL — HIGH (ref 3.8–10.5)
WBC # FLD AUTO: 18.7 K/UL — HIGH (ref 3.8–10.5)

## 2017-08-30 PROCEDURE — 99232 SBSQ HOSP IP/OBS MODERATE 35: CPT

## 2017-08-30 PROCEDURE — 71010: CPT | Mod: 26,77

## 2017-08-30 PROCEDURE — 71010: CPT | Mod: 26

## 2017-08-30 RX ORDER — HEPARIN SODIUM 5000 [USP'U]/ML
1000 INJECTION INTRAVENOUS; SUBCUTANEOUS
Qty: 25000 | Refills: 0 | Status: DISCONTINUED | OUTPATIENT
Start: 2017-08-30 | End: 2017-08-30

## 2017-08-30 RX ORDER — ACETAMINOPHEN 500 MG
975 TABLET ORAL EVERY 6 HOURS
Qty: 0 | Refills: 0 | Status: DISCONTINUED | OUTPATIENT
Start: 2017-08-30 | End: 2017-09-06

## 2017-08-30 RX ORDER — HEPARIN SODIUM 5000 [USP'U]/ML
1100 INJECTION INTRAVENOUS; SUBCUTANEOUS
Qty: 25000 | Refills: 0 | Status: DISCONTINUED | OUTPATIENT
Start: 2017-08-30 | End: 2017-08-31

## 2017-08-30 RX ORDER — POTASSIUM CHLORIDE 20 MEQ
20 PACKET (EA) ORAL ONCE
Qty: 0 | Refills: 0 | Status: COMPLETED | OUTPATIENT
Start: 2017-08-30 | End: 2017-08-30

## 2017-08-30 RX ORDER — HEPARIN SODIUM 5000 [USP'U]/ML
900 INJECTION INTRAVENOUS; SUBCUTANEOUS
Qty: 25000 | Refills: 0 | Status: DISCONTINUED | OUTPATIENT
Start: 2017-08-30 | End: 2017-08-30

## 2017-08-30 RX ORDER — MAGNESIUM OXIDE 400 MG ORAL TABLET 241.3 MG
400 TABLET ORAL
Qty: 0 | Refills: 0 | Status: COMPLETED | OUTPATIENT
Start: 2017-08-30 | End: 2017-08-31

## 2017-08-30 RX ADMIN — MEROPENEM 200 MILLIGRAM(S): 1 INJECTION INTRAVENOUS at 21:24

## 2017-08-30 RX ADMIN — Medication 50 MILLIGRAM(S): at 05:41

## 2017-08-30 RX ADMIN — ATORVASTATIN CALCIUM 40 MILLIGRAM(S): 80 TABLET, FILM COATED ORAL at 21:24

## 2017-08-30 RX ADMIN — MEROPENEM 200 MILLIGRAM(S): 1 INJECTION INTRAVENOUS at 05:41

## 2017-08-30 RX ADMIN — Medication 20 MILLIEQUIVALENT(S): at 18:51

## 2017-08-30 RX ADMIN — HEPARIN SODIUM 10 UNIT(S)/HR: 5000 INJECTION INTRAVENOUS; SUBCUTANEOUS at 13:12

## 2017-08-30 RX ADMIN — Medication 50 MILLIGRAM(S): at 17:29

## 2017-08-30 RX ADMIN — MAGNESIUM OXIDE 400 MG ORAL TABLET 400 MILLIGRAM(S): 241.3 TABLET ORAL at 20:06

## 2017-08-30 RX ADMIN — Medication 975 MILLIGRAM(S): at 09:56

## 2017-08-30 RX ADMIN — Medication 975 MILLIGRAM(S): at 15:30

## 2017-08-30 RX ADMIN — FAMOTIDINE 20 MILLIGRAM(S): 10 INJECTION INTRAVENOUS at 12:52

## 2017-08-30 RX ADMIN — HEPARIN SODIUM 9 UNIT(S)/HR: 5000 INJECTION INTRAVENOUS; SUBCUTANEOUS at 04:52

## 2017-08-30 RX ADMIN — Medication 81 MILLIGRAM(S): at 12:52

## 2017-08-30 RX ADMIN — HEPARIN SODIUM 11 UNIT(S)/HR: 5000 INJECTION INTRAVENOUS; SUBCUTANEOUS at 21:34

## 2017-08-30 RX ADMIN — Medication 10 MG/HR: at 07:45

## 2017-08-30 RX ADMIN — MEROPENEM 200 MILLIGRAM(S): 1 INJECTION INTRAVENOUS at 13:12

## 2017-08-30 NOTE — PROGRESS NOTE ADULT - SUBJECTIVE AND OBJECTIVE BOX
Vital Signs Last 24 Hrs  T(C): 36.6 (08-30-17 @ 04:00), Max: 36.8 (08-29-17 @ 14:10)  T(F): 97.8 (08-30-17 @ 04:00), Max: 98.3 (08-29-17 @ 14:10)  HR: 80 (08-30-17 @ 04:00) (72 - 89)  BP: 116/54 (08-30-17 @ 04:00) (116/54 - 149/70)  RR: 18 (08-30-17 @ 04:00) (16 - 18)  SpO2: 99% (08-30-17 @ 04:00) (97% - 100%)                               Drains:                                Rt   Pleural  x 2  with a/l                        PHYSICAL EXAM    Neurology: alert and oriented x 3  with no defecits  CV :  RRR    Lungs:   rt side diminshed,   2 rt chest tubes  Abdomen: soft, nontender, nondistended, positive bowel sounds  Extremities:       Lt arm brace    trace b/l le edema

## 2017-08-30 NOTE — PROGRESS NOTE ADULT - PROBLEM SELECTOR PROBLEM 2
Pneumonia
Atrial fibrillation
Atrial fibrillation
Pneumonia
Pneumonia
Atrial fibrillation
Atrial fibrillation

## 2017-08-30 NOTE — PROGRESS NOTE ADULT - ASSESSMENT
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

## 2017-08-30 NOTE — PROGRESS NOTE ADULT - SUBJECTIVE AND OBJECTIVE BOX
Follow Up:      Interval History/ROS:Patient is a 72y old  Female who presents with a chief complaint of Afib (22 Aug 2017 20:06)    feels better today.  still has some R sided chest pain at chest tube site.  had to have maxwell placed due to urinary retention    Allergies    No Known Allergies      ANTIMICROBIALS:  meropenem IVPB 1000 every 8 hours      OTHER MEDS:  atorvastatin 40 milliGRAM(s) Oral at bedtime  aspirin  chewable 81 milliGRAM(s) Oral daily  oxyCODONE    5 mG/acetaminophen 325 mG 2 Tablet(s) Oral every 4 hours PRN  oxyCODONE    5 mG/acetaminophen 325 mG 1 Tablet(s) Oral every 4 hours PRN  famotidine    Tablet 20 milliGRAM(s) Oral daily  diltiazem Infusion 10 mG/Hr IV Continuous <Continuous>  metoprolol 50 milliGRAM(s) Oral two times a day  acetaminophen   Tablet 975 milliGRAM(s) Oral every 6 hours PRN  diltiazem    Tablet 30 milliGRAM(s) Oral every 6 hours  heparin  Infusion 1000 Unit(s)/Hr IV Continuous <Continuous>      Vital Signs Last 24 Hrs  T(C): 36.7 (30 Aug 2017 13:37), Max: 36.7 (29 Aug 2017 20:10)  T(F): 98.1 (30 Aug 2017 13:37), Max: 98.1 (30 Aug 2017 13:37)  HR: 60 (30 Aug 2017 14:29) (60 - 89)  BP: 120/69 (30 Aug 2017 14:29) (114/57 - 149/70)  BP(mean): --  RR: 18 (30 Aug 2017 13:37) (16 - 18)  SpO2: 99% (30 Aug 2017 14:29) (96% - 100%)    PHYSICAL EXAM:    Constitutional: non-toxic    Respiratory: decreased BS on R    Cardiovascular: S1/S2, no murmur, chest tube x 2    Gastrointestinal: soft, NT/ND, normal BS    Genitourinary: new maxwell (placed today)    Neurological: non-focal    Skin: ecchymoses    Psychiatric: alert and oriented, affect appropriate    Lines: PIV                          9.4    17.6  )-----------( 440      ( 30 Aug 2017 04:01 )             28.9       08-30    142  |  101  |  9   ----------------------------<  103<H>  3.7   |  27  |  0.41<L>    Ca    7.9<L>      30 Aug 2017 07:38  Phos  2.3       Mg     1.7         Urinalysis Basic - ( 29 Aug 2017 14:08 )    Color: Yellow / Appearance: Clear / S.009 / pH: x  Gluc: x / Ketone: Negative  / Bili: Negative / Urobili: 4   Blood: x / Protein: 30 mg/dL / Nitrite: Negative   Leuk Esterase: Negative / RBC: >50 /HPF / WBC 25-50 /HPF   Sq Epi: x / Non Sq Epi: x / Bacteria: x      MICROBIOLOGY:  RECENT CULTURES:    Culture - Blood (17 @ 14:37)    Specimen Source: .Blood Blood-Venous    Culture Results:   No growth to date.    Culture - Blood (17 @ 14:37)    Specimen Source: .Blood Blood-Venous    Culture Results:   No growth to date.      RADIOLOGY:    < from: Xray Chest 1 View AP- PORTABLE-Urgent (17 @ 11:14) >  IMPRESSION:   Small right pneumothorax. Right-sided chest tube in place.    Bilateral pleural effusions right larger than left. Marginal interval   improvement in the right-sided pleural effusion.    < end of copied text >

## 2017-08-30 NOTE — PROGRESS NOTE ADULT - SUBJECTIVE AND OBJECTIVE BOX
[facility] - [facility]     PROGRESS NOTE    CHIEF COMPLAINT: empyema    INTERVAL HISTORY: out of bed to chair; back to baseline mental status; abdominal discomfort resolved with placement of maxwell catheter for urinary retention; tele with AF with controlled rate; hemodynamically stable; pain fairly well controlled; first chest tube removed; no fevers, chills or sweats; no dyspnea on nasal canula, chest congestion or wheeze; fair appetite    REVIEW OF SYSTEMS:  Constitutional: As per interval history  HEENT: Within normal limits  CV: As per interval history  Resp: As per interval history  GI: Within normal limits   : As per interval history  Musculoskeletal: Within normal limits  Skin: Within normal limits  Neurological: Within normal limits  Psychiatric: Within normal limits  Endocrine: Within normal limits  Hematologic/Lymphatic: Within normal limits  Allergic/Immunologic: Within normal limits    MEDICATIONS:     Pulmonary "      Anti-microbials:  meropenem IVPB   IV Intermittent   meropenem IVPB 1000 milliGRAM(s) IV Intermittent every 8 hours      Cardiovascular:  diltiazem Infusion 10 mG/Hr IV Continuous <Continuous>  metoprolol 50 milliGRAM(s) Oral two times a day      Other:  atorvastatin 40 milliGRAM(s) Oral at bedtime  aspirin  chewable 81 milliGRAM(s) Oral daily  oxyCODONE    5 mG/acetaminophen 325 mG 2 Tablet(s) Oral every 4 hours PRN  oxyCODONE    5 mG/acetaminophen 325 mG 1 Tablet(s) Oral every 4 hours PRN  famotidine    Tablet 20 milliGRAM(s) Oral daily  heparin  Infusion 900 Unit(s)/Hr IV Continuous <Continuous>        OBJECTIVE:        I&O's Detail    29 Aug 2017 07:01  -  30 Aug 2017 07:00  --------------------------------------------------------  IN:    diltiazem Infusion: 230 mL    heparin Infusion: 96 mL    heparin Infusion: 9 mL    heparin Infusion: 77 mL    heparin Infusion: 46 mL    IV PiggyBack: 300 mL    Oral Fluid: 440 mL  Total IN: 1198 mL    OUT:    Chest Tube: 20 mL    Indwelling Catheter - Urethral: 2050 mL    Voided: 80 mL  Total OUT: 2150 mL    Total NET: -952 mL    PHYSICAL EXAM:       ICU Vital Signs Last 24 Hrs  T(C): 36.6 (30 Aug 2017 04:00), Max: 36.8 (29 Aug 2017 14:10)  T(F): 97.8 (30 Aug 2017 04:00), Max: 98.3 (29 Aug 2017 14:10)  HR: 80 (30 Aug 2017 04:00) (72 - 89)  BP: 116/54 (30 Aug 2017 04:00) (116/54 - 149/70)  BP(mean): --  ABP: --  ABP(mean): --  RR: 18 (30 Aug 2017 04:00) (16 - 18)  SpO2: 99% (30 Aug 2017 04:00) (97% - 100%) on 2lpm     General: Awake and alert. Sitting in chair; No distress. Appears stated age 	  HEENT:   AT/NC/Anicteric. PERRL/EOMI. Normal oral mucosa,  Neck: Supple. Trachea midline. Thyroid without enlargement/tenderness/nodules. No carotid bruit. No JVD	  Cardiovascular: Irregularly irregular rate and rhythm. S1 S2 normal. II/VI systolic murmur  Respiratory: Respirations unlabored. Diffuse right sided rales ~ 2/3 up - 2 chest tubes in place; anterior tube with improved air leak  Abdomen: Soft. Non-tender. Non-distended. No organomegaly. No masses. Normal BS  Extremities: Warm to touch. Mild lower extremity and LUE edema; bilateral SCDs. LUE in hard brace  Pulses: 2+ peripheral pulses all extremities	  Skin: Thoracotomy incision bandaged - C/D/I  Lymph Nodes: Cervical, supraclavicular and axillary nodes normal  Neurological: Motor and sensory examination equal and normal. A and O x 3  Psychiatry: Appropriate mood and affect.      LABS:                        9.4    17.6  )-----------( 440      ( 30 Aug 2017 04:01 )             28.9                         9.6    19.8  )-----------( 473      ( 29 Aug 2017 20:45 )             29.6         142  |  101  |  9   ----------------------------<  103<H>  3.7   |  27  |  0.41<L>        142  |  103  |  10  ----------------------------<  146<H>  3.8   |  25  |  0.43<L>    Ca      7.9<L>          Ca      9.1          Phos    2.3         Phos    1.9           Mg       1.7         TPro  5.1<L>  /  Alb  2.5<L>  /  TBili  0.4  /  DBili  x   /  AST  22  /  ALT  18  /  AlkPhos  62      TPro  4.9<L>  /  Alb  2.2<L>  /  TBili  1.3<H>  /  DBili  x   /  AST  24  /  ALT  22  /  AlkPhos  69      PT/INR - ( 29 Aug 2017 02:26 )   PT: 14.3 sec;   INR: 1.30 ratio         PTT - ( 30 Aug 2017 04:01 )  PTT:71.2 sec    MICROBIOLOGY:   Urinalysis Basic - ( 29 Aug 2017 14:08 )    Color: Yellow / Appearance: Clear / S.009 / pH: x  Gluc: x / Ketone: Negative  / Bili: Negative / Urobili: 4   Blood: x / Protein: 30 mg/dL / Nitrite: Negative   Leuk Esterase: Negative / RBC: >50 /HPF / WBC 25-50 /HPF   Sq Epi: x / Non Sq Epi: x / Bacteria: x    Culture - Blood (17 @ 05:49)    Specimen Source: .Blood Blood-Peripheral    Culture Results:   No growth to date.    Culture - Blood (17 @ 02:31)    Specimen Source: .Blood Blood-Peripheral    Culture Results:   No growth to date.    Culture - Body Fluid with Gram Stain (17 @ 00:50)    Gram Stain:   polymorphonuclear leukocytes seen  Gram Negative Rods seen    Specimen Source: .Body Fluid Pleural Fluid    Culture Results:   Numerous Fusobacterium nucleatum    Culture - Body Fluid with Gram Stain (17 @ 13:05)    Gram Stain:   Rare polymorphonuclear leukocytes per low power field  No organisms seen    Specimen Source: .Body Fluid Pleural Fluid    Culture Results:   Moderate Fusobacterium nucleatum    RADIOLOGY:  [x ] Chest radiographs reviewed and interpreted by me    < from: Xray Chest 1 View AP- PORTABLE-Urgent (17 @ 14:14) >    EXAM:  CHEST-PORTABLE URGENT                            PROCEDURE DATE:  2017      INTERPRETATION:  CLINICAL INFORMATION: Status post chest tube removal.    EXAM: AP portable chest from 2017 at 2:16 PM.    COMPARISON: Chest radiograph from 2017 at 629    FINDINGS:  Paired right-sided chest tubes, unchanged in position.  Small bilateral pleural effusions.  No pneumothorax.  The cardiomediastinal silhouette is unchanged.  Redemonstration of a displaced, age indeterminant left humeral fracture.    IMPRESSION:   Right-sided chest tubes.  Bilateral pleural effusions, larger on right.  No pneumothorax.    ALYSSA SULLIVAN M.D., RADIOLOGY RESIDENT  This document has been electronically signed.  HARIS VELÁSQUEZ M.D., ATTENDING RADIOLOGIST  This document has been electronically signed. Aug 29 2017  3:43PM      < end of copied text >  ---------------------------------------------------------------------------------------------------------    < from: Xray Chest 1 View AP -PORTABLE-Routine (17 @ 06:28) >    EXAM:  CHEST PORTABLE ROUTINE                          PROCEDURE DATE:  2017      INTERPRETATION:  CLINICAL INFORMATION: Status post decortication.    EXAM: Single frontal view chest radiograph.    COMPARISON:  Chest radiograph 2017    FINDINGS:   There is right mid to lower lung opacification likely pulmonary   parenchymal disease. It is unchanged from the comparison exam. There are   2 right-sided drainage catheters.    There is tortuosity and calcification of the aorta.    There are multilevel degenerative changes of the spine.    IMPRESSION:   Right-sided opacity likely pneumonia versus asymmetric pulmonary edema    SERGIO HOUGH M.D., RADIOLOGY RESIDENT  This document has been electronically signed.  HARIS VELÁSQUEZ M.D., ATTENDING RADIOLOGIST  This document has been electronically signed. Aug 29 2017  2:39PM    ---------------------------------------------------------------------------------------------------------      < end of copied text >      < from: Xray Chest 1 View AP- PORTABLE-Urgent (17 @ 14:35) >    EXAM:  CHEST-PORTABLE URGENT                            PROCEDURE DATE:  2017            INTERPRETATION:    CLINICAL INDICATION: Evaluate pneumothorax    TECHNIQUE: Portable frontal view of the chest.    COMPARISON: Frontal chest radiograph from 2017 at 10:17 PM.    FINDINGS:   There is a right-sided IJ central venous catheter with distal tip   terminating in the region of the SVC. There are 2 right apical chest   tubes   Cardiac size is inaccurately assessed on this projection. Aortic knob   calcifications.  There is an unchanged right basilar opacity representing right lower lobe   pneumonia and/or atelectasis.  There is unchanged small right apical pneumothorax. Small bilateral   pleural effusions.  No acute osseous abnormality.      IMPRESSION:     1. Unchanged small right pneumothorax  2. Unchanged right lower lobe pneumonia and/or atelectasis  3. Small bilateral pleural effusions.    JOSHUA RICHTER M.D., RADIOLOGY RESIDENT  This document has been electronically signed.  LUCA FITZGERALD M.D.; ATTENDING RADIOLOGIST  This document has been electronically signed. Aug 29 2017  6:58AM      < end of copied text >  ---------------------------------------------------------------------------------------------------------  < from: Xray Chest 1 View AP -PORTABLE-Routine (17 @ 22:15) >    EXAM:  CHEST PORTABLE ROUTINE                            PROCEDURE DATE:  2017      INTERPRETATION:    CLINICAL INDICATION: Status post surgery    TECHNIQUE: Portable frontal view of the chest.    COMPARISON: Frontal chest radiograph from 2017 at 6:27 AM.    FINDINGS:   There is a right-sided IJ central venous catheter with distal tip   terminating in the region of the SVC. There are 2 right apical chest   tubes and a right basilar chest tube in place.  Cardiac size is within normal limits.  There is worsening right basilar opacity likely representing right lower   lobe pneumonia and/or atelectasis.  Small bilateral pleural effusions. There is redemonstration of a small   right-sided pneumothorax.  There is redemonstration of a displaced, angulated midshaft humeral   fracture      IMPRESSION:   1. Worsening right lower lobe pneumonia and/or atelectasis.   2. Small bilateral pleural effusions   3. Unchanged small right apical pneumothorax.    JOSHUA RICHTER M.D., RADIOLOGY RESIDENT  This document has been electronically signed.  LUCA FITZGERALD M.D.; ATTENDING RADIOLOGIST  This document has been electronically signed. Aug 28 2017 12:03PM      < end of copied text >  ---------------------------------------------------------------------------------------------------------  < from: CT Angio Chest w/ IV Cont (17 @ 15:24) >    EXAM:  CT ANGIO CHEST (W)AW IC                            PROCEDURE DATE:  2017        INTERPRETATION:  CLINICAL INFORMATION: Hypoxia and rapid atrial   fibrillation. Pneumonia on x-ray dated 2017.    PROCEDURE:   CT Pulmonary Angiogram was performed with intravenous contrast.     Intravenous contrast: 90 ml Omnipaque 350. 10 ml discarded.    Reconstructions were performed in axial thin, axial maximum intensity   projection, sagittal and coronal planes.    COMPARISON: Chest x-raydated 2017 and 2017. MRI abdomen   2009.    FINDINGS:    LUNGS AND LARGE AIRWAYS: Patent central airways. Compressive atelectasis   of the bilateral lower lobes, greater on the right, and the right middle   lobe. Foci of peripherally oriented groundglass opacities in the left   upper lobe. Small amount of paraseptal emphysema is seen in the left   upper lobe.  PLEURA: Large multiloculated right pleural effusion with thickened and   mildly hyperdense peripheral wall, indicative of pleural thickening.   Small layering left pleural effusion.  VESSELS: No evidence of pulmonary embolism. Atherosclerotic   calcifications of the thoracic aorta and coronary arteries.  HEART: There is mild dilatation of the left atrium. No pericardial   effusion.  MEDIASTINUM AND JEAN PIERRE: No lymphadenopathy.  CHEST WALL AND LOWER NECK: Within normal limits.  UPPER ABDOMEN: Partially visualized peripherally calcified right renal   cyst, as seen on prior MRI abdomen dated 2009, with increased   calcifications from prior study. Unchanged hepatic calcification. Status   post cholecystectomy.  BONES: Degenerative spondylosis of the spine. Partially visualized   comminuted spiral fracture of the left humeral mid to distal shaft.    IMPRESSION:    No evidence of pulmonary embolism.    Large multiloculated right pleural effusion with pleural thickening,   concerning for possible infectious or neoplastic etiology. Compressive   partial atelectasis of the right lower and middle lobes.    Small left pleural effusion with associated compressive atelectasis of   the left lower lobe.    Patchy groundglass opacity in the left upper lobes which may be   infectious or related to pulmonary edema given the presence of pleural   effusion.    Partially visualized acute comminuted spiral fracture of the left humeral   mid to distal shaft.     SAVAGE CID M.D., RADIOLOGY RESIDENT  This document has been electronically signed.  JEANETH ROBERTS M.D. ATTENDING RADIOLOGIST  This document has beenelectronically signed. Aug 23 2017  4:29PM          < end of copied text >  ---------------------------------------------------------------------------------------------------------

## 2017-08-30 NOTE — PROGRESS NOTE ADULT - PROBLEM SELECTOR PLAN 1
Continue current antibiotics, f/u cx/sensitivity. ID following.  chest tubes to water seal, Daily CXR.  Cough and deep breathe, Incentive Spirometry Q1h, Chest PT.   Ambulate 4x daily as tolerated and with PT.   Continue care per primary team.

## 2017-08-30 NOTE — PROGRESS NOTE ADULT - SUBJECTIVE AND OBJECTIVE BOX
Patient is a 72y old  Female who presents with a chief complaint of Afib (22 Aug 2017 20:06)      SUBJECTIVE / OVERNIGHT EVENTS: No chest pain. No shortness of breath. No complaints. No events overnight.     MEDICATIONS  (STANDING):  atorvastatin 40 milliGRAM(s) Oral at bedtime  aspirin  chewable 81 milliGRAM(s) Oral daily  meropenem IVPB   IV Intermittent   meropenem IVPB 1000 milliGRAM(s) IV Intermittent every 8 hours  famotidine    Tablet 20 milliGRAM(s) Oral daily  diltiazem Infusion 10 mG/Hr (10 mL/Hr) IV Continuous <Continuous>  metoprolol 50 milliGRAM(s) Oral two times a day  diltiazem    Tablet 30 milliGRAM(s) Oral every 6 hours  heparin  Infusion 1000 Unit(s)/Hr (10 mL/Hr) IV Continuous <Continuous>    MEDICATIONS  (PRN):  oxyCODONE    5 mG/acetaminophen 325 mG 2 Tablet(s) Oral every 4 hours PRN Severe Pain (7 - 10)  oxyCODONE    5 mG/acetaminophen 325 mG 1 Tablet(s) Oral every 4 hours PRN Moderate Pain (4 - 6)  acetaminophen   Tablet 975 milliGRAM(s) Oral every 6 hours PRN mild pain      Vital Signs Last 24 Hrs  T(C): 36.7 (30 Aug 2017 13:37), Max: 36.7 (29 Aug 2017 20:10)  T(F): 98.1 (30 Aug 2017 13:37), Max: 98.1 (30 Aug 2017 13:37)  HR: 60 (30 Aug 2017 14:29) (60 - 89)  BP: 120/69 (30 Aug 2017 14:29) (114/57 - 149/70)  BP(mean): --  RR: 18 (30 Aug 2017 13:37) (16 - 18)  SpO2: 99% (30 Aug 2017 14:29) (96% - 100%)  CAPILLARY BLOOD GLUCOSE        I&O's Summary    29 Aug 2017 07:01  -  30 Aug 2017 07:00  --------------------------------------------------------  IN: 1198 mL / OUT: 2150 mL / NET: -952 mL    30 Aug 2017 07:01  -  30 Aug 2017 15:53  --------------------------------------------------------  IN: 395 mL / OUT: 660 mL / NET: -265 mL        PHYSICAL EXAM:  GENERAL: NAD, well-developed  HEAD:  Atraumatic, Normocephalic  EYES: EOMI, PERRLA, conjunctiva and sclera clear  NECK: Supple, No JVD  CHEST/LUNG: Clear to auscultation bilaterally; No wheeze  HEART: Regular rate and rhythm; No murmurs, rubs, or gallops  ABDOMEN: Soft, Nontender, Nondistended; Bowel sounds present  EXTREMITIES:  2+ Peripheral Pulses, No clubbing, cyanosis, or edema  PSYCH: AAOx3  NEUROLOGY: non-focal  SKIN: No rashes or lesions    LABS:                        9.4    17.6  )-----------( 440      ( 30 Aug 2017 04:01 )             28.9     08-30    142  |  101  |  9   ----------------------------<  103<H>  3.7   |  27  |  0.41<L>    Ca    7.9<L>      30 Aug 2017 07:38  Phos  2.3     08-30  Mg     1.7     08-30      PT/INR - ( 29 Aug 2017 02:26 )   PT: 14.3 sec;   INR: 1.30 ratio         PTT - ( 30 Aug 2017 12:10 )  PTT:28.8 sec      Urinalysis Basic - ( 29 Aug 2017 14:08 )    Color: Yellow / Appearance: Clear / S.009 / pH: x  Gluc: x / Ketone: Negative  / Bili: Negative / Urobili: 4   Blood: x / Protein: 30 mg/dL / Nitrite: Negative   Leuk Esterase: Negative / RBC: >50 /HPF / WBC 25-50 /HPF   Sq Epi: x / Non Sq Epi: x / Bacteria: x        RADIOLOGY & ADDITIONAL TESTS:    Imaging Personally Reviewed:    Consultant(s) Notes Reviewed:      Care Discussed with Consultants/Other Providers:

## 2017-08-30 NOTE — PROGRESS NOTE ADULT - ASSESSMENT
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 blood cx negative x 24, can place PCC  2. continue meropenem for now.  leukocytosis had just started to trend down.  (hope to change to unasyn soon)  3. f/u blood cx & trend WBC  4. will need ~3weeks of IV abx post VATS    I will be away as of 8/31/17.  ID service will follow.  Please call ID office at [phone] for issues.

## 2017-08-30 NOTE — PROGRESS NOTE ADULT - ASSESSMENT
72 year old female with htn, fracture of Left arm, GERD, HLD admitted for new onset  Afib with RVR/ PNA/ s/p thoracotomy for empyema         1. Cv stable post op  currently Afib   more rate controlled today   cardizem gtt continues   Transition to PO cardizem, start with cardizem 30 mg PO q 8 hrs   continue with  metoprolol to 50mg BID  cont a/c hep gtt for af   continue with ASA     2. Empyema  IV abx per id  pending BC results   chest tube continue  pending chest xray   care per Thoracic surgery   pulm f.u       DVT ppx

## 2017-08-30 NOTE — PROGRESS NOTE ADULT - SUBJECTIVE AND OBJECTIVE BOX
CARDIOLOGY FOLLOW UP - Dr. Grant    CC no chest pain or sob       PHYSICAL EXAM:  T(C): 36.5 (08-30-17 @ 10:00), Max: 36.8 (08-29-17 @ 14:10)  HR: 82 (08-30-17 @ 10:00) (72 - 89)  BP: 127/53 (08-30-17 @ 10:00) (116/54 - 149/70)  RR: 18 (08-30-17 @ 10:00) (16 - 18)  SpO2: 96% (08-30-17 @ 10:00) (96% - 100%)  Wt(kg): --  I&O's Summary    29 Aug 2017 07:01  -  30 Aug 2017 07:00  --------------------------------------------------------  IN: 1198 mL / OUT: 2150 mL / NET: -952 mL    30 Aug 2017 07:01  -  30 Aug 2017 11:51  --------------------------------------------------------  IN: 240 mL / OUT: 0 mL / NET: 240 mL        Appearance: Normal	  Cardiovascular: Normal S1 S2,irregular   Respiratory: rhonchi R  > L  + chest tubes   Gastrointestinal:  Soft, Non-tender, + BS	  Extremities: Normal range of motion, + 2 bl LE  edema        MEDICATIONS  (STANDING):  atorvastatin 40 milliGRAM(s) Oral at bedtime  aspirin  chewable 81 milliGRAM(s) Oral daily  meropenem IVPB   IV Intermittent   meropenem IVPB 1000 milliGRAM(s) IV Intermittent every 8 hours  famotidine    Tablet 20 milliGRAM(s) Oral daily  diltiazem Infusion 10 mG/Hr (10 mL/Hr) IV Continuous <Continuous>  metoprolol 50 milliGRAM(s) Oral two times a day  heparin  Infusion 900 Unit(s)/Hr (9 mL/Hr) IV Continuous <Continuous>      TELEMETRY: Afib HR 70 - 80   yesterday Afib with RVR hr 150- 120 	    ECG:  	  RADIOLOGY:   DIAGNOSTIC TESTING:  [ ] Echocardiogram:  [ ]  Catheterization:  [ ] Stress Test:    OTHER: 	  < from: Xray Chest 1 View AP- PORTABLE-Urgent (08.29.17 @ 14:14) >    IMPRESSION:   Right-sided chest tubes.  Bilateral pleural effusions, larger on right.  No pneumothorax.        < end of copied text >    LABS:	 	                                9.4    17.6  )-----------( 440      ( 30 Aug 2017 04:01 )             28.9     08-30    142  |  101  |  9   ----------------------------<  103<H>  3.7   |  27  |  0.41<L>    Ca    7.9<L>      30 Aug 2017 07:38  Phos  2.3     08-30  Mg     1.7     08-30      PT/INR - ( 29 Aug 2017 02:26 )   PT: 14.3 sec;   INR: 1.30 ratio         PTT - ( 30 Aug 2017 04:01 )  PTT:71.2 sec

## 2017-08-30 NOTE — PROGRESS NOTE ADULT - ASSESSMENT
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 - procedure had to be converted to formal thoracotomy due to dense adhesions and subpulmonic location of the process    post-op confusion (resolved) - perhaps related to pain secondary to urinary retention    recurrent atrial fibrillation with RVR now with rate control on IV cardizem and oral beta blockers    left humeral fracture    PLAN/RECOMMENDATIONS    oxygen supplementation to keep saturation greater than 92%  blood cultures  on meropenem - ID evaluation for deescalation of antibiotics (?) unasyn - will need a three week course from the time of surgery - PICC line  orthopedic follow-up - it is unclear when the humeral fracture can be repaired (ie does the full course of antibiotics need to be completed)  pain control/incentive spirometry  chest tubes to low wall suction - follow air leak and CXR - management as per CT surgery  AF rate control  - cardizem IV/oral beta blockers/restarted on full dose heparin gtt - watch for post-operative bleeding - GI prophylaxis on A/C        Will follow with you;     Truman Arellano MD, [facility] - [phone]  Pulmonary Medicine

## 2017-08-30 NOTE — PROGRESS NOTE ADULT - ASSESSMENT
71 y/o female with PMhx of HTN, HLD, gerd, insomnia s/p mechanical fall in 5/2017 with left humeral fracture sent from preop testing for Afib with RVR on 8/22. Pt scheduled to undergo ORIF of left upper extremity tomorrow, now admitted for workup of Afib/rvr/sepsis. 8/23 CTA Chest w/IV cont-Large multiloculated right pleural effusion with pleural thickening, concerning for possible infectious or neoplastic etiology. Compressive partial atelectasis of the right lower and middle lobes. Pt s/p 8/24/17 right VATS to open thoracotomy for evacuation or R chest empyema and decortication.     8/28   trf too floor,  MEG    placed on cardizem gtt and AC with heparin,   3 chest tubes to h20  placed back on water lws  8/29  tube number 2 taken out  8/30   2 tubes with a/l  placed on water seal

## 2017-08-30 NOTE — PROGRESS NOTE ADULT - PROBLEM SELECTOR PLAN 2
-Found to be in sepsis secondary to community acquired rll pna with fever, leukocytosis, right lung consolidation on imaging  -Pt with extensive smoking hx, now with weight loss/poor appetite, never had screening CT chest  -Check CT chest for further evaluation of right sided consolidation/ opcification/ r/o underlying malignancy  -IV levaquin   -Follow up blood cultures  -Check urine legionella  -Trend wbc/temp curve  -Check ua/ urine culture as part of sepsis workup/ exclude uti
-Found to be in sepsis secondary to community acquired rll pna with fever, leukocytosis, right lung consolidation on imaging  -Pt with extensive smoking hx, now with weight loss/poor appetite, never had screening CT chest  -Check CT chest for further evaluation of right sided consolidation/ opcification/ r/o underlying malignancy  -IV levaquin   -Follow up blood cultures  -Check urine legionella  -Trend wbc/temp curve  -Check ua/ urine culture as part of sepsis workup/ exclude uti
Continue Heparin drip. Cardizem drip for rate control.  No bolus. Goal ptt 50-60. Monitor for bleeding.
Continue Heparin drip. Cardizem drip for rate control.  No bolus. Goal ptt 50-60. Monitor for bleeding.
IV abx  s/p thoracentesis
-Wean cardizem drip to oral cardizem, uptitrate oral as needed (currently on 30 q6h, increased last night from 30mg q8h), cardiology following, appreciate recommendations  -Continue heparin drip for anticoagulation, PTT goal 50-60 (per thoracic); start warfarin when OK per MD Cummins
May resume heparin drip today per Dr. Cummins.  No bolus. Goal ptt 50-60. Monitor for bleeding.

## 2017-08-31 LAB
ANION GAP SERPL CALC-SCNC: 14 MMOL/L — SIGNIFICANT CHANGE UP (ref 5–17)
ANION GAP SERPL CALC-SCNC: 15 MMOL/L — SIGNIFICANT CHANGE UP (ref 5–17)
APTT BLD: 47.4 SEC — HIGH (ref 27.5–37.4)
APTT BLD: 52 SEC — HIGH (ref 27.5–37.4)
BUN SERPL-MCNC: 10 MG/DL — SIGNIFICANT CHANGE UP (ref 7–23)
BUN SERPL-MCNC: 11 MG/DL — SIGNIFICANT CHANGE UP (ref 7–23)
CALCIUM SERPL-MCNC: 8.5 MG/DL — SIGNIFICANT CHANGE UP (ref 8.4–10.5)
CALCIUM SERPL-MCNC: 9.1 MG/DL — SIGNIFICANT CHANGE UP (ref 8.4–10.5)
CHLORIDE SERPL-SCNC: 101 MMOL/L — SIGNIFICANT CHANGE UP (ref 96–108)
CHLORIDE SERPL-SCNC: 98 MMOL/L — SIGNIFICANT CHANGE UP (ref 96–108)
CO2 SERPL-SCNC: 28 MMOL/L — SIGNIFICANT CHANGE UP (ref 22–31)
CO2 SERPL-SCNC: 29 MMOL/L — SIGNIFICANT CHANGE UP (ref 22–31)
CREAT SERPL-MCNC: 0.32 MG/DL — LOW (ref 0.5–1.3)
CREAT SERPL-MCNC: 0.39 MG/DL — LOW (ref 0.5–1.3)
CULTURE RESULTS: SIGNIFICANT CHANGE UP
CULTURE RESULTS: SIGNIFICANT CHANGE UP
GLUCOSE SERPL-MCNC: 96 MG/DL — SIGNIFICANT CHANGE UP (ref 70–99)
GLUCOSE SERPL-MCNC: 99 MG/DL — SIGNIFICANT CHANGE UP (ref 70–99)
HCT VFR BLD CALC: 31.1 % — LOW (ref 34.5–45)
HCT VFR BLD CALC: 35.2 % — SIGNIFICANT CHANGE UP (ref 34.5–45)
HGB BLD-MCNC: 10.1 G/DL — LOW (ref 11.5–15.5)
HGB BLD-MCNC: 10.9 G/DL — LOW (ref 11.5–15.5)
INR BLD: 1.3 RATIO — HIGH (ref 0.88–1.16)
MAGNESIUM SERPL-MCNC: 1.7 MG/DL — SIGNIFICANT CHANGE UP (ref 1.6–2.6)
MCHC RBC-ENTMCNC: 28.5 PG — SIGNIFICANT CHANGE UP (ref 27–34)
MCHC RBC-ENTMCNC: 30.7 PG — SIGNIFICANT CHANGE UP (ref 27–34)
MCHC RBC-ENTMCNC: 31 GM/DL — LOW (ref 32–36)
MCHC RBC-ENTMCNC: 32.5 GM/DL — SIGNIFICANT CHANGE UP (ref 32–36)
MCV RBC AUTO: 91.9 FL — SIGNIFICANT CHANGE UP (ref 80–100)
MCV RBC AUTO: 94.4 FL — SIGNIFICANT CHANGE UP (ref 80–100)
PHOSPHATE SERPL-MCNC: 1.9 MG/DL — LOW (ref 2.5–4.5)
PLATELET # BLD AUTO: 465 K/UL — HIGH (ref 150–400)
PLATELET # BLD AUTO: 583 K/UL — HIGH (ref 150–400)
POTASSIUM SERPL-MCNC: 3.6 MMOL/L — SIGNIFICANT CHANGE UP (ref 3.5–5.3)
POTASSIUM SERPL-MCNC: 3.9 MMOL/L — SIGNIFICANT CHANGE UP (ref 3.5–5.3)
POTASSIUM SERPL-SCNC: 3.6 MMOL/L — SIGNIFICANT CHANGE UP (ref 3.5–5.3)
POTASSIUM SERPL-SCNC: 3.9 MMOL/L — SIGNIFICANT CHANGE UP (ref 3.5–5.3)
PROTHROM AB SERPL-ACNC: 14.3 SEC — HIGH (ref 9.8–12.7)
RBC # BLD: 3.3 M/UL — LOW (ref 3.8–5.2)
RBC # BLD: 3.83 M/UL — SIGNIFICANT CHANGE UP (ref 3.8–5.2)
RBC # FLD: 13.6 % — SIGNIFICANT CHANGE UP (ref 10.3–14.5)
RBC # FLD: 15 % — HIGH (ref 10.3–14.5)
SODIUM SERPL-SCNC: 141 MMOL/L — SIGNIFICANT CHANGE UP (ref 135–145)
SODIUM SERPL-SCNC: 144 MMOL/L — SIGNIFICANT CHANGE UP (ref 135–145)
SPECIMEN SOURCE: SIGNIFICANT CHANGE UP
SPECIMEN SOURCE: SIGNIFICANT CHANGE UP
WBC # BLD: 17 K/UL — HIGH (ref 3.8–10.5)
WBC # BLD: 19.85 K/UL — HIGH (ref 3.8–10.5)
WBC # FLD AUTO: 17 K/UL — HIGH (ref 3.8–10.5)
WBC # FLD AUTO: 19.85 K/UL — HIGH (ref 3.8–10.5)

## 2017-08-31 PROCEDURE — 71010: CPT | Mod: 26,77

## 2017-08-31 PROCEDURE — 71010: CPT | Mod: 26

## 2017-08-31 PROCEDURE — 99232 SBSQ HOSP IP/OBS MODERATE 35: CPT

## 2017-08-31 RX ORDER — HEPARIN SODIUM 5000 [USP'U]/ML
1200 INJECTION INTRAVENOUS; SUBCUTANEOUS
Qty: 25000 | Refills: 0 | Status: DISCONTINUED | OUTPATIENT
Start: 2017-08-31 | End: 2017-09-01

## 2017-08-31 RX ORDER — ACETAMINOPHEN 500 MG
1000 TABLET ORAL ONCE
Qty: 0 | Refills: 0 | Status: COMPLETED | OUTPATIENT
Start: 2017-08-31 | End: 2017-08-31

## 2017-08-31 RX ADMIN — HEPARIN SODIUM 12 UNIT(S)/HR: 5000 INJECTION INTRAVENOUS; SUBCUTANEOUS at 04:09

## 2017-08-31 RX ADMIN — Medication 400 MILLIGRAM(S): at 09:00

## 2017-08-31 RX ADMIN — ATORVASTATIN CALCIUM 40 MILLIGRAM(S): 80 TABLET, FILM COATED ORAL at 21:52

## 2017-08-31 RX ADMIN — FAMOTIDINE 20 MILLIGRAM(S): 10 INJECTION INTRAVENOUS at 15:26

## 2017-08-31 RX ADMIN — MEROPENEM 200 MILLIGRAM(S): 1 INJECTION INTRAVENOUS at 14:31

## 2017-08-31 RX ADMIN — Medication 1000 MILLIGRAM(S): at 09:30

## 2017-08-31 RX ADMIN — Medication 81 MILLIGRAM(S): at 15:26

## 2017-08-31 RX ADMIN — MAGNESIUM OXIDE 400 MG ORAL TABLET 400 MILLIGRAM(S): 241.3 TABLET ORAL at 15:26

## 2017-08-31 RX ADMIN — MAGNESIUM OXIDE 400 MG ORAL TABLET 400 MILLIGRAM(S): 241.3 TABLET ORAL at 09:02

## 2017-08-31 RX ADMIN — MEROPENEM 200 MILLIGRAM(S): 1 INJECTION INTRAVENOUS at 05:55

## 2017-08-31 RX ADMIN — Medication 50 MILLIGRAM(S): at 05:55

## 2017-08-31 RX ADMIN — HEPARIN SODIUM 12 UNIT(S)/HR: 5000 INJECTION INTRAVENOUS; SUBCUTANEOUS at 11:59

## 2017-08-31 RX ADMIN — Medication 50 MILLIGRAM(S): at 18:35

## 2017-08-31 NOTE — PROGRESS NOTE ADULT - ASSESSMENT
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 - procedure had to be converted to formal thoracotomy due to dense adhesions and subpulmonic location of the process - appears quite weak and frail with significant weight loss    post-op confusion (resolved) - perhaps related to pain secondary to urinary retention or meds    recurrent atrial fibrillation with RVR now with rate control on po cardizem and oral beta blockers    left humeral fracture    PLAN/RECOMMENDATIONS    stable oxygenation on room air  on meropenem - ID evaluation for deescalation of antibiotics (?) unasyn - will need a three week course from the time of surgery - PICC line  orthopedic follow-up - it is unclear when the humeral fracture can be repaired (ie does the full course of antibiotics need to be completed)  pain control/incentive spirometry - needs additional analgesics - trial of IV Tylenol today to avoid confusion  chest tubes to water seal - follow air leak and CXR - management as per CT surgery - consider chest CT  AF rate controlled  - cardizem po/oral beta blockers/restarted on full dose heparin gtt - watch for post-operative bleeding - GI prophylaxis on A/C -         Will follow with you; d/w CTS PA    Truman Arellano MD, [facility] - [phone]  Pulmonary Medicine

## 2017-08-31 NOTE — PROGRESS NOTE ADULT - PROBLEM SELECTOR PLAN 1
maintain right chest tubes to water seal   anticipate Unasyn IV via PICC x 3 wks post VATS as per ID. Pending PICC placement.  daily chest xray  analgesics, maintain LUE to shoulder sling

## 2017-08-31 NOTE — PROGRESS NOTE ADULT - ASSESSMENT
72 year old female with htn, fracture of Left arm, GERD, HLD admitted for new onset  Afib with RVR/ PNA/ s/p thoracotomy for empyema         1. Cv stable post op  remains in AF  rate controlled  continue cardizem 30 mg and metoprolol 50mg BID  cont a/c hep gtt for af   continue with ASA   coumadin vs noac once ok by thoracic standpoint    2. Empyema  IV abx per id  pending BC results   CT management per thoracic   thoracic/pulmo f/u

## 2017-08-31 NOTE — PROGRESS NOTE ADULT - SUBJECTIVE AND OBJECTIVE BOX
Patient is a 72y old  Female who presents with a chief complaint of Afib (22 Aug 2017 20:06)      SUBJECTIVE / OVERNIGHT EVENTS: No chest pain. No shortness of breath. No complaints. No events overnight. had BM    MEDICATIONS  (STANDING):  atorvastatin 40 milliGRAM(s) Oral at bedtime  aspirin  chewable 81 milliGRAM(s) Oral daily  meropenem IVPB   IV Intermittent   meropenem IVPB 1000 milliGRAM(s) IV Intermittent every 8 hours  famotidine    Tablet 20 milliGRAM(s) Oral daily  metoprolol 50 milliGRAM(s) Oral two times a day  diltiazem    Tablet 30 milliGRAM(s) Oral every 6 hours  magnesium oxide 400 milliGRAM(s) Oral three times a day with meals  heparin  Infusion 1200 Unit(s)/Hr (12 mL/Hr) IV Continuous <Continuous>    MEDICATIONS  (PRN):  oxyCODONE    5 mG/acetaminophen 325 mG 2 Tablet(s) Oral every 4 hours PRN Severe Pain (7 - 10)  oxyCODONE    5 mG/acetaminophen 325 mG 1 Tablet(s) Oral every 4 hours PRN Moderate Pain (4 - 6)  acetaminophen   Tablet 975 milliGRAM(s) Oral every 6 hours PRN mild pain      Vital Signs Last 24 Hrs  T(C): 36.5 (31 Aug 2017 06:00), Max: 36.7 (30 Aug 2017 13:37)  T(F): 97.7 (31 Aug 2017 06:00), Max: 98.1 (30 Aug 2017 13:37)  HR: 94 (31 Aug 2017 06:00) (60 - 94)  BP: 166/70 (31 Aug 2017 06:00) (114/57 - 166/70)  BP(mean): --  RR: 15 (31 Aug 2017 06:00) (15 - 18)  SpO2: 97% (31 Aug 2017 06:00) (96% - 99%)  CAPILLARY BLOOD GLUCOSE        I&O's Summary    30 Aug 2017 07:  -  31 Aug 2017 07:00  --------------------------------------------------------  IN: 795 mL / OUT: 2360 mL / NET: -1565 mL    31 Aug 2017 07:01  -  31 Aug 2017 13:03  --------------------------------------------------------  IN: 0 mL / OUT: 650 mL / NET: -650 mL        PHYSICAL EXAM:  GENERAL: NAD, well-developed  HEAD:  Atraumatic, Normocephalic  EYES: EOMI, PERRLA, conjunctiva and sclera clear  NECK: Supple, No JVD  CHEST/LUNG: Clear to auscultation bilaterally; No wheeze  HEART: Regular rate and rhythm; No murmurs, rubs, or gallops  ABDOMEN: Soft, Nontender, Nondistended; Bowel sounds present  EXTREMITIES:  2+ Peripheral Pulses, No clubbing, cyanosis, or edema  PSYCH: AAOx3  NEUROLOGY: non-focal  SKIN: No rashes or lesions    LABS:                        10.1   17.0  )-----------( 465      ( 31 Aug 2017 02:56 )             31.1     08-31    144  |  101  |  11  ----------------------------<  99  3.6   |  29  |  0.32<L>    Ca    8.5      31 Aug 2017 03:22  Phos  2.3     08-30  Mg     1.7     08-30      PT/INR - ( 31 Aug 2017 02:56 )   PT: 14.3 sec;   INR: 1.30 ratio         PTT - ( 31 Aug 2017 10:39 )  PTT:52.0 sec      Urinalysis Basic - ( 29 Aug 2017 14:08 )    Color: Yellow / Appearance: Clear / S.009 / pH: x  Gluc: x / Ketone: Negative  / Bili: Negative / Urobili: 4   Blood: x / Protein: 30 mg/dL / Nitrite: Negative   Leuk Esterase: Negative / RBC: >50 /HPF / WBC 25-50 /HPF   Sq Epi: x / Non Sq Epi: x / Bacteria: x        RADIOLOGY & ADDITIONAL TESTS:    Imaging Personally Reviewed:    Consultant(s) Notes Reviewed:      Care Discussed with Consultants/Other Providers:

## 2017-08-31 NOTE — PROGRESS NOTE ADULT - SUBJECTIVE AND OBJECTIVE BOX
CARDIOLOGY FOLLOW UP NOTE - DR. PEÑA    Subjective:    no cp/sob  c/o pain at ct site    PHYSICAL EXAM:  T(C): 36.5 (08-31-17 @ 06:00), Max: 36.7 (08-30-17 @ 13:37)  HR: 94 (08-31-17 @ 06:00) (60 - 94)  BP: 166/70 (08-31-17 @ 06:00) (114/57 - 166/70)  RR: 15 (08-31-17 @ 06:00) (15 - 18)  SpO2: 97% (08-31-17 @ 06:00) (96% - 99%)  Wt(kg): --  I&O's Summary    30 Aug 2017 07:01  -  31 Aug 2017 07:00  --------------------------------------------------------  IN: 795 mL / OUT: 2360 mL / NET: -1565 mL    31 Aug 2017 07:01  -  31 Aug 2017 11:52  --------------------------------------------------------  IN: 0 mL / OUT: 650 mL / NET: -650 mL        Appearance: Normal	  Cardiovascular: Normal S1 S2, irreg  Respiratory: Lungs clear to auscultation	  Gastrointestinal:  Soft, Non-tender, + BS	  Extremities: Normal range of motion, No clubbing, cyanosis or edema    MEDICATIONS  (STANDING):  atorvastatin 40 milliGRAM(s) Oral at bedtime  aspirin  chewable 81 milliGRAM(s) Oral daily  meropenem IVPB   IV Intermittent   meropenem IVPB 1000 milliGRAM(s) IV Intermittent every 8 hours  famotidine    Tablet 20 milliGRAM(s) Oral daily  metoprolol 50 milliGRAM(s) Oral two times a day  diltiazem    Tablet 30 milliGRAM(s) Oral every 6 hours  magnesium oxide 400 milliGRAM(s) Oral three times a day with meals  heparin  Infusion 1200 Unit(s)/Hr (12 mL/Hr) IV Continuous <Continuous>      TELEMETRY: 	    ECG:  	  RADIOLOGY:   DIAGNOSTIC TESTING:  [ ] Echocardiogram:  [ ] Catheterization:  [ ] Stress Test:    OTHER: 	    LABS:	 	    CARDIAC MARKERS:                                10.1   17.0  )-----------( 465      ( 31 Aug 2017 02:56 )             31.1     08-31    144  |  101  |  11  ----------------------------<  99  3.6   |  29  |  0.32<L>    Ca    8.5      31 Aug 2017 03:22  Phos  2.3     08-30  Mg     1.7     08-30      proBNP:   PT/INR - ( 31 Aug 2017 02:56 )   PT: 14.3 sec;   INR: 1.30 ratio         PTT - ( 31 Aug 2017 10:39 )  PTT:52.0 sec  Lipid Profile:   HgA1c:

## 2017-08-31 NOTE — PROGRESS NOTE ADULT - ASSESSMENT
72 /o female h/o left spiral humeral fx, new onset afib and empyema s/p right thoracotomy decortication 8/25/17 72 /o female h/o left spiral humeral fx, new onset afib and empyema s/p Flexible bronchoscopy of both lungs VATS (video-assisted thoracoscopic surgery)    Right side drainage of empyema and decortication via right thoracotomy  performed 8/25/17.

## 2017-08-31 NOTE — PROGRESS NOTE ADULT - ASSESSMENT
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  maintain right chest tubes to water seal   anticipate Unasyn IV via PICC x 3 wks post VATS as per ID. Pending PICC placement.  daily chest xray    leucocytosis- id eval noted, picc line to be placed  Cv stable post op  af rate control Cardizem po and metoprolol  cont a/c for af   IV abx per id  left ue ext fx- ortho consult. check x ray  constipation-  Miralax daily  DVT ppx   pt eval

## 2017-08-31 NOTE — PROGRESS NOTE ADULT - SUBJECTIVE AND OBJECTIVE BOX
[facility] - [facility]     PROGRESS NOTE    CHIEF COMPLAINT: empyema    INTERVAL HISTORY: seems much thinner despite eating fairly well; significant right sided chest wall pain; out of bed to chair; walked in the hallway yesterday; at baseline mental status; abdominal discomfort resolved with placement of maxwell catheter for urinary retention; tele with AF with controlled rate; hemodynamically stable; first chest tube removed; no fevers, chills or sweats; no dyspnea on room air, chest congestion or wheeze;       REVIEW OF SYSTEMS:  Constitutional: As per interval history  HEENT: Within normal limits  CV: As per interval history  Resp: As per interval history  GI: Within normal limits   : Within normal limits  Musculoskeletal: As per interval history  Skin: Within normal limits  Neurological: Within normal limits  Psychiatric: Within normal limits  Endocrine: Within normal limits  Hematologic/Lymphatic: Within normal limits  Allergic/Immunologic: Within normal limits      MEDICATIONS:     Pulmonary "      Anti-microbials:  meropenem IVPB   IV Intermittent   meropenem IVPB 1000 milliGRAM(s) IV Intermittent every 8 hours      Cardiovascular:  metoprolol 50 milliGRAM(s) Oral two times a day  diltiazem    Tablet 30 milliGRAM(s) Oral every 6 hours      Other:  atorvastatin 40 milliGRAM(s) Oral at bedtime  aspirin  chewable 81 milliGRAM(s) Oral daily  oxyCODONE    5 mG/acetaminophen 325 mG 2 Tablet(s) Oral every 4 hours PRN  oxyCODONE    5 mG/acetaminophen 325 mG 1 Tablet(s) Oral every 4 hours PRN  famotidine    Tablet 20 milliGRAM(s) Oral daily  acetaminophen   Tablet 975 milliGRAM(s) Oral every 6 hours PRN  magnesium oxide 400 milliGRAM(s) Oral three times a day with meals  heparin  Infusion 1200 Unit(s)/Hr IV Continuous <Continuous>        OBJECTIVE:        I&O's Detail    30 Aug 2017 07:01  -  31 Aug 2017 07:00  --------------------------------------------------------  IN:    diltiazem Infusion: 120 mL    heparin Infusion: 115 mL    Oral Fluid: 560 mL  Total IN: 795 mL    OUT:    Chest Tube: 20 mL    Indwelling Catheter - Urethral: 2340 mL  Total OUT: 2360 mL    Total NET: -1565 mL       Daily Weight in k.8 (31 Aug 2017 08:00)        PHYSICAL EXAM:       ICU Vital Signs Last 24 Hrs  T(C): 36.5 (31 Aug 2017 06:00), Max: 36.7 (30 Aug 2017 13:37)  T(F): 97.7 (31 Aug 2017 06:00), Max: 98.1 (30 Aug 2017 13:37)  HR: 94 (31 Aug 2017 06:00) (60 - 94)  BP: 166/70 (31 Aug 2017 06:00) (114/57 - 166/70)  BP(mean): --  ABP: --  ABP(mean): --  RR: 15 (31 Aug 2017 06:00) (15 - 18)  SpO2: 97% (31 Aug 2017 06:00) (96% - 99%) on room air     General: Awake and alert. Sitting in chair; Moderate pain. Appears stated age 	  HEENT:   AT/NC/Anicteric. PERRL/EOMI. Normal oral mucosa,  Neck: Supple. Trachea midline. Thyroid without enlargement/tenderness/nodules. No carotid bruit. No JVD	  Cardiovascular: Irregularly irregular rate and rhythm. S1 S2 normal. II/VI systolic murmur  Respiratory: Respirations unlabored. Diffuse right sided rales ~ 1/2 up - 2 chest tubes in place; anterior tube with improved air leak  Abdomen: Soft. Non-tender. Non-distended. No organomegaly. No masses. Normal BS  Extremities: Warm to touch. Mild lower extremity and LUE edema; bilateral SCDs. LUE in hard brace  Pulses: 2+ peripheral pulses all extremities	  Skin: Thoracotomy incision bandaged - C/D/I  Lymph Nodes: Cervical, supraclavicular and axillary nodes normal  Neurological: Motor and sensory examination equal and normal. A and O x 3  Psychiatry: Appropriate mood and affect.        LABS:                        10.1   17.0  )-----------( 465      ( 31 Aug 2017 02:56 )             31.1                         9.7    18.7  )-----------( 474      ( 30 Aug 2017 20:24 )             30.1     08-31    144  |  101  |  11  ----------------------------<  99  3.6   |  29  |  0.32<L>        142  |  101  |  9   ----------------------------<  103<H>  3.7   |  27  |  0.41<L>    Ca      8.5          Ca      7.9<L>          Phos    2.3         Phos    1.9           Mg       1.7         TPro  5.1<L>  /  Alb  2.5<L>  /  TBili  0.4  /  DBili  x   /  AST  22  /  ALT  18  /  AlkPhos  62      PT/INR - ( 31 Aug 2017 02:56 )   PT: 14.3 sec;   INR: 1.30 ratio         PTT - ( 31 Aug 2017 02:56 )  PTT:47.4 sec    MICROBIOLOGY:   Urinalysis Basic - ( 29 Aug 2017 14:08 )    Color: Yellow / Appearance: Clear / S.009 / pH: x  Gluc: x / Ketone: Negative  / Bili: Negative / Urobili: 4   Blood: x / Protein: 30 mg/dL / Nitrite: Negative   Leuk Esterase: Negative / RBC: >50 /HPF / WBC 25-50 /HPF   Sq Epi: x / Non Sq Epi: x / Bacteria: x    Culture - Blood (17 @ 05:49)    Specimen Source: .Blood Blood-Peripheral    Culture Results:   No growth to date.    Culture - Blood (17 @ 02:31)    Specimen Source: .Blood Blood-Peripheral    Culture Results:   No growth to date.    Culture - Body Fluid with Gram Stain (17 @ 00:50)    Gram Stain:   polymorphonuclear leukocytes seen  Gram Negative Rods seen    Specimen Source: .Body Fluid Pleural Fluid    Culture Results:   Numerous Fusobacterium nucleatum    Culture - Body Fluid with Gram Stain (17 @ 13:05)    Gram Stain:   Rare polymorphonuclear leukocytes per low power field  No organisms seen    Specimen Source: .Body Fluid Pleural Fluid    Culture Results:   Moderate Fusobacterium nucleatum    RADIOLOGY:  [x ] Chest radiographs reviewed and interpreted by me    < from: Xray Chest 1 View AP- PORTABLE-Urgent (17 @ 11:14) >    EXAM:  CHEST-PORTABLE URGENT                            PROCEDURE DATE:  2017      INTERPRETATION:  CLINICAL INFORMATION: Rule out pneumothorax status post   right VATS decortication.    EXAM: Single frontal view chest radiograph.    COMPARISON:  Chest radiograph 2017    FINDINGS:   The cardiomediastinal silhouette cannot be accurately evaluated on this   projection.    There is a right-sided chest tube and right pleural effusion improved as   compared to the prior study.  There is a left-sided pleural effusion unchanged from the prior.     There is a small right-sided apical pneumothorax.    There are multilevel degenerative changes of the spine.    IMPRESSION:   Small right pneumothorax. Right-sided chest tube in place.    Bilateral pleural effusions right larger than left. Marginal interval   improvement in the right-sided pleural effusion.      SERGIO HOUGH M.D., RADIOLOGY RESIDENT  This document has been electronically signed.  WHIT SOLER M.D., ATTENDING RADIOLOGIST  This document has been electronically signed. Aug 30 2017  2:02PM      < end of copied text >  ---------------------------------------------------------------------------------------------------------      < from: Xray Chest 1 View AP- PORTABLE-Urgent (17 @ 14:14) >    EXAM:  CHEST-PORTABLE URGENT                            PROCEDURE DATE:  2017      INTERPRETATION:  CLINICAL INFORMATION: Status post chest tube removal.    EXAM: AP portable chest from 2017 at 2:16 PM.    COMPARISON: Chest radiograph from 2017 at 629    FINDINGS:  Paired right-sided chest tubes, unchanged in position.  Small bilateral pleural effusions.  No pneumothorax.  The cardiomediastinal silhouette is unchanged.  Redemonstration of a displaced, age indeterminant left humeral fracture.    IMPRESSION:   Right-sided chest tubes.  Bilateral pleural effusions, larger on right.  No pneumothorax.    ALYSSA SULLIVAN M.D., RADIOLOGY RESIDENT  This document has been electronically signed.  HARIS VELÁSQUEZ M.D., ATTENDING RADIOLOGIST  This document has been electronically signed. Aug 29 2017  3:43PM      < end of copied text >  ---------------------------------------------------------------------------------------------------------    < from: Xray Chest 1 View AP -PORTABLE-Routine (17 @ 06:28) >    EXAM:  CHEST PORTABLE ROUTINE                          PROCEDURE DATE:  2017      INTERPRETATION:  CLINICAL INFORMATION: Status post decortication.    EXAM: Single frontal view chest radiograph.    COMPARISON:  Chest radiograph 2017    FINDINGS:   There is right mid to lower lung opacification likely pulmonary   parenchymal disease. It is unchanged from the comparison exam. There are   2 right-sided drainage catheters.    There is tortuosity and calcification of the aorta.    There are multilevel degenerative changes of the spine.    IMPRESSION:   Right-sided opacity likely pneumonia versus asymmetric pulmonary edema    SERGIO HOUGH M.D., RADIOLOGY RESIDENT  This document has been electronically signed.  HARIS VELÁSQUEZ M.D., ATTENDING RADIOLOGIST  This document has been electronically signed. Aug 29 2017  2:39PM    ---------------------------------------------------------------------------------------------------------      < end of copied text >      < from: Xray Chest 1 View AP- PORTABLE-Urgent (17 @ 14:35) >    EXAM:  CHEST-PORTABLE URGENT                            PROCEDURE DATE:  2017            INTERPRETATION:    CLINICAL INDICATION: Evaluate pneumothorax    TECHNIQUE: Portable frontal view of the chest.    COMPARISON: Frontal chest radiograph from 2017 at 10:17 PM.    FINDINGS:   There is a right-sided IJ central venous catheter with distal tip   terminating in the region of the SVC. There are 2 right apical chest   tubes   Cardiac size is inaccurately assessed on this projection. Aortic knob   calcifications.  There is an unchanged right basilar opacity representing right lower lobe   pneumonia and/or atelectasis.  There is unchanged small right apical pneumothorax. Small bilateral   pleural effusions.  No acute osseous abnormality.      IMPRESSION:     1. Unchanged small right pneumothorax  2. Unchanged right lower lobe pneumonia and/or atelectasis  3. Small bilateral pleural effusions.    JOSHUA RICHTER M.D., RADIOLOGY RESIDENT  This document has been electronically signed.  LUCA FITZGERALD M.D.; ATTENDING RADIOLOGIST  This document has been electronically signed. Aug 29 2017  6:58AM      < end of copied text >  ---------------------------------------------------------------------------------------------------------  < from: Xray Chest 1 View AP -PORTABLE-Routine (17 @ 22:15) >    EXAM:  CHEST PORTABLE ROUTINE                            PROCEDURE DATE:  2017      INTERPRETATION:    CLINICAL INDICATION: Status post surgery    TECHNIQUE: Portable frontal view of the chest.    COMPARISON: Frontal chest radiograph from 2017 at 6:27 AM.    FINDINGS:   There is a right-sided IJ central venous catheter with distal tip   terminating in the region of the SVC. There are 2 right apical chest   tubes and a right basilar chest tube in place.  Cardiac size is within normal limits.  There is worsening right basilar opacity likely representing right lower   lobe pneumonia and/or atelectasis.  Small bilateral pleural effusions. There is redemonstration of a small   right-sided pneumothorax.  There is redemonstration of a displaced, angulated midshaft humeral   fracture      IMPRESSION:   1. Worsening right lower lobe pneumonia and/or atelectasis.   2. Small bilateral pleural effusions   3. Unchanged small right apical pneumothorax.    JOSHUA RICHTER M.D., RADIOLOGY RESIDENT  This document has been electronically signed.  LUCA FITZGERALD M.D.; ATTENDING RADIOLOGIST  This document has been electronically signed. Aug 28 2017 12:03PM      < end of copied text >  ---------------------------------------------------------------------------------------------------------  < from: CT Angio Chest w/ IV Cont (17 @ 15:24) >    EXAM:  CT ANGIO CHEST (W)AW IC                            PROCEDURE DATE:  2017        INTERPRETATION:  CLINICAL INFORMATION: Hypoxia and rapid atrial   fibrillation. Pneumonia on x-ray dated 2017.    PROCEDURE:   CT Pulmonary Angiogram was performed with intravenous contrast.     Intravenous contrast: 90 ml Omnipaque 350. 10 ml discarded.    Reconstructions were performed in axial thin, axial maximum intensity   projection, sagittal and coronal planes.    COMPARISON: Chest x-raydated 2017 and 2017. MRI abdomen   2009.    FINDINGS:    LUNGS AND LARGE AIRWAYS: Patent central airways. Compressive atelectasis   of the bilateral lower lobes, greater on the right, and the right middle   lobe. Foci of peripherally oriented groundglass opacities in the left   upper lobe. Small amount of paraseptal emphysema is seen in the left   upper lobe.  PLEURA: Large multiloculated right pleural effusion with thickened and   mildly hyperdense peripheral wall, indicative of pleural thickening.   Small layering left pleural effusion.  VESSELS: No evidence of pulmonary embolism. Atherosclerotic   calcifications of the thoracic aorta and coronary arteries.  HEART: There is mild dilatation of the left atrium. No pericardial   effusion.  MEDIASTINUM AND JEAN PIERRE: No lymphadenopathy.  CHEST WALL AND LOWER NECK: Within normal limits.  UPPER ABDOMEN: Partially visualized peripherally calcified right renal   cyst, as seen on prior MRI abdomen dated 2009, with increased   calcifications from prior study. Unchanged hepatic calcification. Status   post cholecystectomy.  BONES: Degenerative spondylosis of the spine. Partially visualized   comminuted spiral fracture of the left humeral mid to distal shaft.    IMPRESSION:    No evidence of pulmonary embolism.    Large multiloculated right pleural effusion with pleural thickening,   concerning for possible infectious or neoplastic etiology. Compressive   partial atelectasis of the right lower and middle lobes.    Small left pleural effusion with associated compressive atelectasis of   the left lower lobe.    Patchy groundglass opacity in the left upper lobes which may be   infectious or related to pulmonary edema given the presence of pleural   effusion.    Partially visualized acute comminuted spiral fracture of the left humeral   mid to distal shaft.     SAVAGE CID M.D., RADIOLOGY RESIDENT  This document has been electronically signed.  JEANETH ROBERTS M.D. ATTENDING RADIOLOGIST  This document has beenelectronically signed. Aug 23 2017  4:29PM

## 2017-08-31 NOTE — CHART NOTE - NSCHARTNOTEFT_GEN_A_CORE
MALNUTRITION FOLLOW UP    Pt seen for malnutrition follow up.  Source: Patient [X]    Family [ ]     other: chat, PCA [X]    Diet : Regular + ensure      Patient reports [ ] nausea  [ ] vomiting [X] diarrhea [ ] constipation  [ ]chewing problems [ ] swallowing issues  [X] other: Last bout reported today     PO intake:  [X]< 50% [ ] 50-75% [ ]% [X] other : Pt reports varied po intake, overall <50% completion of meals. Pt reports drinking ensure bid.     Source for PO intake [X] Patient [ ] family [ ] chart [ ] staff [ ] other      Current Weight: 131.8pounds  % Weight Change - fluctuation noted, ?accuracy of bed v. standing v. fluid shifts.    Pertinent Medications: MEDICATIONS  (STANDING):  atorvastatin 40 milliGRAM(s) Oral at bedtime  aspirin  chewable 81 milliGRAM(s) Oral daily  meropenem IVPB   IV Intermittent   meropenem IVPB 1000 milliGRAM(s) IV Intermittent every 8 hours  famotidine    Tablet 20 milliGRAM(s) Oral daily  metoprolol 50 milliGRAM(s) Oral two times a day  diltiazem    Tablet 30 milliGRAM(s) Oral every 6 hours  magnesium oxide 400 milliGRAM(s) Oral three times a day with meals  heparin  Infusion 1200 Unit(s)/Hr (12 mL/Hr) IV Continuous <Continuous>    MEDICATIONS  (PRN):  oxyCODONE    5 mG/acetaminophen 325 mG 2 Tablet(s) Oral every 4 hours PRN Severe Pain (7 - 10)  oxyCODONE    5 mG/acetaminophen 325 mG 1 Tablet(s) Oral every 4 hours PRN Moderate Pain (4 - 6)  acetaminophen   Tablet 975 milliGRAM(s) Oral every 6 hours PRN mild pain    Pertinent Labs:  08-31 Na141 mmol/L Glu 96 mg/dL K+ 3.9 mmol/L Cr  0.39 mg/dL<L> BUN 10 mg/dL Phos 1.9 mg/dL<L> Alb n/a   PAB n/a         Skin: no pressure injuries noted  Edema: +2b/l leg, left arm  BM: today    Estimated Needs:   [X] no change since previous assessment  [ ] recalculated:       Previous Nutrition Diagnosis: [X] Malnutrition          Nutrition Diagnosis is [X] ongoing  [ ] resolved [ ] not applicable          New Nutrition Diagnosis: [X] not applicable       Interventions:     Recommend    [ ] Change Diet To:    [X] Nutrition Supplement: Recommend increase to Ensure Enlive tid (Provides additional 1050kcal, 60gm Protein) - Pt prefers strawberry    [ ] Nutrition Support    [X] Other: Continue regular diet, encourage good po intake       Monitoring and Evaluation:     [X] PO intake [X] Tolerance to diet prescription [X] weights [X] follow up per protocol    [X] other: RD remains available Anaid Leija MBA RDN CDN Pager [phone]

## 2017-08-31 NOTE — PROGRESS NOTE ADULT - SUBJECTIVE AND OBJECTIVE BOX
VITAL SIGNS    Telemetry:  Afib 70-80  Vital Signs Last 24 Hrs  T(C): 36.5   HR: 94   BP: 166/70   RR: 15   SpO2: 97%            08-30 @ 07:01 - 08-31 @ 07:00  --------------------------------------------------------  IN: 795 mL / OUT: 2360 mL / NET: -1565 mL    08-31 @ 07:01 - 08-31 @ 10:31  --------------------------------------------------------  IN: 0 mL / OUT: 650 mL / NET: -650 mL    PHYSICAL EXAM    Subjective: OOB to chair, NAD  Neurology: alert and oriented x 3, nonfocal, no gross deficits  CV : S1S2 irreg  Sternal Wound :  CDI , Stable  Lungs:CTA diminished to right base right pleural  #1 posterior -20cc +airleak                                                      right pleural  #3  anterior  -o cc  + air leak                                     Abdomen: soft, NT,ND, (- )BM  :  voiding  Extremities: LUE shoulder splint + radial pulse  -c/c/e        LABS  08-31    144  |  101  |  11  ----------------------------<  99  3.6   |  29  |  0.32<L>    Ca    8.5      31 Aug 2017 03:22  Phos  2.3     08-30  Mg     1.7     08-30                                   10.1   17.0  )-----------( 465      ( 31 Aug 2017 02:56 )             31.1          PT/INR - ( 31 Aug 2017 02:56 )   PT: 14.3 sec;   INR: 1.30 ratio         PTT - ( 31 Aug 2017 02:56 )  PTT:47.4 sec

## 2017-09-01 LAB
ANION GAP SERPL CALC-SCNC: 10 MMOL/L — SIGNIFICANT CHANGE UP (ref 5–17)
APTT BLD: 39.2 SEC — HIGH (ref 27.5–37.4)
APTT BLD: 66.9 SEC — HIGH (ref 27.5–37.4)
BUN SERPL-MCNC: 10 MG/DL — SIGNIFICANT CHANGE UP (ref 7–23)
CALCIUM SERPL-MCNC: 8.6 MG/DL — SIGNIFICANT CHANGE UP (ref 8.4–10.5)
CHLORIDE SERPL-SCNC: 99 MMOL/L — SIGNIFICANT CHANGE UP (ref 96–108)
CO2 SERPL-SCNC: 33 MMOL/L — HIGH (ref 22–31)
CREAT SERPL-MCNC: 0.35 MG/DL — LOW (ref 0.5–1.3)
GLUCOSE SERPL-MCNC: 102 MG/DL — HIGH (ref 70–99)
HCT VFR BLD CALC: 31.5 % — LOW (ref 34.5–45)
HGB BLD-MCNC: 10.2 G/DL — LOW (ref 11.5–15.5)
INR BLD: 1.24 RATIO — HIGH (ref 0.88–1.16)
MCHC RBC-ENTMCNC: 30.7 PG — SIGNIFICANT CHANGE UP (ref 27–34)
MCHC RBC-ENTMCNC: 32.4 GM/DL — SIGNIFICANT CHANGE UP (ref 32–36)
MCV RBC AUTO: 94.7 FL — SIGNIFICANT CHANGE UP (ref 80–100)
PLATELET # BLD AUTO: 491 K/UL — HIGH (ref 150–400)
POTASSIUM SERPL-MCNC: 3.2 MMOL/L — LOW (ref 3.5–5.3)
POTASSIUM SERPL-SCNC: 3.2 MMOL/L — LOW (ref 3.5–5.3)
PROTHROM AB SERPL-ACNC: 13.5 SEC — HIGH (ref 9.8–12.7)
RBC # BLD: 3.33 M/UL — LOW (ref 3.8–5.2)
RBC # FLD: 14.4 % — SIGNIFICANT CHANGE UP (ref 10.3–14.5)
SODIUM SERPL-SCNC: 142 MMOL/L — SIGNIFICANT CHANGE UP (ref 135–145)
WBC # BLD: 14 K/UL — HIGH (ref 3.8–10.5)
WBC # FLD AUTO: 14 K/UL — HIGH (ref 3.8–10.5)

## 2017-09-01 PROCEDURE — 71010: CPT | Mod: 26

## 2017-09-01 PROCEDURE — 71010: CPT | Mod: 26,77

## 2017-09-01 PROCEDURE — 99232 SBSQ HOSP IP/OBS MODERATE 35: CPT

## 2017-09-01 PROCEDURE — 73060 X-RAY EXAM OF HUMERUS: CPT | Mod: 26,LT

## 2017-09-01 RX ORDER — MORPHINE SULFATE 50 MG/1
0.5 CAPSULE, EXTENDED RELEASE ORAL EVERY 6 HOURS
Qty: 0 | Refills: 0 | Status: DISCONTINUED | OUTPATIENT
Start: 2017-09-01 | End: 2017-09-05

## 2017-09-01 RX ORDER — HEPARIN SODIUM 5000 [USP'U]/ML
1300 INJECTION INTRAVENOUS; SUBCUTANEOUS
Qty: 25000 | Refills: 0 | Status: DISCONTINUED | OUTPATIENT
Start: 2017-09-01 | End: 2017-09-02

## 2017-09-01 RX ORDER — POTASSIUM CHLORIDE 20 MEQ
40 PACKET (EA) ORAL EVERY 4 HOURS
Qty: 0 | Refills: 0 | Status: COMPLETED | OUTPATIENT
Start: 2017-09-01 | End: 2017-09-01

## 2017-09-01 RX ADMIN — ATORVASTATIN CALCIUM 40 MILLIGRAM(S): 80 TABLET, FILM COATED ORAL at 23:32

## 2017-09-01 RX ADMIN — Medication 40 MILLIEQUIVALENT(S): at 09:57

## 2017-09-01 RX ADMIN — HEPARIN SODIUM 12 UNIT(S)/HR: 5000 INJECTION INTRAVENOUS; SUBCUTANEOUS at 00:23

## 2017-09-01 RX ADMIN — MEROPENEM 200 MILLIGRAM(S): 1 INJECTION INTRAVENOUS at 06:04

## 2017-09-01 RX ADMIN — MEROPENEM 200 MILLIGRAM(S): 1 INJECTION INTRAVENOUS at 13:44

## 2017-09-01 RX ADMIN — Medication 81 MILLIGRAM(S): at 11:35

## 2017-09-01 RX ADMIN — FAMOTIDINE 20 MILLIGRAM(S): 10 INJECTION INTRAVENOUS at 11:35

## 2017-09-01 RX ADMIN — Medication 40 MILLIEQUIVALENT(S): at 13:44

## 2017-09-01 RX ADMIN — HEPARIN SODIUM 13 UNIT(S)/HR: 5000 INJECTION INTRAVENOUS; SUBCUTANEOUS at 07:48

## 2017-09-01 RX ADMIN — MEROPENEM 200 MILLIGRAM(S): 1 INJECTION INTRAVENOUS at 23:31

## 2017-09-01 RX ADMIN — MEROPENEM 200 MILLIGRAM(S): 1 INJECTION INTRAVENOUS at 00:23

## 2017-09-01 RX ADMIN — MORPHINE SULFATE 0.5 MILLIGRAM(S): 50 CAPSULE, EXTENDED RELEASE ORAL at 17:51

## 2017-09-01 RX ADMIN — Medication 50 MILLIGRAM(S): at 18:42

## 2017-09-01 RX ADMIN — MORPHINE SULFATE 0.5 MILLIGRAM(S): 50 CAPSULE, EXTENDED RELEASE ORAL at 09:19

## 2017-09-01 RX ADMIN — HEPARIN SODIUM 13 UNIT(S)/HR: 5000 INJECTION INTRAVENOUS; SUBCUTANEOUS at 16:19

## 2017-09-01 RX ADMIN — Medication 50 MILLIGRAM(S): at 06:03

## 2017-09-01 RX ADMIN — MORPHINE SULFATE 0.5 MILLIGRAM(S): 50 CAPSULE, EXTENDED RELEASE ORAL at 17:21

## 2017-09-01 NOTE — PROGRESS NOTE ADULT - SUBJECTIVE AND OBJECTIVE BOX
[facility] - [facility]     PROGRESS NOTE    CHIEF COMPLAINT: empyema    INTERVAL HISTORY: seems better; right sided chest wall pain much better; out of bed to chair; walked in the hallway yesterday; at baseline mental status; abdominal discomfort resolved with placement of maxwell catheter for urinary retention; tele with AF with controlled rate; hemodynamically stable; first chest tube removed; other tubes not removed due to persistent air leak; no fevers, chills or sweats; no dyspnea on room air, chest congestion or wheeze;     REVIEW OF SYSTEMS:  Constitutional: As per interval history  HEENT: Within normal limits  CV: As per interval history  Resp: As per interval history  GI: Within normal limits   : Within normal limits  Musculoskeletal: Within normal limits  Skin: Within normal limits  Neurological: Within normal limits  Psychiatric: Within normal limits  Endocrine: Within normal limits  Hematologic/Lymphatic: Within normal limits  Allergic/Immunologic: Within normal limits      MEDICATIONS:     Pulmonary "      Anti-microbials:  meropenem IVPB   IV Intermittent   meropenem IVPB 1000 milliGRAM(s) IV Intermittent every 8 hours      Cardiovascular:  metoprolol 50 milliGRAM(s) Oral two times a day  diltiazem    Tablet 30 milliGRAM(s) Oral every 6 hours      Other:  atorvastatin 40 milliGRAM(s) Oral at bedtime  aspirin  chewable 81 milliGRAM(s) Oral daily  oxyCODONE    5 mG/acetaminophen 325 mG 2 Tablet(s) Oral every 4 hours PRN  oxyCODONE    5 mG/acetaminophen 325 mG 1 Tablet(s) Oral every 4 hours PRN  famotidine    Tablet 20 milliGRAM(s) Oral daily  acetaminophen   Tablet 975 milliGRAM(s) Oral every 6 hours PRN  heparin  Infusion 1300 Unit(s)/Hr IV Continuous <Continuous>        OBJECTIVE:        I&O's Detail    31 Aug 2017 07:  -  01 Sep 2017 07:00  --------------------------------------------------------  IN:    heparin Infusion: 96 mL    Oral Fluid: 840 mL  Total IN: 936 mL    OUT:    Chest Tube: 10 mL    Indwelling Catheter - Urethral: 3950 mL  Total OUT: 3960 mL    Total NET: -3024 mL      01 Sep 2017 07:  -  01 Sep 2017 08:47  --------------------------------------------------------  IN:    Oral Fluid: 360 mL  Total IN: 360 mL    OUT:  Total OUT: 0 mL    Total NET: 360 mL      PHYSICAL EXAM:       ICU Vital Signs Last 24 Hrs  T(C): 36.8 (01 Sep 2017 05:32), Max: 36.8 (31 Aug 2017 20:20)  T(F): 98.2 (01 Sep 2017 05:32), Max: 98.3 (31 Aug 2017 20:20)  HR: 92 (01 Sep 2017 05:32) (72 - 102)  BP: 133/64 (01 Sep 2017 05:32) (126/61 - 167/67)  BP(mean): --  ABP: --  ABP(mean): --  RR: 18 (01 Sep 2017 05:32) (18 - 18)  SpO2: 95% (01 Sep 2017 05:32) (95% - 96%) on room air     General: Awake and alert. Comfortable in bed. Appears stated age 	  HEENT:   AT/NC/Anicteric. PERRL/EOMI. Normal oral mucosa,  Neck: Supple. Trachea midline. Thyroid without enlargement/tenderness/nodules. No carotid bruit. No JVD	  Cardiovascular: Irregularly irregular rate and rhythm. S1 S2 normal. II/VI systolic murmur  Respiratory: Respirations unlabored. Improved breath sounds on the right and decreased rales - 2 chest tubes in place; no air leak appreciated  Abdomen: Soft. Non-tender. Non-distended. No organomegaly. No masses. Normal BS. Maxwell cathter  Extremities: Warm to touch. Mild lower extremity and LUE edema; LUE in hard brace  Pulses: 2+ peripheral pulses all extremities	  Skin: Thoracotomy incision bandaged - C/D/I  Lymph Nodes: Cervical, supraclavicular and axillary nodes normal  Neurological: Motor and sensory examination equal and normal. A and O x 3  Psychiatry: Appropriate mood and affect.        LABS:                        10.2   14.0  )-----------( 491      ( 01 Sep 2017 06:16 )             31.5                         10.9   19.85 )-----------( 583      ( 31 Aug 2017 12:21 )             35.2         141  |  98  |  10  ----------------------------<  96  3.9   |  28  |  0.39<L>        144  |  101  |  11  ----------------------------<  99  3.6   |  29  |  0.32<L>    Ca      9.1      -    Ca      8.5          Phos    1.9         Phos    2.3           Mg       1.7         Mg       1.7     08-30      PT/INR - ( 01 Sep 2017 06:16 )   PT: 13.5 sec;   INR: 1.24 ratio         PTT - ( 01 Sep 2017 06:16 )  PTT:39.2 sec    MICROBIOLOGY:     Urinalysis Basic - ( 29 Aug 2017 14:08 )    Color: Yellow / Appearance: Clear / S.009 / pH: x  Gluc: x / Ketone: Negative  / Bili: Negative / Urobili: 4   Blood: x / Protein: 30 mg/dL / Nitrite: Negative   Leuk Esterase: Negative / RBC: >50 /HPF / WBC 25-50 /HPF   Sq Epi: x / Non Sq Epi: x / Bacteria: x    Culture - Blood (17 @ 05:49)    Specimen Source: .Blood Blood-Peripheral    Culture Results:   No growth to date.    Culture - Blood (17 @ 02:31)    Specimen Source: .Blood Blood-Peripheral    Culture Results:   No growth to date.    Culture - Body Fluid with Gram Stain (17 @ 00:50)    Gram Stain:   polymorphonuclear leukocytes seen  Gram Negative Rods seen    Specimen Source: .Body Fluid Pleural Fluid    Culture Results:   Numerous Fusobacterium nucleatum    Culture - Body Fluid with Gram Stain (17 @ 13:05)    Gram Stain:   Rare polymorphonuclear leukocytes per low power field  No organisms seen    Specimen Source: .Body Fluid Pleural Fluid    Culture Results:   Moderate Fusobacterium nucleatum      RADIOLOGY:  [x ] Chest radiographs reviewed and interpreted by me    < from: Xray Chest 1 View AP -PORTABLE-Routine (17 @ 09:07) >    EXAM:  CHEST PORTABLE ROUTINE                          EXAM:  CHEST-PORTABLE URGENT                            PROCEDURE DATE:  2017        INTERPRETATION:  CLINICAL INFORMATION: Chest tube on waterseal.    EXAM: AP portable chest from 2017 at 9:09 AM and 2017 at 5:22   PM.    COMPARISON: Chest radiograph from 2017 at 11:16 AM    FINDINGS:  Chest radiograph 2017 at 5:22 AM:  Paired right-sided chest tubes, unchanged in position.  Small right-sided pneumothorax.  Small right and trace left pleural effusions.  The cardiomediastinal silhouette is unchanged.  Degenerative changes of the spine.    Chest radiograph 2017 at 9:09 AM:  Stable right-sided pneumothorax with paired right-sided chest tubes.  The remainder of the examination is also unchanged.    IMPRESSION:   Right-sided chest tubes.  Small right-sided pneumothorax.  Small right and trace left pleural effusions.    ALYSSA SULLIVAN M.D., RADIOLOGY RESIDENT  This document has been electronically signed.  HARIS VELÁSQUEZ M.D., ATTENDING RADIOLOGIST  This document has been electronically signed. Aug 31 2017 12:20PM      < end of copied text >    ---------------------------------------------------------------------------------------------------------    < from: Xray Chest 1 View AP- PORTABLE-Urgent (17 @ 11:14) >    EXAM:  CHEST-PORTABLE URGENT                            PROCEDURE DATE:  2017      INTERPRETATION:  CLINICAL INFORMATION: Rule out pneumothorax status post   right VATS decortication.    EXAM: Single frontal view chest radiograph.    COMPARISON:  Chest radiograph 2017    FINDINGS:   The cardiomediastinal silhouette cannot be accurately evaluated on this   projection.    There is a right-sided chest tube and right pleural effusion improved as   compared to the prior study.  There is a left-sided pleural effusion unchanged from the prior.     There is a small right-sided apical pneumothorax.    There are multilevel degenerative changes of the spine.    IMPRESSION:   Small right pneumothorax. Right-sided chest tube in place.    Bilateral pleural effusions right larger than left. Marginal interval   improvement in the right-sided pleural effusion.      SERGIO HOUGH M.D., RADIOLOGY RESIDENT  This document has been electronically signed.  WHIT SOLER M.D., ATTENDING RADIOLOGIST  This document has been electronically signed. Aug 30 2017  2:02PM      < end of copied text >  ---------------------------------------------------------------------------------------------------------      < from: Xray Chest 1 View AP- PORTABLE-Urgent (17 @ 14:14) >    EXAM:  CHEST-PORTABLE URGENT                            PROCEDURE DATE:  2017      INTERPRETATION:  CLINICAL INFORMATION: Status post chest tube removal.    EXAM: AP portable chest from 2017 at 2:16 PM.    COMPARISON: Chest radiograph from 2017 at 629    FINDINGS:  Paired right-sided chest tubes, unchanged in position.  Small bilateral pleural effusions.  No pneumothorax.  The cardiomediastinal silhouette is unchanged.  Redemonstration of a displaced, age indeterminant left humeral fracture.    IMPRESSION:   Right-sided chest tubes.  Bilateral pleural effusions, larger on right.  No pneumothorax.    ALYSSA SULLIVAN M.D., RADIOLOGY RESIDENT  This document has been electronically signed.  HARIS VELÁSQUEZ M.D., ATTENDING RADIOLOGIST  This document has been electronically signed. Aug 29 2017  3:43PM      < end of copied text >  ---------------------------------------------------------------------------------------------------------    < from: Xray Chest 1 View AP -PORTABLE-Routine (17 @ 06:28) >    EXAM:  CHEST PORTABLE ROUTINE                          PROCEDURE DATE:  2017      INTERPRETATION:  CLINICAL INFORMATION: Status post decortication.    EXAM: Single frontal view chest radiograph.    COMPARISON:  Chest radiograph 2017    FINDINGS:   There is right mid to lower lung opacification likely pulmonary   parenchymal disease. It is unchanged from the comparison exam. There are   2 right-sided drainage catheters.    There is tortuosity and calcification of the aorta.    There are multilevel degenerative changes of the spine.    IMPRESSION:   Right-sided opacity likely pneumonia versus asymmetric pulmonary edema    SERGIO HOUGH M.D., RADIOLOGY RESIDENT  This document has been electronically signed.  HARIS VELÁSQUEZ M.D., ATTENDING RADIOLOGIST  This document has been electronically signed. Aug 29 2017  2:39PM    ---------------------------------------------------------------------------------------------------------      < end of copied text >      < from: Xray Chest 1 View AP- PORTABLE-Urgent (17 @ 14:35) >    EXAM:  CHEST-PORTABLE URGENT                            PROCEDURE DATE:  2017            INTERPRETATION:    CLINICAL INDICATION: Evaluate pneumothorax    TECHNIQUE: Portable frontal view of the chest.    COMPARISON: Frontal chest radiograph from 2017 at 10:17 PM.    FINDINGS:   There is a right-sided IJ central venous catheter with distal tip   terminating in the region of the SVC. There are 2 right apical chest   tubes   Cardiac size is inaccurately assessed on this projection. Aortic knob   calcifications.  There is an unchanged right basilar opacity representing right lower lobe   pneumonia and/or atelectasis.  There is unchanged small right apical pneumothorax. Small bilateral   pleural effusions.  No acute osseous abnormality.      IMPRESSION:     1. Unchanged small right pneumothorax  2. Unchanged right lower lobe pneumonia and/or atelectasis  3. Small bilateral pleural effusions.    JOSHUA RICHTER M.D., RADIOLOGY RESIDENT  This document has been electronically signed.  LUCA FITZGERALD M.D.; ATTENDING RADIOLOGIST  This document has been electronically signed. Aug 29 2017  6:58AM      < end of copied text >  ---------------------------------------------------------------------------------------------------------  < from: Xray Chest 1 View AP -PORTABLE-Routine (17 @ 22:15) >    EXAM:  CHEST PORTABLE ROUTINE                            PROCEDURE DATE:  2017      INTERPRETATION:    CLINICAL INDICATION: Status post surgery    TECHNIQUE: Portable frontal view of the chest.    COMPARISON: Frontal chest radiograph from 2017 at 6:27 AM.    FINDINGS:   There is a right-sided IJ central venous catheter with distal tip   terminating in the region of the SVC. There are 2 right apical chest   tubes and a right basilar chest tube in place.  Cardiac size is within normal limits.  There is worsening right basilar opacity likely representing right lower   lobe pneumonia and/or atelectasis.  Small bilateral pleural effusions. There is redemonstration of a small   right-sided pneumothorax.  There is redemonstration of a displaced, angulated midshaft humeral   fracture      IMPRESSION:   1. Worsening right lower lobe pneumonia and/or atelectasis.   2. Small bilateral pleural effusions   3. Unchanged small right apical pneumothorax.    JOSHUA RICHTER M.D., RADIOLOGY RESIDENT  This document has been electronically signed.  LUCA FITZGERALD M.D.; ATTENDING RADIOLOGIST  This document has been electronically signed. Aug 28 2017 12:03PM      < end of copied text >  ---------------------------------------------------------------------------------------------------------  < from: CT Angio Chest w/ IV Cont (17 @ 15:24) >    EXAM:  CT ANGIO CHEST (W)AW IC                            PROCEDURE DATE:  2017        INTERPRETATION:  CLINICAL INFORMATION: Hypoxia and rapid atrial   fibrillation. Pneumonia on x-ray dated 2017.    PROCEDURE:   CT Pulmonary Angiogram was performed with intravenous contrast.     Intravenous contrast: 90 ml Omnipaque 350. 10 ml discarded.    Reconstructions were performed in axial thin, axial maximum intensity   projection, sagittal and coronal planes.    COMPARISON: Chest x-raydated 2017 and 2017. MRI abdomen   2009.    FINDINGS:    LUNGS AND LARGE AIRWAYS: Patent central airways. Compressive atelectasis   of the bilateral lower lobes, greater on the right, and the right middle   lobe. Foci of peripherally oriented groundglass opacities in the left   upper lobe. Small amount of paraseptal emphysema is seen in the left   upper lobe.  PLEURA: Large multiloculated right pleural effusion with thickened and   mildly hyperdense peripheral wall, indicative of pleural thickening.   Small layering left pleural effusion.  VESSELS: No evidence of pulmonary embolism. Atherosclerotic   calcifications of the thoracic aorta and coronary arteries.  HEART: There is mild dilatation of the left atrium. No pericardial   effusion.  MEDIASTINUM AND JEAN PIERRE: No lymphadenopathy.  CHEST WALL AND LOWER NECK: Within normal limits.  UPPER ABDOMEN: Partially visualized peripherally calcified right renal   cyst, as seen on prior MRI abdomen dated 2009, with increased   calcifications from prior study. Unchanged hepatic calcification. Status   post cholecystectomy.  BONES: Degenerative spondylosis of the spine. Partially visualized   comminuted spiral fracture of the left humeral mid to distal shaft.    IMPRESSION:    No evidence of pulmonary embolism.    Large multiloculated right pleural effusion with pleural thickening,   concerning for possible infectious or neoplastic etiology. Compressive   partial atelectasis of the right lower and middle lobes.    Small left pleural effusion with associated compressive atelectasis of   the left lower lobe.    Patchy groundglass opacity in the left upper lobes which may be   infectious or related to pulmonary edema given the presence of pleural   effusion.    Partially visualized acute comminuted spiral fracture of the left humeral   mid to distal shaft.     SAVAGE CID M.D., RADIOLOGY RESIDENT  This document has been electronically signed.  JEANETH ROBERTS M.D. ATTENDING RADIOLOGIST  This document has beenelectronically signed. Aug 23 2017  4:29PM

## 2017-09-01 NOTE — CHART NOTE - NSCHARTNOTEFT_GEN_A_CORE
72 year old female pt with HTN, fracture of Left arm, GERD, HLD admitted for new onset  Afib with RVR/ PNA/ s/p thoracotomy for empyema. Pt reports much improved po intake/appetite eating approximately 50-75% of meals, likes strawberry Ensure Enlive, requests it be increased to 3 x day. States new diet preferences.    Source: Patient [x]     Family [ ]     other [x] electronic medical records    Diet: Regular, Ensure Enlive 1 x daily (350 kcal, 20 grams protein)    Patient reports [ ] nausea  [ ] vomiting [ ] diarrhea [ ] constipation  [ ]chewing problems [ ] swallowing issues  [x] other: Pt denies GI distress at this time, last BM 8/31 yesterday    PO intake:  50-75%   Supplement intake: %    Source for PO intake [x] Patient [ ] family [x] chart [ ] staff [ ] other    Enteral /Parenteral Nutrition: n/a    Current Weight: 131.8 pounds (current as of 8/31, standing, +1 left arm, +2 B/L leg edema). Admit wt 112.6 pounds 8/22, no edema noted at that time.    Pertinent Medications: MEDICATIONS  (STANDING):  atorvastatin 40 milliGRAM(s) Oral at bedtime  aspirin  chewable 81 milliGRAM(s) Oral daily  meropenem IVPB   IV Intermittent   meropenem IVPB 1000 milliGRAM(s) IV Intermittent every 8 hours  famotidine    Tablet 20 milliGRAM(s) Oral daily  metoprolol 50 milliGRAM(s) Oral two times a day  diltiazem    Tablet 30 milliGRAM(s) Oral every 6 hours  heparin  Infusion 1300 Unit(s)/Hr (13 mL/Hr) IV Continuous <Continuous>  potassium chloride    Tablet ER 40 milliEquivalent(s) Oral every 4 hours    MEDICATIONS  (PRN):  oxyCODONE    5 mG/acetaminophen 325 mG 2 Tablet(s) Oral every 4 hours PRN Severe Pain (7 - 10)  oxyCODONE    5 mG/acetaminophen 325 mG 1 Tablet(s) Oral every 4 hours PRN Moderate Pain (4 - 6)  acetaminophen   Tablet 975 milliGRAM(s) Oral every 6 hours PRN mild pain  morphine  - Injectable 0.5 milliGRAM(s) IV Push every 6 hours PRN Moderate Pain (4 - 6)    Pertinent Labs:  09-01 Na142 mmol/L Glu 102 mg/dL<H> K+ 3.2 mmol/L<L> Cr  0.35 mg/dL<L> BUN 10 mg/dL     Skin: No pressure ulcers noted    Estimated Needs:   [x] no change since previous assessment  [ ] recalculated:     Previous Nutrition Diagnosis: Malnutrition (moderate)    Nutrition Diagnosis is [x] ongoing, improving with better po intake of meals and supplements [ ] resolved [ ] not applicable     New Nutrition Diagnosis: [x] not applicable    Interventions:     Recommend    1) Continue current regular liberalized diet order  2) Increase Ensure Enlive to 3 x daily per pt request to provide total of 1050 kcal, 60 grams protein, strawberry flavor per pt request  3) Food preferences obtained and honored on menu  4) RD remains available as needed: Diana Fraser MS, RDN, CDN, CDE. #[phone]      Monitoring and Evaluation:     [x] PO intake [x] Tolerance to diet prescription [x] weights [x] follow up per protocol    [ ] other:

## 2017-09-01 NOTE — PROGRESS NOTE ADULT - SUBJECTIVE AND OBJECTIVE BOX
infectious diseases progress note:    Patient is a 72y old  Female who presents with a chief complaint of Afib (22 Aug 2017 20:06)        Atrial fibrillation        ROS:  CONSTITUTIONAL:  Negative fever or chills, feels well, good appetite  EYES:  Negative  blurry vision or double vision  CARDIOVASCULAR:  Negative for chest pain or palpitations  RESPIRATORY:  Negative for cough, wheezing, or SOB   GASTROINTESTINAL:  Negative for nausea, vomiting, diarrhea, constipation, or abdominal pain  GENITOURINARY:  Negative frequency, urgency or dysuria  NEUROLOGIC:  No headache, confusion, dizziness, lightheadedness    Allergies    No Known Allergies    Intolerances        ANTIBIOTICS/RELEVANT:  antimicrobials  meropenem IVPB   IV Intermittent   meropenem IVPB 1000 milliGRAM(s) IV Intermittent every 8 hours    immunologic:    OTHER:  atorvastatin 40 milliGRAM(s) Oral at bedtime  aspirin  chewable 81 milliGRAM(s) Oral daily  oxyCODONE    5 mG/acetaminophen 325 mG 2 Tablet(s) Oral every 4 hours PRN  oxyCODONE    5 mG/acetaminophen 325 mG 1 Tablet(s) Oral every 4 hours PRN  famotidine    Tablet 20 milliGRAM(s) Oral daily  metoprolol 50 milliGRAM(s) Oral two times a day  acetaminophen   Tablet 975 milliGRAM(s) Oral every 6 hours PRN  diltiazem    Tablet 30 milliGRAM(s) Oral every 6 hours  heparin  Infusion 1300 Unit(s)/Hr IV Continuous <Continuous>      Objective:  Vital Signs Last 24 Hrs  T(C): 36.8 (01 Sep 2017 05:32), Max: 36.8 (31 Aug 2017 20:20)  T(F): 98.2 (01 Sep 2017 05:32), Max: 98.3 (31 Aug 2017 20:20)  HR: 92 (01 Sep 2017 05:32) (72 - 102)  BP: 133/64 (01 Sep 2017 05:32) (126/61 - 167/67)  BP(mean): --  RR: 18 (01 Sep 2017 05:32) (18 - 18)  SpO2: 95% (01 Sep 2017 05:32) (95% - 96%)    PHYSICAL EXAM:  --no acute distress  Eyes:OLENA, EOMI  Ear/Nose/Throat: no oral lesion, no sinus tenderness on percussion	  Neck:no JVD, no lymphadenopathy, supple  Respiratory: decreased bs  Gastrointestinal:soft, (+) BS, no HSM  Extremities:no e/e/c        LABS:                        10.2   14.0  )-----------( 491      ( 01 Sep 2017 06:16 )             31.5     08-31    141  |  98  |  10  ----------------------------<  96  3.9   |  28  |  0.39<L>    Ca    9.1      31 Aug 2017 12:22  Phos  1.9     08-31  Mg     1.7     08-31      PT/INR - ( 01 Sep 2017 06:16 )   PT: 13.5 sec;   INR: 1.24 ratio         PTT - ( 01 Sep 2017 06:16 )  PTT:39.2 sec        MICROBIOLOGY:    RECENT CULTURES:        RESPIRATORY CULTURES:              RADIOLOGY & ADDITIONAL STUDIES:        Pager [phone]  After 5 pm/weekends or if no response :[phone]

## 2017-09-01 NOTE — PROGRESS NOTE ADULT - SUBJECTIVE AND OBJECTIVE BOX
VITAL SIGNS    Telemetry:  Afib 70-85  T(F): 98.2   HR: 83   BP: 157/83   RR: 18   SpO2: 95%           PHYSICAL EXAM    Subjective: NAD  Neurology: alert and oriented x 3, nonfocal, no gross deficits  CV :S1S2  Sternal Wound :  CDI , Stable  Lungs:CTA right anterior chest tube - air leak -o output                  right posteroir chest tube +small air leak -110  Abdomen: soft, NT,ND, (+)BM  :  maxwell  Extremities: -c/c/e      LABS  09-01    142  |  99  |  10  ----------------------------<  102<H>  3.2<L>   |  33<H>  |  0.35<L>    Ca    8.6      01 Sep 2017 07:34  Phos  1.9     08-31  Mg     1.7     08-31                                   10.2   14.0  )-----------( 491      ( 01 Sep 2017 06:16 )             31.5          PT/INR - ( 01 Sep 2017 06:16 )   PT: 13.5 sec;   INR: 1.24 ratio         PTT - ( 01 Sep 2017 06:16 )  PTT:39.2 sec

## 2017-09-01 NOTE — PROGRESS NOTE ADULT - ASSESSMENT
72 /o female h/o left spiral humeral fx, new onset afib and empyema s/p Flexible bronchoscopy of both lungs VATS (video-assisted thoracoscopic surgery)    Right side drainage of empyema and decortication via right thoracotomy  performed 8/25/17.

## 2017-09-01 NOTE — PROGRESS NOTE ADULT - ASSESSMENT
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 blood cx negative x 24, can place PCC  2. continue meropenem for now. )  3. f/u blood cx & trend WBC  4. will need ~3weeks of IV abx post VATS  we can change to q day Invanz when ready for discharge to complete ab discussed with Dr. Corral and arm procedure to be put off  for now.   ID to cover over weekend for problems

## 2017-09-01 NOTE — PROGRESS NOTE ADULT - SUBJECTIVE AND OBJECTIVE BOX
CARDIOLOGY FOLLOW UP - Dr. Grant    CC no chest pain or sob         PHYSICAL EXAM:  T(C): 36.8 (09-01-17 @ 05:32), Max: 36.8 (08-31-17 @ 20:20)  HR: 83 (09-01-17 @ 11:15) (72 - 102)  BP: 157/83 (09-01-17 @ 11:15) (126/61 - 167/67)  RR: 18 (09-01-17 @ 05:32) (18 - 18)  SpO2: 95% (09-01-17 @ 05:32) (95% - 96%)  Wt(kg): --  I&O's Summary    31 Aug 2017 07:01  -  01 Sep 2017 07:00  --------------------------------------------------------  IN: 936 mL / OUT: 3960 mL / NET: -3024 mL    01 Sep 2017 07:01  -  01 Sep 2017 12:27  --------------------------------------------------------  IN: 360 mL / OUT: 0 mL / NET: 360 mL        Appearance: Normal	  Cardiovascular: Normal S1 S2, Irregular   Respiratory: diminished   Gastrointestinal:  Soft, Non-tender, + BS	  Extremities: Normal range of motion, No clubbing, cyanosis or edema        MEDICATIONS  (STANDING):  atorvastatin 40 milliGRAM(s) Oral at bedtime  aspirin  chewable 81 milliGRAM(s) Oral daily  meropenem IVPB   IV Intermittent   meropenem IVPB 1000 milliGRAM(s) IV Intermittent every 8 hours  famotidine    Tablet 20 milliGRAM(s) Oral daily  metoprolol 50 milliGRAM(s) Oral two times a day  diltiazem    Tablet 30 milliGRAM(s) Oral every 6 hours  heparin  Infusion 1300 Unit(s)/Hr (13 mL/Hr) IV Continuous <Continuous>  potassium chloride    Tablet ER 40 milliEquivalent(s) Oral every 4 hours      TELEMETRY: 	 Afib -115   ECG:  	  RADIOLOGY:   DIAGNOSTIC TESTING:  [ ] Echocardiogram:  [ ]  Catheterization:  [ ] Stress Test:    OTHER: 	  < from: Xray Chest 1 View AP -PORTABLE-Routine (09.01.17 @ 09:16) >  IMPRESSION:    1.  Lines and tubes as above.  2.  Trace right pneumothorax, decreased from prior radiographs.  3.  Right pleural effusion with associated atelectasis.          < end of copied text >    LABS:	 	                                10.2   14.0  )-----------( 491      ( 01 Sep 2017 06:16 )             31.5     09-01    142  |  99  |  10  ----------------------------<  102<H>  3.2<L>   |  33<H>  |  0.35<L>    Ca    8.6      01 Sep 2017 07:34  Phos  1.9     08-31  Mg     1.7     08-31      PT/INR - ( 01 Sep 2017 06:16 )   PT: 13.5 sec;   INR: 1.24 ratio         PTT - ( 01 Sep 2017 06:16 )  PTT:39.2 sec

## 2017-09-01 NOTE — PROGRESS NOTE ADULT - SUBJECTIVE AND OBJECTIVE BOX
Patient is a 72y old  Female who presents with a chief complaint of Afib (22 Aug 2017 20:06)      SUBJECTIVE / OVERNIGHT EVENTS: no new complaints    MEDICATIONS  (STANDING):  atorvastatin 40 milliGRAM(s) Oral at bedtime  aspirin  chewable 81 milliGRAM(s) Oral daily  meropenem IVPB   IV Intermittent   meropenem IVPB 1000 milliGRAM(s) IV Intermittent every 8 hours  famotidine    Tablet 20 milliGRAM(s) Oral daily  metoprolol 50 milliGRAM(s) Oral two times a day  diltiazem    Tablet 30 milliGRAM(s) Oral every 6 hours  heparin  Infusion 1300 Unit(s)/Hr (13 mL/Hr) IV Continuous <Continuous>    MEDICATIONS  (PRN):  oxyCODONE    5 mG/acetaminophen 325 mG 2 Tablet(s) Oral every 4 hours PRN Severe Pain (7 - 10)  oxyCODONE    5 mG/acetaminophen 325 mG 1 Tablet(s) Oral every 4 hours PRN Moderate Pain (4 - 6)  acetaminophen   Tablet 975 milliGRAM(s) Oral every 6 hours PRN mild pain  morphine  - Injectable 0.5 milliGRAM(s) IV Push every 6 hours PRN Moderate Pain (4 - 6)      Vital Signs Last 24 Hrs  T(C): 36.7 (01 Sep 2017 13:57), Max: 36.8 (31 Aug 2017 20:20)  T(F): 98.1 (01 Sep 2017 13:57), Max: 98.3 (31 Aug 2017 20:20)  HR: 81 (01 Sep 2017 13:57) (72 - 102)  BP: 105/55 (01 Sep 2017 13:57) (105/55 - 167/67)  BP(mean): --  RR: 18 (01 Sep 2017 13:57) (18 - 18)  SpO2: 98% (01 Sep 2017 13:57) (95% - 98%)  CAPILLARY BLOOD GLUCOSE        I&O's Summary    31 Aug 2017 07:01  -  01 Sep 2017 07:00  --------------------------------------------------------  IN: 936 mL / OUT: 3960 mL / NET: -3024 mL    01 Sep 2017 07:01  -  01 Sep 2017 15:14  --------------------------------------------------------  IN: 720 mL / OUT: 600 mL / NET: 120 mL        PHYSICAL EXAM:  GENERAL: NAD, well-developed  HEAD:  Atraumatic, Normocephalic  EYES: EOMI, PERRLA, conjunctiva and sclera clear  NECK: Supple, No JVD  CHEST/LUNG: Clear to auscultation bilaterally; No wheeze  HEART: Regular rate and rhythm; No murmurs, rubs, or gallops  ABDOMEN: Soft, Nontender, Nondistended; Bowel sounds present  EXTREMITIES:  2+ Peripheral Pulses, No clubbing, cyanosis, or edema  PSYCH: AAOx3  NEUROLOGY: non-focal  SKIN: No rashes or lesions    LABS:                        10.2   14.0  )-----------( 491      ( 01 Sep 2017 06:16 )             31.5     09-01    142  |  99  |  10  ----------------------------<  102<H>  3.2<L>   |  33<H>  |  0.35<L>    Ca    8.6      01 Sep 2017 07:34  Phos  1.9     08-31  Mg     1.7     08-31      PT/INR - ( 01 Sep 2017 06:16 )   PT: 13.5 sec;   INR: 1.24 ratio         PTT - ( 01 Sep 2017 06:16 )  PTT:39.2 sec          RADIOLOGY & ADDITIONAL TESTS:    Imaging Personally Reviewed:    Consultant(s) Notes Reviewed:      Care Discussed with Consultants/Other Providers:

## 2017-09-01 NOTE — PROGRESS NOTE ADULT - PROBLEM SELECTOR PLAN 1
maintain right chest tube to water seal   D?C anterior chest tube  anticipate Unasyn IV via PICC x 3 wks post VATS as per ID. Pending PICC placement.  daily chest xray  analgesics, maintain LUE to shoulder sling

## 2017-09-01 NOTE — PROGRESS NOTE ADULT - ASSESSMENT
72 year old female with htn, fracture of Left arm, GERD, HLD admitted for new onset  Afib with RVR/ PNA/ s/p thoracotomy for empyema         1. Cv stable post op  remains in AF  rate controlled  continue cardizem 30 mg and metoprolol 50mg BID  can increase cardizem if needed for better HR control   cont a/c hep gtt for af   continue with ASA   coumadin vs noac once ok by thoracic standpoint    2. Empyema  IV abx per id  CT management per thoracic   thoracic/pulmo f/u

## 2017-09-01 NOTE — PROGRESS NOTE ADULT - ASSESSMENT
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 - procedure had to be converted to formal thoracotomy due to dense adhesions and subpulmonic location of the process - seems to be improving now with control of infection    post-op confusion (resolved) - perhaps related to pain secondary to urinary retention or meds    recurrent atrial fibrillation with RVR now with rate control on po cardizem and oral beta blockers    left humeral fracture    PLAN/RECOMMENDATIONS    stable oxygenation on room air  on meropenem - ID evaluation for deescalation of antibiotics (?) unasyn - will need a three week course from the time of surgery - s/p PICC line placement  orthopedic follow-up - humeral fracture repair to be delayed until antibiotics completed  pain control/incentive spirometry -  IV Tylenol was very effective  chest tubes to water seal - follow air leak and CXR - management as per CT surgery - consider chest CT  AF rate controlled  - cardizem po/oral beta blockers/restarted on full dose heparin gtt - watch for post-operative bleeding - GI prophylaxis on A/C -         Will follow with you; d/w CTS PA    Truman Arellano MD, [facility] - [phone]  Pulmonary Medicine

## 2017-09-01 NOTE — PROGRESS NOTE ADULT - ASSESSMENT
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  maintain right chest tubes to water seal   anticipate Unasyn IV via PICC x 3 wks post VATS as per ID. Pending PICC placement.  daily chest xray    leucocytosis- id eval noted, picc line to be placed cw abx 3 weeks post vats  Cv stable post op  af rate control Cardizem po and metoprolol  cont a/c for af   IV abx per id  left ue ext fx- ortho consult. check x ray  constipation-  Miralax daily  DVT ppx   pt eval

## 2017-09-02 LAB
ANION GAP SERPL CALC-SCNC: 11 MMOL/L — SIGNIFICANT CHANGE UP (ref 5–17)
APTT BLD: 128.6 SEC — CRITICAL HIGH (ref 27.5–37.4)
APTT BLD: 47.6 SEC — HIGH (ref 27.5–37.4)
BUN SERPL-MCNC: 10 MG/DL — SIGNIFICANT CHANGE UP (ref 7–23)
CALCIUM SERPL-MCNC: 8.7 MG/DL — SIGNIFICANT CHANGE UP (ref 8.4–10.5)
CHLORIDE SERPL-SCNC: 99 MMOL/L — SIGNIFICANT CHANGE UP (ref 96–108)
CO2 SERPL-SCNC: 30 MMOL/L — SIGNIFICANT CHANGE UP (ref 22–31)
CREAT SERPL-MCNC: 0.39 MG/DL — LOW (ref 0.5–1.3)
GLUCOSE SERPL-MCNC: 104 MG/DL — HIGH (ref 70–99)
HCT VFR BLD CALC: 30.1 % — LOW (ref 34.5–45)
HCT VFR BLD CALC: 30.5 % — LOW (ref 34.5–45)
HGB BLD-MCNC: 9.6 G/DL — LOW (ref 11.5–15.5)
HGB BLD-MCNC: 9.9 G/DL — LOW (ref 11.5–15.5)
INR BLD: 1.24 RATIO — HIGH (ref 0.88–1.16)
MCHC RBC-ENTMCNC: 29.5 PG — SIGNIFICANT CHANGE UP (ref 27–34)
MCHC RBC-ENTMCNC: 31 PG — SIGNIFICANT CHANGE UP (ref 27–34)
MCHC RBC-ENTMCNC: 31.9 GM/DL — LOW (ref 32–36)
MCHC RBC-ENTMCNC: 32.5 GM/DL — SIGNIFICANT CHANGE UP (ref 32–36)
MCV RBC AUTO: 92.6 FL — SIGNIFICANT CHANGE UP (ref 80–100)
MCV RBC AUTO: 95.3 FL — SIGNIFICANT CHANGE UP (ref 80–100)
PLATELET # BLD AUTO: 429 K/UL — HIGH (ref 150–400)
PLATELET # BLD AUTO: 503 K/UL — HIGH (ref 150–400)
POTASSIUM SERPL-MCNC: 4.1 MMOL/L — SIGNIFICANT CHANGE UP (ref 3.5–5.3)
POTASSIUM SERPL-SCNC: 4.1 MMOL/L — SIGNIFICANT CHANGE UP (ref 3.5–5.3)
PROTHROM AB SERPL-ACNC: 13.4 SEC — HIGH (ref 9.8–12.7)
RBC # BLD: 3.21 M/UL — LOW (ref 3.8–5.2)
RBC # BLD: 3.25 M/UL — LOW (ref 3.8–5.2)
RBC # FLD: 15 % — HIGH (ref 10.3–14.5)
RBC # FLD: 16 % — HIGH (ref 10.3–14.5)
SODIUM SERPL-SCNC: 140 MMOL/L — SIGNIFICANT CHANGE UP (ref 135–145)
WBC # BLD: 16.56 K/UL — HIGH (ref 3.8–10.5)
WBC # BLD: 18.1 K/UL — HIGH (ref 3.8–10.5)
WBC # FLD AUTO: 16.56 K/UL — HIGH (ref 3.8–10.5)
WBC # FLD AUTO: 18.1 K/UL — HIGH (ref 3.8–10.5)

## 2017-09-02 PROCEDURE — 71010: CPT | Mod: 26

## 2017-09-02 RX ORDER — HEPARIN SODIUM 5000 [USP'U]/ML
1200 INJECTION INTRAVENOUS; SUBCUTANEOUS
Qty: 25000 | Refills: 0 | Status: DISCONTINUED | OUTPATIENT
Start: 2017-09-02 | End: 2017-09-03

## 2017-09-02 RX ORDER — HEPARIN SODIUM 5000 [USP'U]/ML
1100 INJECTION INTRAVENOUS; SUBCUTANEOUS
Qty: 25000 | Refills: 0 | Status: DISCONTINUED | OUTPATIENT
Start: 2017-09-02 | End: 2017-09-02

## 2017-09-02 RX ADMIN — Medication 81 MILLIGRAM(S): at 13:39

## 2017-09-02 RX ADMIN — MORPHINE SULFATE 0.5 MILLIGRAM(S): 50 CAPSULE, EXTENDED RELEASE ORAL at 10:15

## 2017-09-02 RX ADMIN — Medication 50 MILLIGRAM(S): at 18:21

## 2017-09-02 RX ADMIN — MEROPENEM 200 MILLIGRAM(S): 1 INJECTION INTRAVENOUS at 13:46

## 2017-09-02 RX ADMIN — Medication 50 MILLIGRAM(S): at 06:07

## 2017-09-02 RX ADMIN — MEROPENEM 200 MILLIGRAM(S): 1 INJECTION INTRAVENOUS at 23:49

## 2017-09-02 RX ADMIN — HEPARIN SODIUM 12 UNIT(S)/HR: 5000 INJECTION INTRAVENOUS; SUBCUTANEOUS at 17:03

## 2017-09-02 RX ADMIN — Medication 975 MILLIGRAM(S): at 13:57

## 2017-09-02 RX ADMIN — FAMOTIDINE 20 MILLIGRAM(S): 10 INJECTION INTRAVENOUS at 13:40

## 2017-09-02 RX ADMIN — OXYCODONE AND ACETAMINOPHEN 2 TABLET(S): 5; 325 TABLET ORAL at 21:26

## 2017-09-02 RX ADMIN — ATORVASTATIN CALCIUM 40 MILLIGRAM(S): 80 TABLET, FILM COATED ORAL at 23:49

## 2017-09-02 RX ADMIN — MEROPENEM 200 MILLIGRAM(S): 1 INJECTION INTRAVENOUS at 06:07

## 2017-09-02 RX ADMIN — MORPHINE SULFATE 0.5 MILLIGRAM(S): 50 CAPSULE, EXTENDED RELEASE ORAL at 09:18

## 2017-09-02 RX ADMIN — OXYCODONE AND ACETAMINOPHEN 2 TABLET(S): 5; 325 TABLET ORAL at 20:41

## 2017-09-02 NOTE — PROGRESS NOTE ADULT - SUBJECTIVE AND OBJECTIVE BOX
Subjective: Pt states" " Denies any CP, SOB, palpitations. No acute events overnight.    Vital Signs:  Vital Signs Last 24 Hrs  T(C): 36.9 (09-02-17 @ 13:48), Max: 36.9 (09-02-17 @ 13:48)  T(F): 98.4 (09-02-17 @ 13:48), Max: 98.4 (09-02-17 @ 13:48)  HR: 105 (09-02-17 @ 13:48) (77 - 105)  BP: 102/67 (09-02-17 @ 13:48) (102/67 - 148/85)  RR: 16 (09-02-17 @ 13:48) (16 - 18)  SpO2: 97% (09-02-17 @ 13:48) (94% - 97%) on (O2)        Relevant labs, radiology and Medications reviewed                        9.6    16.56 )-----------( 503      ( 02 Sep 2017 08:32 )             30.1     09-02    140  |  99  |  10  ----------------------------<  104<H>  4.1   |  30  |  0.39<L>    Ca    8.7      02 Sep 2017 08:23      PT/INR - ( 02 Sep 2017 06:13 )   PT: 13.4 sec;   INR: 1.24 ratio         PTT - ( 02 Sep 2017 06:13 )  PTT:128.6 sec  MEDICATIONS  (STANDING):  atorvastatin 40 milliGRAM(s) Oral at bedtime  aspirin  chewable 81 milliGRAM(s) Oral daily  meropenem IVPB   IV Intermittent   meropenem IVPB 1000 milliGRAM(s) IV Intermittent every 8 hours  famotidine    Tablet 20 milliGRAM(s) Oral daily  metoprolol 50 milliGRAM(s) Oral two times a day  diltiazem    Tablet 30 milliGRAM(s) Oral every 6 hours  heparin  Infusion 1100 Unit(s)/Hr (11 mL/Hr) IV Continuous <Continuous>    MEDICATIONS  (PRN):  oxyCODONE    5 mG/acetaminophen 325 mG 2 Tablet(s) Oral every 4 hours PRN Severe Pain (7 - 10)  oxyCODONE    5 mG/acetaminophen 325 mG 1 Tablet(s) Oral every 4 hours PRN Moderate Pain (4 - 6)  acetaminophen   Tablet 975 milliGRAM(s) Oral every 6 hours PRN mild pain  morphine  - Injectable 0.5 milliGRAM(s) IV Push every 6 hours PRN Moderate Pain (4 - 6)      I&O's Summary    01 Sep 2017 07:01  -  02 Sep 2017 07:00  --------------------------------------------------------  IN: 1396 mL / OUT: 2600 mL / NET: -1204 mL    02 Sep 2017 07:01  -  02 Sep 2017 15:31  --------------------------------------------------------  IN: 480 mL / OUT: 0 mL / NET: 480 mL        IMAGING    CXR:    CT Chest:    PAST MEDICAL & SURGICAL HISTORY:  Displaced spiral fracture of shaft of humerus, left arm, subsequent encounter for fracture with routine healing  Insomnia  GERD (gastroesophageal reflux disease)  HLD (hyperlipidemia)  HTN (hypertension)       Physical Exam:  Neurology: A&Ox3, nonfocal, GARCIA x 4, NAD  Respiratory: B/L BS CTA, diminished at bases, No wheezing, rales, rhonchi  CV: RRR, S1S2

## 2017-09-02 NOTE — PROGRESS NOTE ADULT - PROBLEM SELECTOR PLAN 1
will remove chest tube today pending cxr  anticipate Unasyn IV via PICC x 3 wks post VATS as per ID. Pending PICC placement.  daily chest xray  analgesics, maintain LUE to shoulder sling

## 2017-09-02 NOTE — PROGRESS NOTE ADULT - ASSESSMENT
72 year old female with htn, fracture of Left arm, GERD, HLD admitted for new onset  Afib with RVR/ PNA/ s/p thoracotomy for empyema         1. Cv stable post op  remains in AF  rate controlled  continue cardizem 30 mg and metoprolol 50mg BID  cont a/c hep gtt for af   continue with ASA   coumadin vs noac once ok by thoracic standpoint    2. Empyema  IV abx per id  CT management per thoracic   thoracic/pulmo f/u

## 2017-09-02 NOTE — PROGRESS NOTE ADULT - SUBJECTIVE AND OBJECTIVE BOX
[facility] - [facility]     PROGRESS NOTE    CHIEF COMPLAINT: empyema    INTERVAL HISTORY: continues to improve; right sided chest wall pain better controlled with IVP morphine; out of bed to chair; walked in the hallway yesterday; at baseline mental status; abdominal discomfort resolved with placement of maxwell catheter for urinary retention; tele with AF with controlled rate; hemodynamically stable; second chest tube removed; no fevers, chills or sweats; no dyspnea on room air, chest congestion or wheeze;     REVIEW OF SYSTEMS:  Constitutional: As per interval history  HEENT: Within normal limits  CV: As per interval history  Resp: As per interval history  GI: Within normal limits   : Within normal limits  Musculoskeletal: Within normal limits  Skin: Within normal limits  Neurological: Within normal limits  Psychiatric: Within normal limits  Endocrine: Within normal limits  Hematologic/Lymphatic: Within normal limits  Allergic/Immunologic: Within normal limits      MEDICATIONS:     Pulmonary "      Anti-microbials:  meropenem IVPB   IV Intermittent   meropenem IVPB 1000 milliGRAM(s) IV Intermittent every 8 hours      Cardiovascular:  metoprolol 50 milliGRAM(s) Oral two times a day  diltiazem    Tablet 30 milliGRAM(s) Oral every 6 hours      Other:  atorvastatin 40 milliGRAM(s) Oral at bedtime  aspirin  chewable 81 milliGRAM(s) Oral daily  oxyCODONE    5 mG/acetaminophen 325 mG 2 Tablet(s) Oral every 4 hours PRN  oxyCODONE    5 mG/acetaminophen 325 mG 1 Tablet(s) Oral every 4 hours PRN  famotidine    Tablet 20 milliGRAM(s) Oral daily  acetaminophen   Tablet 975 milliGRAM(s) Oral every 6 hours PRN  morphine  - Injectable 0.5 milliGRAM(s) IV Push every 6 hours PRN  heparin  Infusion 1100 Unit(s)/Hr IV Continuous <Continuous>        OBJECTIVE:        I&O's Detail    01 Sep 2017 07:01  -  02 Sep 2017 07:00  --------------------------------------------------------  IN:    heparin Infusion: 156 mL    Oral Fluid: 1040 mL    Solution: 200 mL  Total IN: 1396 mL    OUT:    Indwelling Catheter - Urethral: 2600 mL  Total OUT: 2600 mL    Total NET: -1204 mL    Daily Weight in k (02 Sep 2017 07:56)      PHYSICAL EXAM:       ICU Vital Signs Last 24 Hrs  T(C): 36.6 (02 Sep 2017 04:54), Max: 36.7 (01 Sep 2017 13:57)  T(F): 97.9 (02 Sep 2017 04:54), Max: 98.1 (01 Sep 2017 13:57)  HR: 90 (02 Sep 2017 04:54) (77 - 90)  BP: 148/85 (02 Sep 2017 04:54) (105/55 - 157/83)  BP(mean): --  ABP: --  ABP(mean): --  RR: 18 (02 Sep 2017 04:54) (18 - 18)  SpO2: 97% (02 Sep 2017 04:54) (94% - 98%) on room air     General: Awake and alert. Comfortable in bed. Appears stated age 	  HEENT:   AT/NC/Anicteric. PERRL/EOMI. Normal oral mucosa,  Neck: Supple. Trachea midline. Thyroid without enlargement/tenderness/nodules. No carotid bruit. No JVD	  Cardiovascular: Irregularly irregular rate and rhythm. S1 S2 normal. II/VI systolic murmur  Respiratory: Respirations unlabored. Improved breath sounds on the right and decreased rales - 1 chest tube in place; no air leak appreciated  Abdomen: Soft. Non-tender. Non-distended. No organomegaly. No masses. Normal BS. Maxwell cathter  Extremities: Warm to touch. Mild lower extremity and LUE edema; LUE in hard brace  Pulses: 2+ peripheral pulses all extremities	  Skin: Thoracotomy incision bandaged - C/D/I  Lymph Nodes: Cervical, supraclavicular and axillary nodes normal  Neurological: Motor and sensory examination equal and normal. A and O x 3  Psychiatry: Appropriate mood and affect.      LABS:                        9.6    16.56 )-----------( 503      ( 02 Sep 2017 08:32 )             30.1                         10.2   14.0  )-----------( 491      ( 01 Sep 2017 06:16 )             31.5     09-02    140  |  99  |  10  ----------------------------<  104<H>  4.1   |  30  |  0.39<L>        142  |  99  |  10  ----------------------------<  102<H>  3.2<L>   |  33<H>  |  0.35<L>    Ca      8.7          Ca      8.6          Phos    1.9         Phos    2.3     30      Mg       1.7         Mg       1.7     08-30      PT/INR - ( 02 Sep 2017 06:13 )   PT: 13.4 sec;   INR: 1.24 ratio         PTT - ( 02 Sep 2017 06:13 )  PTT:128.6 sec      MICROBIOLOGY:     Urinalysis Basic - ( 29 Aug 2017 14:08 )    Color: Yellow / Appearance: Clear / S.009 / pH: x  Gluc: x / Ketone: Negative  / Bili: Negative / Urobili: 4   Blood: x / Protein: 30 mg/dL / Nitrite: Negative   Leuk Esterase: Negative / RBC: >50 /HPF / WBC 25-50 /HPF   Sq Epi: x / Non Sq Epi: x / Bacteria: x    Culture - Blood (17 @ 05:49)    Specimen Source: .Blood Blood-Peripheral    Culture Results:   No growth to date.    Culture - Blood (17 @ 02:31)    Specimen Source: .Blood Blood-Peripheral    Culture Results:   No growth to date.    Culture - Body Fluid with Gram Stain (17 @ 00:50)    Gram Stain:   polymorphonuclear leukocytes seen  Gram Negative Rods seen    Specimen Source: .Body Fluid Pleural Fluid    Culture Results:   Numerous Fusobacterium nucleatum    Culture - Body Fluid with Gram Stain (17 @ 13:05)    Gram Stain:   Rare polymorphonuclear leukocytes per low power field  No organisms seen    Specimen Source: .Body Fluid Pleural Fluid    Culture Results:   Moderate Fusobacterium nucleatum    RADIOLOGY:  [x ] Chest radiographs reviewed and interpreted by me    < from: Xray Chest 1 View AP- PORTABLE-Urgent (17 @ 15:45) >    EXAM:  CHEST-PORTABLE URGENT                            PROCEDURE DATE:  2017      INTERPRETATION:    DATE OF STUDY: 17 at 3:44PM.    PRIOR: Earlier 17 chest.    CLINICAL INDICATION: S/P removal of one right chest tube; assess   pneumothorax    TECHNIQUE: portable chest.    FINDINGS:   One of the two right chest tubes has been removed.  Right arm PICC line tip overlies SVC.  The cardiac silhouette is stable in appearance.   Minimal right apical pneumothorax persists.  Persistent mild right pleural effusion.  Persistent mid and lower right lung opacities.  The left lung is clear.    IMPRESSION:   S/P removal of one of the two right chest tubes.  Persistent minimal right apical pneumothorax.  Persistent mid and lower rightlung opacities and mild right pleural   effusion.  Left lung is clear      NOHELIA PINON M.D., ATTENDING RADIOLOGIST  This document has been electronically signed. Sep  2 2017  9:19AM      < end of copied text >    ---------------------------------------------------------------------------------------------------------    < from: Xray Chest 1 View AP -PORTABLE-Routine (17 @ 09:07) >    EXAM:  CHEST PORTABLE ROUTINE                          INTERPRETATION:  CLINICAL INFORMATION: Chest tube on waterseal.    EXAM: AP portable chest from 2017 at 9:09 AM and 2017 at 5:22   PM.    COMPARISON: Chest radiograph from 2017 at 11:16 AM    FINDINGS:  Chest radiograph 2017 at 5:22 AM:  Paired right-sided chest tubes, unchanged in position.  Small right-sided pneumothorax.  Small right and trace left pleural effusions.  The cardiomediastinal silhouette is unchanged.  Degenerative changes of the spine.    Chest radiograph 2017 at 9:09 AM:  Stable right-sided pneumothorax with paired right-sided chest tubes.  The remainder of the examination is also unchanged.    IMPRESSION:   Right-sided chest tubes.  Small right-sided pneumothorax.  Small right and trace left pleural effusions.    ALYSSA SULLIVAN M.D., RADIOLOGY RESIDENT  This document has been electronically signed.  HARIS VELÁSQUEZ M.D., ATTENDING RADIOLOGIST  This document has been electronically signed. Aug 31 2017 12:20PM      < end of copied text >    ---------------------------------------------------------------------------------------------------------    < from: Xray Chest 1 View AP- PORTABLE-Urgent (17 @ 11:14) >    EXAM:  CHEST-PORTABLE URGENT                            PROCEDURE DATE:  2017      INTERPRETATION:  CLINICAL INFORMATION: Rule out pneumothorax status post   right VATS decortication.    EXAM: Single frontal view chest radiograph.    COMPARISON:  Chest radiograph 2017    FINDINGS:   The cardiomediastinal silhouette cannot be accurately evaluated on this   projection.    There is a right-sided chest tube and right pleural effusion improved as   compared to the prior study.  There is a left-sided pleural effusion unchanged from the prior.     There is a small right-sided apical pneumothorax.    There are multilevel degenerative changes of the spine.    IMPRESSION:   Small right pneumothorax. Right-sided chest tube in place.    Bilateral pleural effusions right larger than left. Marginal interval   improvement in the right-sided pleural effusion.      SERGIO HOUGH M.D., RADIOLOGY RESIDENT  This document has been electronically signed.  WHIT SOLER M.D., ATTENDING RADIOLOGIST  This document has been electronically signed. Aug 30 2017  2:02PM      < end of copied text >  ---------------------------------------------------------------------------------------------------------      < from: Xray Chest 1 View AP- PORTABLE-Urgent (17 @ 14:14) >    EXAM:  CHEST-PORTABLE URGENT                            PROCEDURE DATE:  2017      INTERPRETATION:  CLINICAL INFORMATION: Status post chest tube removal.    EXAM: AP portable chest from 2017 at 2:16 PM.    COMPARISON: Chest radiograph from 2017 at 629    FINDINGS:  Paired right-sided chest tubes, unchanged in position.  Small bilateral pleural effusions.  No pneumothorax.  The cardiomediastinal silhouette is unchanged.  Redemonstration of a displaced, age indeterminant left humeral fracture.    IMPRESSION:   Right-sided chest tubes.  Bilateral pleural effusions, larger on right.  No pneumothorax.    ALYSSA SULLIVAN M.D., RADIOLOGY RESIDENT  This document has been electronically signed.  HARIS VELÁSQUEZ M.D., ATTENDING RADIOLOGIST  This document has been electronically signed. Aug 29 2017  3:43PM      < end of copied text >  ---------------------------------------------------------------------------------------------------------  ---------------------------------------------------------------------------------------------------------      --------------------------------------------------------------------------------------------------------  < from: Xray Chest 1 View AP -PORTABLE-Routine (17 @ 22:15) >    EXAM:  CHEST PORTABLE ROUTINE                            PROCEDURE DATE:  2017      INTERPRETATION:    CLINICAL INDICATION: Status post surgery    TECHNIQUE: Portable frontal view of the chest.    COMPARISON: Frontal chest radiograph from 2017 at 6:27 AM.    FINDINGS:   There is a right-sided IJ central venous catheter with distal tip   terminating in the region of the SVC. There are 2 right apical chest   tubes and a right basilar chest tube in place.  Cardiac size is within normal limits.  There is worsening right basilar opacity likely representing right lower   lobe pneumonia and/or atelectasis.  Small bilateral pleural effusions. There is redemonstration of a small   right-sided pneumothorax.  There is redemonstration of a displaced, angulated midshaft humeral   fracture      IMPRESSION:   1. Worsening right lower lobe pneumonia and/or atelectasis.   2. Small bilateral pleural effusions   3. Unchanged small right apical pneumothorax.    JOSHUA RICHTER M.D., RADIOLOGY RESIDENT  This document has been electronically signed.  LUCA FITZGERALD M.D.; ATTENDING RADIOLOGIST  This document has been electronically signed. Aug 28 2017 12:03PM      < end of copied text >  ---------------------------------------------------------------------------------------------------------  < from: CT Angio Chest w/ IV Cont (17 @ 15:24) >    EXAM:  CT ANGIO CHEST (W)AW IC                            PROCEDURE DATE:  2017        INTERPRETATION:  CLINICAL INFORMATION: Hypoxia and rapid atrial   fibrillation. Pneumonia on x-ray dated 2017.    PROCEDURE:   CT Pulmonary Angiogram was performed with intravenous contrast.     Intravenous contrast: 90 ml Omnipaque 350. 10 ml discarded.    Reconstructions were performed in axial thin, axial maximum intensity   projection, sagittal and coronal planes.    COMPARISON: Chest x-raydated 2017 and 2017. MRI abdomen   2009.    FINDINGS:    LUNGS AND LARGE AIRWAYS: Patent central airways. Compressive atelectasis   of the bilateral lower lobes, greater on the right, and the right middle   lobe. Foci of peripherally oriented groundglass opacities in the left   upper lobe. Small amount of paraseptal emphysema is seen in the left   upper lobe.  PLEURA: Large multiloculated right pleural effusion with thickened and   mildly hyperdense peripheral wall, indicative of pleural thickening.   Small layering left pleural effusion.  VESSELS: No evidence of pulmonary embolism. Atherosclerotic   calcifications of the thoracic aorta and coronary arteries.  HEART: There is mild dilatation of the left atrium. No pericardial   effusion.  MEDIASTINUM AND JEAN PIERRE: No lymphadenopathy.  CHEST WALL AND LOWER NECK: Within normal limits.  UPPER ABDOMEN: Partially visualized peripherally calcified right renal   cyst, as seen on prior MRI abdomen dated 2009, with increased   calcifications from prior study. Unchanged hepatic calcification. Status   post cholecystectomy.  BONES: Degenerative spondylosis of the spine. Partially visualized   comminuted spiral fracture of the left humeral mid to distal shaft.    IMPRESSION:    No evidence of pulmonary embolism.    Large multiloculated right pleural effusion with pleural thickening,   concerning for possible infectious or neoplastic etiology. Compressive   partial atelectasis of the right lower and middle lobes.    Small left pleural effusion with associated compressive atelectasis of   the left lower lobe.    Patchy groundglass opacity in the left upper lobes which may be   infectious or related to pulmonary edema given the presence of pleural   effusion.    Partially visualized acute comminuted spiral fracture of the left humeral   mid to distal shaft.     SAVAGE CID M.D., RADIOLOGY RESIDENT  This document has been electronically signed.  JEANETH ROBERTS M.D. ATTENDING RADIOLOGIST  This document has beenelectronically signed. Aug 23 2017  4:29PM

## 2017-09-02 NOTE — PROGRESS NOTE ADULT - SUBJECTIVE AND OBJECTIVE BOX
Patient is a 72y old  Female who presents with a chief complaint of Afib (22 Aug 2017 20:06)      SUBJECTIVE / OVERNIGHT EVENTS: c/o pain at chest tube site.  picc line was placed.    MEDICATIONS  (STANDING):  atorvastatin 40 milliGRAM(s) Oral at bedtime  aspirin  chewable 81 milliGRAM(s) Oral daily  meropenem IVPB   IV Intermittent   meropenem IVPB 1000 milliGRAM(s) IV Intermittent every 8 hours  famotidine    Tablet 20 milliGRAM(s) Oral daily  metoprolol 50 milliGRAM(s) Oral two times a day  diltiazem    Tablet 30 milliGRAM(s) Oral every 6 hours  heparin  Infusion 1100 Unit(s)/Hr (11 mL/Hr) IV Continuous <Continuous>    MEDICATIONS  (PRN):  oxyCODONE    5 mG/acetaminophen 325 mG 2 Tablet(s) Oral every 4 hours PRN Severe Pain (7 - 10)  oxyCODONE    5 mG/acetaminophen 325 mG 1 Tablet(s) Oral every 4 hours PRN Moderate Pain (4 - 6)  acetaminophen   Tablet 975 milliGRAM(s) Oral every 6 hours PRN mild pain  morphine  - Injectable 0.5 milliGRAM(s) IV Push every 6 hours PRN Moderate Pain (4 - 6)      Vital Signs Last 24 Hrs  T(C): 36.6 (02 Sep 2017 04:54), Max: 36.7 (01 Sep 2017 13:57)  T(F): 97.9 (02 Sep 2017 04:54), Max: 98.1 (01 Sep 2017 13:57)  HR: 90 (02 Sep 2017 04:54) (77 - 90)  BP: 148/85 (02 Sep 2017 04:54) (105/55 - 157/83)  BP(mean): --  RR: 18 (02 Sep 2017 04:54) (18 - 18)  SpO2: 97% (02 Sep 2017 04:54) (94% - 98%)  CAPILLARY BLOOD GLUCOSE        I&O's Summary    01 Sep 2017 07:01  -  02 Sep 2017 07:00  --------------------------------------------------------  IN: 1396 mL / OUT: 2600 mL / NET: -1204 mL        PHYSICAL EXAM:  GENERAL: NAD, well-developed  HEAD:  Atraumatic, Normocephalic  EYES: EOMI, PERRLA, conjunctiva and sclera clear  NECK: Supple, No JVD  CHEST/LUNG: Clear to auscultation bilaterally; No wheeze  HEART: Regular rate and rhythm; No murmurs, rubs, or gallops  ABDOMEN: Soft, Nontender, Nondistended; Bowel sounds present  EXTREMITIES:  2+ Peripheral Pulses, No clubbing, cyanosis, or edema  PSYCH: AAOx3  NEUROLOGY: non-focal  SKIN: No rashes or lesions    LABS:                        9.6    16.56 )-----------( 503      ( 02 Sep 2017 08:32 )             30.1     09-02    140  |  99  |  10  ----------------------------<  104<H>  4.1   |  30  |  0.39<L>    Ca    8.7      02 Sep 2017 08:23  Phos  1.9     08-31  Mg     1.7     08-31      PT/INR - ( 02 Sep 2017 06:13 )   PT: 13.4 sec;   INR: 1.24 ratio         PTT - ( 02 Sep 2017 06:13 )  PTT:128.6 sec          RADIOLOGY & ADDITIONAL TESTS:    Imaging Personally Reviewed:    Consultant(s) Notes Reviewed:      Care Discussed with Consultants/Other Providers:

## 2017-09-02 NOTE — PROGRESS NOTE ADULT - ASSESSMENT
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 - procedure had to be converted to formal thoracotomy due to dense adhesions and subpulmonic location of the process - seems to be improving now with control of infection    post-op confusion (resolved) - perhaps related to pain secondary to urinary retention or meds    recurrent atrial fibrillation with RVR now with rate control on po cardizem and oral beta blockers    left humeral fracture    PLAN/RECOMMENDATIONS    stable oxygenation on room air  on meropenem - will need a three week course from the time of surgery - s/p PICC line placement - to complete antibiotic course with home invanz once daily  orthopedic follow-up - humeral fracture repair to be delayed until antibiotics completed  pain control (IVP morphine required today)/incentive spirometry -   chest tube to water seal - no air leak - hopefully last chest tube can be removed today - CT surgery f/u  AF rate controlled  - cardizem po/oral beta blockers/restarted on full dose heparin gtt - watch for post-operative bleeding - GI prophylaxis on A/C -   trial of void      Will follow with you; d/w CTS PA    Truman Arellano MD, [facility] - [phone]  Pulmonary Medicine

## 2017-09-02 NOTE — PROGRESS NOTE ADULT - SUBJECTIVE AND OBJECTIVE BOX
CC: Pt feeling better, dec R sided chest pain    TELEMETRY: AF 70-90    PHYSICAL EXAM:    T(C): 36.6 (09-02-17 @ 04:54), Max: 36.7 (09-01-17 @ 13:57)  HR: 90 (09-02-17 @ 04:54) (77 - 90)  BP: 148/85 (09-02-17 @ 04:54) (105/55 - 157/83)  RR: 18 (09-02-17 @ 04:54) (18 - 18)  SpO2: 97% (09-02-17 @ 04:54) (94% - 98%)  Wt(kg): --  I&O's Summary    01 Sep 2017 07:01  -  02 Sep 2017 07:00  --------------------------------------------------------  IN: 1396 mL / OUT: 2600 mL / NET: -1204 mL        Appearance: Normal	  Cardiovascular: S1S2 irreg  Respiratory: dec BS at the bases	  Gastrointestinal:  Soft, Non-tender, + BS	  Extremities: Normal range of motion, No clubbing, cyanosis or edema  Vascular: Peripheral pulses palpable 2+ bilaterally     LABS:	 	                          9.6    16.56 )-----------( 503      ( 02 Sep 2017 08:32 )             30.1     09-02    140  |  99  |  10  ----------------------------<  104<H>  4.1   |  30  |  0.39<L>    Ca    8.7      02 Sep 2017 08:23  Phos  1.9     08-31  Mg     1.7     08-31        PT/INR - ( 02 Sep 2017 06:13 )   PT: 13.4 sec;   INR: 1.24 ratio         PTT - ( 02 Sep 2017 06:13 )  PTT:128.6 sec    CARDIAC MARKERS:

## 2017-09-02 NOTE — PROGRESS NOTE ADULT - ASSESSMENT
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  maintain right chest tubes to water seal   anticipate Unasyn IV via PICC x 3 wks post VATS as per ID.   daily chest xray    leucocytosis- id eval noted, picc line  placed cw abx 3 weeks post vats  Cv stable post op  af rate control Cardizem po and metoprolol  cont a/c for af   IV abx per id  chest tube mgt per ct surg  left ue ext fx- ortho consult.  x ray done  constipation-  Miralax daily  DVT ppx   pt eval  dc planning to [facility].

## 2017-09-03 LAB
APPEARANCE UR: CLEAR — SIGNIFICANT CHANGE UP
APTT BLD: 39.9 SEC — HIGH (ref 27.5–37.4)
APTT BLD: 76.5 SEC — HIGH (ref 27.5–37.4)
APTT BLD: 84.2 SEC — HIGH (ref 27.5–37.4)
APTT BLD: > 200 SEC (ref 27.5–37.4)
BACTERIA # UR AUTO: NEGATIVE — SIGNIFICANT CHANGE UP
BILIRUB UR-MCNC: NEGATIVE — SIGNIFICANT CHANGE UP
COLOR SPEC: YELLOW — SIGNIFICANT CHANGE UP
CULTURE RESULTS: SIGNIFICANT CHANGE UP
CULTURE RESULTS: SIGNIFICANT CHANGE UP
DIFF PNL FLD: NEGATIVE — SIGNIFICANT CHANGE UP
EPI CELLS # UR: 0 /HPF — SIGNIFICANT CHANGE UP (ref 0–5)
GLUCOSE UR QL: NEGATIVE MG/DL — SIGNIFICANT CHANGE UP
HCT VFR BLD CALC: 33.8 % — LOW (ref 34.5–45)
HGB BLD-MCNC: 10.9 G/DL — LOW (ref 11.5–15.5)
HYALINE CASTS # UR AUTO: 1 /LPF — SIGNIFICANT CHANGE UP (ref 0–7)
KETONES UR-MCNC: NEGATIVE — SIGNIFICANT CHANGE UP
LEUKOCYTE ESTERASE UR-ACNC: NEGATIVE — SIGNIFICANT CHANGE UP
MCHC RBC-ENTMCNC: 31.1 PG — SIGNIFICANT CHANGE UP (ref 27–34)
MCHC RBC-ENTMCNC: 32.3 GM/DL — SIGNIFICANT CHANGE UP (ref 32–36)
MCV RBC AUTO: 96.3 FL — SIGNIFICANT CHANGE UP (ref 80–100)
NITRITE UR-MCNC: NEGATIVE — SIGNIFICANT CHANGE UP
PH UR: 7.5 — SIGNIFICANT CHANGE UP (ref 5–8)
PLATELET # BLD AUTO: 556 K/UL — HIGH (ref 150–400)
PROT UR-MCNC: ABNORMAL MG/DL
RBC # BLD: 3.51 M/UL — LOW (ref 3.8–5.2)
RBC # FLD: 15.2 % — HIGH (ref 10.3–14.5)
RBC CASTS # UR COMP ASSIST: 4 /HPF — SIGNIFICANT CHANGE UP (ref 0–4)
SP GR SPEC: 1.01 — SIGNIFICANT CHANGE UP (ref 1.01–1.02)
SPECIMEN SOURCE: SIGNIFICANT CHANGE UP
SPECIMEN SOURCE: SIGNIFICANT CHANGE UP
UROBILINOGEN FLD QL: 1 MG/DL — SIGNIFICANT CHANGE UP
WBC # BLD: 18.6 K/UL — HIGH (ref 3.8–10.5)
WBC # FLD AUTO: 18.6 K/UL — HIGH (ref 3.8–10.5)
WBC UR QL: 1 /HPF — SIGNIFICANT CHANGE UP (ref 0–5)

## 2017-09-03 RX ORDER — HEPARIN SODIUM 5000 [USP'U]/ML
1100 INJECTION INTRAVENOUS; SUBCUTANEOUS
Qty: 25000 | Refills: 0 | Status: DISCONTINUED | OUTPATIENT
Start: 2017-09-03 | End: 2017-09-03

## 2017-09-03 RX ORDER — HEPARIN SODIUM 5000 [USP'U]/ML
1200 INJECTION INTRAVENOUS; SUBCUTANEOUS
Qty: 25000 | Refills: 0 | Status: DISCONTINUED | OUTPATIENT
Start: 2017-09-03 | End: 2017-09-03

## 2017-09-03 RX ORDER — HEPARIN SODIUM 5000 [USP'U]/ML
12 INJECTION INTRAVENOUS; SUBCUTANEOUS
Qty: 25000 | Refills: 0 | Status: DISCONTINUED | OUTPATIENT
Start: 2017-09-03 | End: 2017-09-03

## 2017-09-03 RX ORDER — HEPARIN SODIUM 5000 [USP'U]/ML
1100 INJECTION INTRAVENOUS; SUBCUTANEOUS
Qty: 25000 | Refills: 0 | Status: DISCONTINUED | OUTPATIENT
Start: 2017-09-03 | End: 2017-09-04

## 2017-09-03 RX ADMIN — MEROPENEM 200 MILLIGRAM(S): 1 INJECTION INTRAVENOUS at 14:30

## 2017-09-03 RX ADMIN — MEROPENEM 200 MILLIGRAM(S): 1 INJECTION INTRAVENOUS at 22:53

## 2017-09-03 RX ADMIN — MEROPENEM 200 MILLIGRAM(S): 1 INJECTION INTRAVENOUS at 06:04

## 2017-09-03 RX ADMIN — Medication 81 MILLIGRAM(S): at 11:02

## 2017-09-03 RX ADMIN — ATORVASTATIN CALCIUM 40 MILLIGRAM(S): 80 TABLET, FILM COATED ORAL at 22:52

## 2017-09-03 RX ADMIN — Medication 50 MILLIGRAM(S): at 06:04

## 2017-09-03 RX ADMIN — FAMOTIDINE 20 MILLIGRAM(S): 10 INJECTION INTRAVENOUS at 11:02

## 2017-09-03 RX ADMIN — HEPARIN SODIUM 11 UNIT(S)/HR: 5000 INJECTION INTRAVENOUS; SUBCUTANEOUS at 02:34

## 2017-09-03 RX ADMIN — HEPARIN SODIUM 12 UNIT(S)/HR: 5000 INJECTION INTRAVENOUS; SUBCUTANEOUS at 11:51

## 2017-09-03 RX ADMIN — Medication 50 MILLIGRAM(S): at 22:52

## 2017-09-03 RX ADMIN — HEPARIN SODIUM 11 UNIT(S)/HR: 5000 INJECTION INTRAVENOUS; SUBCUTANEOUS at 20:15

## 2017-09-03 NOTE — DISCHARGE NOTE ADULT - CARE PROVIDER_API CALL
Shavonne Cummins), Surgery  [address] [address] [place] [address]  Phone: [phone]  Fax: [phone] Shavonne Cummins), Surgery  [address] [address] [place] [address]  Phone: [phone]  Fax: [phone]    John Corral), Orthopaedic Surgery  [address] [place] [address]  Phone: [phone]  Fax: [phone]    Aj Brumfield), Critical Care Medicine; Internal Medicine; Pulmonary Disease  [address] [facility]  [address]  [place] [address]  Phone: [phone]  Fax: [phone]

## 2017-09-03 NOTE — PROVIDER CONTACT NOTE (OTHER) - SITUATION
.6
Hematuria first episode, Heparin drip at 11 mL/hr
Pt -160 AFIB
Pt continue to have runs rapid AFIB 160-170
c/o upper back pain right side, pt with 2 right side chest tube.
pt u/o 30 cc at a time with frequency
Pt admitted to hospital for pre surgical testing to fix the left shoulder. Pt found to be in rapid afib. Diagnosed as new onset afib.

## 2017-09-03 NOTE — PROGRESS NOTE ADULT - PROBLEM SELECTOR PROBLEM 1
Atrial fibrillation with RVR
Empyema, right
Pleural effusion
Empyema, right
Pleural effusion

## 2017-09-03 NOTE — PROVIDER CONTACT NOTE (OTHER) - BACKGROUND
8/22 s/p Left humerus fx   8/23emphyema  right lumg gram+ cocci  8/24 thoracentesis  8/25 s/p throcotomy Right VATs
8/23 s/p thoracentesis  8/24` s/p Thoracotomy Right VATS
Admitted for Afib, SOB, septic. S/P thoracotomy 8/25.
hx of same issue requiiring maxwell that was d/c'd 9/2/17
right side pneumo
Pt has PMH of GERD, hyperlipemia, mechanical fall ( broken left shoulder)

## 2017-09-03 NOTE — PROVIDER CONTACT NOTE (OTHER) - ASSESSMENT
no signs/symptoms bleeding
Pt is alert and oriented x4. Denies chest pain. No signs or symptoms of bleeding. No active respiratory distress.
Positive for hematuria. No other bleeding noted.
Pt asymptomatic denies chest pain, palpitations and or SOB.  /82,  pox 96 on 2L nc
Pt asymptomatic denies chest pain, palpitations and or SOB./62- 1163/90  hr 114-168.  Lopressor 5mg ivpx2.  Cardizem 10mg ivpx1.  Heparin gtt @ 11ml/hr infusing.
bp 130/80 c/o pain with movement localized non radiating
scanned >950ml, pubic area hard with pain to touch

## 2017-09-03 NOTE — PROGRESS NOTE ADULT - SUBJECTIVE AND OBJECTIVE BOX
Subjective: Pt states" " Denies any CP, SOB, palpitations. No acute events overnight.    Vital Signs:  Vital Signs Last 24 Hrs  T(C): 36.5 (09-03-17 @ 06:07), Max: 36.9 (09-02-17 @ 13:48)  T(F): 97.7 (09-03-17 @ 06:07), Max: 98.4 (09-02-17 @ 13:48)  HR: 86 (09-03-17 @ 06:07) (84 - 105)  BP: 144/81 (09-03-17 @ 06:07) (102/67 - 174/79)  RR: 18 (09-03-17 @ 06:07) (16 - 18)  SpO2: 95% (09-03-17 @ 06:07) (95% - 97%) on (O2)        Relevant labs, radiology and Medications reviewed                        10.9   18.6  )-----------( 556      ( 03 Sep 2017 07:25 )             33.8     09-02    140  |  99  |  10  ----------------------------<  104<H>  4.1   |  30  |  0.39<L>    Ca    8.7      02 Sep 2017 08:23      PT/INR - ( 02 Sep 2017 06:13 )   PT: 13.4 sec;   INR: 1.24 ratio         PTT - ( 03 Sep 2017 07:25 )  PTT:> 200 sec  MEDICATIONS  (STANDING):  atorvastatin 40 milliGRAM(s) Oral at bedtime  aspirin  chewable 81 milliGRAM(s) Oral daily  meropenem IVPB   IV Intermittent   meropenem IVPB 1000 milliGRAM(s) IV Intermittent every 8 hours  famotidine    Tablet 20 milliGRAM(s) Oral daily  metoprolol 50 milliGRAM(s) Oral two times a day  diltiazem    Tablet 30 milliGRAM(s) Oral every 6 hours  heparin  Infusion 1100 Unit(s)/Hr (11 mL/Hr) IV Continuous <Continuous>    MEDICATIONS  (PRN):  acetaminophen   Tablet 975 milliGRAM(s) Oral every 6 hours PRN mild pain  morphine  - Injectable 0.5 milliGRAM(s) IV Push every 6 hours PRN Moderate Pain (4 - 6)      I&O's Summary    02 Sep 2017 07:01  -  03 Sep 2017 07:00  --------------------------------------------------------  IN: 1192 mL / OUT: 620 mL / NET: 572 mL        IMAGING    CXR: reviewed. Unchanged right small apical ptx s/p chest tube removal    CT Chest:    PAST MEDICAL & SURGICAL HISTORY:  Displaced spiral fracture of shaft of humerus, left arm, subsequent encounter for fracture with routine healing  Insomnia  GERD (gastroesophageal reflux disease)  HLD (hyperlipidemia)  HTN (hypertension)       Physical Exam:  Neurology: A&Ox3, nonfocal, GARCIA x 4, NAD  Respiratory: B/L BS CTA, diminished at bases, No wheezing, rales, rhonchi  CV: RRR, S1S2

## 2017-09-03 NOTE — PROGRESS NOTE ADULT - SUBJECTIVE AND OBJECTIVE BOX
CC: NO CP/SOB    TELEMETRY:     PHYSICAL EXAM:    T(C): 36.5 (09-03-17 @ 06:07), Max: 36.9 (09-02-17 @ 13:48)  HR: 86 (09-03-17 @ 06:07) (84 - 105)  BP: 144/81 (09-03-17 @ 06:07) (102/67 - 174/79)  RR: 18 (09-03-17 @ 06:07) (16 - 18)  SpO2: 95% (09-03-17 @ 06:07) (95% - 97%)  Wt(kg): --  I&O's Summary    02 Sep 2017 07:01  -  03 Sep 2017 07:00  --------------------------------------------------------  IN: 1192 mL / OUT: 620 mL / NET: 572 mL        Appearance: Normal	  Cardiovascular: Normal S1 S2,RRR, No JVD, No murmurs  Respiratory: dec BS at the bases	  Gastrointestinal:  Soft, Non-tender, + BS	  Extremities: Normal range of motion, No clubbing, cyanosis or edema  Vascular: Peripheral pulses palpable 2+ bilaterally     LABS:	 	                          10.9   18.6  )-----------( 556      ( 03 Sep 2017 07:25 )             33.8     09-02    140  |  99  |  10  ----------------------------<  104<H>  4.1   |  30  |  0.39<L>    Ca    8.7      02 Sep 2017 08:23        PT/INR - ( 02 Sep 2017 06:13 )   PT: 13.4 sec;   INR: 1.24 ratio         PTT - ( 03 Sep 2017 09:47 )  PTT:39.9 sec    CARDIAC MARKERS:

## 2017-09-03 NOTE — DISCHARGE NOTE ADULT - HOSPITAL COURSE
per attending hx of HTN, HLD, gerd, insomnia s/p mechanical fall in 5/2017 with left humeral fracture sent from preop testing for Afib withRVR. Pt scheduled to undergo ORIF of left upper extremity tomorrow, now admitted for workup of Afib/rvr/sepsis. Denies chest pain, palpitations, shortness of breath, abdominal pain, fevers, chills, cough, dysuria, hematuria, melena, hematochezia. No headaches, dizziness, LOC. No sick contacts or travel history. Chronic smoking history over 50 pack years, quit in May 2017, never had CT scan. Reports decreased appetite and weight loss of 5-10lbs in the past month. Lives at home with family, ambulates without cane/ walker, ambulation has been limited since then     Dx: Afib with RVR  Pleural effusion: s/p Thoracentesis 8/24  L arm fracture.   8/23 HR of 130 - 140 Lopressor 2.5 x 2   8/23 days;( 1215)  with hypoxia. Cardizem bolus/gtt began. CTA neg. PE but has large R pleural effusion. Heparing gtt to stop @ [facility] for Thoracentesis 8/24 by Pulm.  8/24: s/p thoracentesis. + empyema  Hold Heparin gtt post midnight  for VATS Sx tomorrow.  8/25 Heparin drip stopped at [facility] ,Cardizem dripp @ 5 mg/hr   12:30 for VATS procedures    8/28 (night NP) A fib 160s, BP stable, asympotmatic. pt was using bedpan. s/p lopressor 5 mg IVP- afib 110s-120s.   recurrent afib 160s, lopressor 5mg IVP given x2 w/o break, Dr. Grant called, cardiazem 10mg IVP x1 then cardizem gtt started.   08/29- Maxwell placed for urinary retention. s/p thoracotomy , metoprolol increased to 50 bid for better rate control. s/p removal of right pleural chest tube#2 today. would hold off on picc till blood cultures negative x 24 hrs per ID. Consent in chart for PICC line palcement.   08/30:  Chest tube to water seal , air leak,   small R sided Pneumothorax Pneumothorax              Thoracic NP aware , please follow up Xray in AM            : ID cleared for PICC eval            : iv ABX TILL 09/15   8/31:Right chest tubes to continue on water seal.   Per ID,  Unasyn IV via PICC x 3 wks post VATS .   - Pending PICC placement.  - daily chest xray   8/31  PM; PICC inserted  9/2    c/w heparin drip - patient specific - adjusted to PTT 50 - 60  9/3 s/p chest tube removal, c/w Hep drip pt specific for Afib       Bladder scan >950 ccs, s/p maxwell cath removed yesterday, reinserted for retention  9/4: Pt Specific heparin gtt difficult to attain therapeutic goals.   9/5 per ID: will need ~3weeks of IV abx post VATS  we can change to q day Invanz when ready for discharge to complete ab discussed with Dr. Corral and arm procedure to be put off  for now.   	converted to Xarelto, d/c to [facility] with maxwell  	ortho consulted to adjust L arm brace, (+) swelling, no need for doppler per attending  	d/c to [facility] tomorrow

## 2017-09-03 NOTE — PROVIDER CONTACT NOTE (OTHER) - DATE AND TIME:
23-Aug-2017 01:13
01-Sep-2017 09:12
03-Sep-2017 09:50
28-Aug-2017 21:20
29-Aug-2017 07:00
29-Aug-2017 11:10
Full range of motion of upper and lower extremities, no joint tenderness/swelling.

## 2017-09-03 NOTE — PROVIDER CONTACT NOTE (OTHER) - REASON
>950 ml
Rapid AFIB 1150-160's
c/p 8/10 pain to right uper back
continue RAFIB 160-170
elevated ptt result
hematuria
Pt heart rate 140's afib, temp 100.3, O2 85% on room air; H&H low. (MEWS 8)

## 2017-09-03 NOTE — DISCHARGE NOTE ADULT - MEDICATION SUMMARY - MEDICATIONS TO TAKE
I will START or STAY ON the medications listed below when I get home from the hospital:    acetaminophen 325 mg oral tablet  -- 3 tab(s) by mouth every 6 hours, As needed, mild pain  -- Indication: For Pain    acetaminophen-oxycodone 325 mg-5 mg oral tablet  -- 1 tab(s) by mouth every 4 hours, As needed, Moderate Pain (4 - 6)  -- Indication: For Pain    acetaminophen-oxycodone 325 mg-5 mg oral tablet  -- 2 tab(s) by mouth every 4 hours, As needed, Severe Pain (7 - 10)  -- Indication: For Pain    aspirin 81 mg oral tablet  -- 1 tab(s) by mouth once a day  -- Indication: For CAD    dilTIAZem 120 mg/24 hours oral capsule, extended release  -- 1 cap(s) by mouth once a day  -- Indication: For Atrial fibrillation    rivaroxaban 20 mg oral tablet  -- 1 tab(s) by mouth every 24 hours  -- Indication: For Atrial fibrillation    Lipitor 40 mg oral tablet  -- 1 tab(s) by mouth once a day (at bedtime)  -- Indication: For HLD (hyperlipidemia)    metoprolol tartrate 50 mg oral tablet  -- 1 tab(s) by mouth 2 times a day  -- Indication: For HTN (hypertension)    ertapenem 1 g injection  -- 1 dose(s) injectable once a day  -- Indication: For Empyema, right    PriLOSEC OTC 20 mg oral delayed release tablet  -- 1 tab(s) by mouth once a day  -- Indication: For Dyspepsia I will START or STAY ON the medications listed below when I get home from the hospital:    acetaminophen 325 mg oral tablet  -- 3 tab(s) by mouth every 6 hours, As needed, mild pain  -- Indication: For Pain    acetaminophen-oxycodone 325 mg-5 mg oral tablet  -- 1 tab(s) by mouth every 4 hours, As needed, Moderate Pain (4 - 6)  -- Indication: For Pain    acetaminophen-oxycodone 325 mg-5 mg oral tablet  -- 2 tab(s) by mouth every 4 hours, As needed, Severe Pain (7 - 10)  -- Indication: For Pain    aspirin 81 mg oral tablet  -- 1 tab(s) by mouth once a day  -- Indication: For CAD    dilTIAZem 120 mg/24 hours oral capsule, extended release  -- 1 cap(s) by mouth once a day  -- Indication: For Atrial fibrillation    rivaroxaban 20 mg oral tablet  -- 1 tab(s) by mouth every 24 hours  -- Indication: For Atrial fibrillation    Lipitor 40 mg oral tablet  -- 1 tab(s) by mouth once a day (at bedtime)  -- Indication: For HLD (hyperlipidemia)    metoprolol tartrate 25 mg oral tablet  -- 1 tab(s) by mouth 2 times a day  -- Indication: For Atrial fibrillation    ertapenem 1 g injection  -- 1 dose(s) injectable once a day  -- Indication: For Empyema, right    PriLOSEC OTC 20 mg oral delayed release tablet  -- 1 tab(s) by mouth once a day  -- Indication: For Dyspepsia

## 2017-09-03 NOTE — PROGRESS NOTE ADULT - ASSESSMENT
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  maintain right chest tubes to water seal   anticipate Unasyn IV via PICC x 3 wks post VATS as per ID.   daily chest xray    leucocytosis- id eval noted, picc line  placed cw abx 3 weeks post vats  Cv stable post op  af rate control Cardizem po and metoprolol  cont a/c for af   IV abx per id  chest tube removed  left ue ext fx- ortho consult.  x ray done  failed TOV  constipation-  Miralax daily  DVT ppx   pt eval  dc planning to [facility].

## 2017-09-03 NOTE — DISCHARGE NOTE ADULT - CARE PLAN
Principal Discharge DX:	Atrial fibrillation with RVR  Goal:	Remain with controlled heart rate  Instructions for follow-up, activity and diet:	Atrial fibrillation is the most common heart rhythm problem & has the risk of stroke & heart attack  It helps if you control your blood pressure, not drink more than 1-2 alcohol drinks per day, cut down on caffeine, getting treatment for over active thyroid gland, & getting exercise  Call your doctor if you feel your heart racing or beating unusually, chest tightness or pain, lightheaded, faint, shortness of breath especially with exercise  It is important to take your heart medication as prescribed  Secondary Diagnosis:	Empyema, right  Instructions for follow-up, activity and diet:	IV antibiotics and scheduled through 9/15/17  Secondary Diagnosis:	Pleural effusion  Instructions for follow-up, activity and diet:	IV antibiotics and scheduled through 9/15/17  HOME CARE INSTRUCTIONS  Take any medicines exactly as prescribed.  Follow up with your caregiver as directed.  Monitor your exercise capacity (the amount of walking you can do before you get short of breath).  Do not smoke. Ask your caregiver for help quitting.  SEEK MEDICAL CARE IF:  Your exercise capacity seems to get worse or does not improve with time.  You do not recover from your illness.  SEEK IMMEDIATE MEDICAL CARE IF:  Shortness of breath or chest pain develops or gets worse.  You have an oral temperature above 100.6, not controlled by medicine.  You develop a new cough, especially if the mucus (phlegm) is discolored  Secondary Diagnosis:	HTN (hypertension)  Instructions for follow-up, activity and diet:	Low salt diet  Activity as tolerated.  Take all medication as prescribed.  Follow up with your medical doctor for routine blood pressure monitoring at your next visit.  Notify your doctor if you have any of the following symptoms:   Dizziness, Lightheadedness, Blurry vision, Headache, Chest pain, Shortness of breath

## 2017-09-03 NOTE — PROGRESS NOTE ADULT - PROBLEM SELECTOR PLAN 1
Chest tube removed yesterday, cxr unchanged post removal of chest tube. Chest tube site stable  Management of abx per ID Chest tube removed yesterday, cxr unchanged post removal of chest tube. Chest tube site stable  Management of abx per ID  Thoracic surgery to sign off. please Have pt follow up with Dr. Cummins in 2 weeks to have chest tube sutures removed. Her office number is [phone]

## 2017-09-03 NOTE — DISCHARGE NOTE ADULT - MEDICATION SUMMARY - MEDICATIONS TO STOP TAKING
I will STOP taking the medications listed below when I get home from the hospital:    lisinopril 10 mg oral tablet  -- 1 tab(s) by mouth once a day    Adalat CC 60 mg oral tablet, extended release  -- 1 tab(s) by mouth once a day

## 2017-09-03 NOTE — PROGRESS NOTE ADULT - SUBJECTIVE AND OBJECTIVE BOX
Follow-up Pulm Progress Note - [facility] [place]    No new respiratory events overnight.  Denies SOB/CP. Doing okay with chest tube out, had issues with urination, maxwell replaced    Medications:  MEDICATIONS  (STANDING):  atorvastatin 40 milliGRAM(s) Oral at bedtime  aspirin  chewable 81 milliGRAM(s) Oral daily  meropenem IVPB   IV Intermittent   meropenem IVPB 1000 milliGRAM(s) IV Intermittent every 8 hours  famotidine    Tablet 20 milliGRAM(s) Oral daily  metoprolol 50 milliGRAM(s) Oral two times a day  diltiazem    Tablet 30 milliGRAM(s) Oral every 6 hours  heparin  Infusion 1200 Unit(s)/Hr (12 mL/Hr) IV Continuous <Continuous>    MEDICATIONS  (PRN):  acetaminophen   Tablet 975 milliGRAM(s) Oral every 6 hours PRN mild pain  morphine  - Injectable 0.5 milliGRAM(s) IV Push every 6 hours PRN Moderate Pain (4 - 6)             Vital Signs Last 24 Hrs  T(C): 36.5 (03 Sep 2017 06:07), Max: 36.9 (02 Sep 2017 13:48)  T(F): 97.7 (03 Sep 2017 06:07), Max: 98.4 (02 Sep 2017 13:48)  HR: 86 (03 Sep 2017 06:07) (84 - 105)  BP: 144/81 (03 Sep 2017 06:07) (102/67 - 174/79)  BP(mean): --  RR: 18 (03 Sep 2017 06:07) (16 - 18)  SpO2: 95% (03 Sep 2017 06:07) (95% - 97%)          09-02 @ 07:01  -  09-03 @ 07:00  --------------------------------------------------------  IN: 1192 mL / OUT: 620 mL / NET: 572 mL          LABS:                        10.9   18.6  )-----------( 556      ( 03 Sep 2017 07:25 )             33.8     09-02    140  |  99  |  10  ----------------------------<  104<H>  4.1   |  30  |  0.39<L>    Ca    8.7      02 Sep 2017 08:23          CAPILLARY BLOOD GLUCOSE        PT/INR - ( 02 Sep 2017 06:13 )   PT: 13.4 sec;   INR: 1.24 ratio         PTT - ( 03 Sep 2017 09:47 )  PTT:39.9 sec        Physical Examination:  Awake and alert  Normocephalic atraumatic  NECK: supple, normal range of motion, no use of accessory muscles  PULM: Clear to auscultation bilaterally, with decreased breath sounds at right base  CVS: Regular rate and rhythm, no murmurs, rubs, or gallops  Abd:  soft, non tender  Extrem: No CCE, LUE in hard brace    RADIOLOGY REVIEWED  CXR:  < from: Xray Chest 1 View AP- PORTABLE-Urgent (09.02.17 @ 15:56) >  EXAM:  CHEST-PORTABLE URGENT                            PROCEDURE DATE:  09/02/2017            INTERPRETATION:    DATE OF STUDY: 9/2/17.    PRIOR: 9/1/17.    CLINICAL INDICATION: Assess small right-sided pneumothorax.    TECHNIQUE: portable chest.    FINDINGS:   One right chest tube remains in situ. Right arm PICC line in situ with   tip overlying SVC.  The cardiac silhouette is stable in appearance.   Persistent minimal right apical pneumothorax - stable compared with   9/1/17 exam.  Combination of mild pleural fluid and pleural thickening seen in mid and   lower lateral right chest.  The left lung is clear.  No left-sided pneumothorax.    IMPRESSION:   Minimal right apical pneumothorax is stable in appearance.

## 2017-09-03 NOTE — PROGRESS NOTE ADULT - SUBJECTIVE AND OBJECTIVE BOX
Patient is a 72y old  Female who presents with a chief complaint of Afib (22 Aug 2017 20:06)      SUBJECTIVE / OVERNIGHT EVENTS: No chest pain. No shortness of breath. No complaints. No events overnight. chest tube removed.     MEDICATIONS  (STANDING):  atorvastatin 40 milliGRAM(s) Oral at bedtime  aspirin  chewable 81 milliGRAM(s) Oral daily  meropenem IVPB   IV Intermittent   meropenem IVPB 1000 milliGRAM(s) IV Intermittent every 8 hours  famotidine    Tablet 20 milliGRAM(s) Oral daily  metoprolol 50 milliGRAM(s) Oral two times a day  diltiazem    Tablet 30 milliGRAM(s) Oral every 6 hours  heparin  Infusion 1100 Unit(s)/Hr (11 mL/Hr) IV Continuous <Continuous>    MEDICATIONS  (PRN):  oxyCODONE    5 mG/acetaminophen 325 mG 2 Tablet(s) Oral every 4 hours PRN Severe Pain (7 - 10)  oxyCODONE    5 mG/acetaminophen 325 mG 1 Tablet(s) Oral every 4 hours PRN Moderate Pain (4 - 6)  acetaminophen   Tablet 975 milliGRAM(s) Oral every 6 hours PRN mild pain  morphine  - Injectable 0.5 milliGRAM(s) IV Push every 6 hours PRN Moderate Pain (4 - 6)      Vital Signs Last 24 Hrs  T(C): 36.6 (02 Sep 2017 04:54), Max: 36.7 (01 Sep 2017 13:57)  T(F): 97.9 (02 Sep 2017 04:54), Max: 98.1 (01 Sep 2017 13:57)  HR: 90 (02 Sep 2017 04:54) (77 - 90)  BP: 148/85 (02 Sep 2017 04:54) (105/55 - 157/83)  BP(mean): --  RR: 18 (02 Sep 2017 04:54) (18 - 18)  SpO2: 97% (02 Sep 2017 04:54) (94% - 98%)  CAPILLARY BLOOD GLUCOSE        I&O's Summary    01 Sep 2017 07:01  -  02 Sep 2017 07:00  --------------------------------------------------------  IN: 1396 mL / OUT: 2600 mL / NET: -1204 mL        PHYSICAL EXAM:  GENERAL: NAD, well-developed  HEAD:  Atraumatic, Normocephalic  EYES: EOMI, PERRLA, conjunctiva and sclera clear  NECK: Supple, No JVD  CHEST/LUNG: Clear to auscultation bilaterally; No wheeze  HEART: Regular rate and rhythm; No murmurs, rubs, or gallops  ABDOMEN: Soft, Nontender, Nondistended; Bowel sounds present  EXTREMITIES:  2+ Peripheral Pulses, No clubbing, cyanosis, or edema  PSYCH: AAOx3  NEUROLOGY: non-focal  SKIN: No rashes or lesions    LABS:                        9.6    16.56 )-----------( 503      ( 02 Sep 2017 08:32 )             30.1     09-02    140  |  99  |  10  ----------------------------<  104<H>  4.1   |  30  |  0.39<L>    Ca    8.7      02 Sep 2017 08:23  Phos  1.9     08-31  Mg     1.7     08-31      PT/INR - ( 02 Sep 2017 06:13 )   PT: 13.4 sec;   INR: 1.24 ratio         PTT - ( 02 Sep 2017 06:13 )  PTT:128.6 sec          RADIOLOGY & ADDITIONAL TESTS:    Imaging Personally Reviewed:     Consultant(s) Notes Reviewed:      Care Discussed with Consultants/Other Providers:

## 2017-09-03 NOTE — DISCHARGE NOTE ADULT - PATIENT PORTAL LINK FT
“You can access the Follow[employer] Patient Portal, offered by [facility], by registering with the following website: [email or web address]”

## 2017-09-03 NOTE — DISCHARGE NOTE ADULT - PLAN OF CARE
Remain with controlled heart rate Atrial fibrillation is the most common heart rhythm problem & has the risk of stroke & heart attack  It helps if you control your blood pressure, not drink more than 1-2 alcohol drinks per day, cut down on caffeine, getting treatment for over active thyroid gland, & getting exercise  Call your doctor if you feel your heart racing or beating unusually, chest tightness or pain, lightheaded, faint, shortness of breath especially with exercise  It is important to take your heart medication as prescribed IV antibiotics and scheduled through 9/15/17 IV antibiotics and scheduled through 9/15/17  HOME CARE INSTRUCTIONS  Take any medicines exactly as prescribed.  Follow up with your caregiver as directed.  Monitor your exercise capacity (the amount of walking you can do before you get short of breath).  Do not smoke. Ask your caregiver for help quitting.  SEEK MEDICAL CARE IF:  Your exercise capacity seems to get worse or does not improve with time.  You do not recover from your illness.  SEEK IMMEDIATE MEDICAL CARE IF:  Shortness of breath or chest pain develops or gets worse.  You have an oral temperature above 100.6, not controlled by medicine.  You develop a new cough, especially if the mucus (phlegm) is discolored Low salt diet  Activity as tolerated.  Take all medication as prescribed.  Follow up with your medical doctor for routine blood pressure monitoring at your next visit.  Notify your doctor if you have any of the following symptoms:   Dizziness, Lightheadedness, Blurry vision, Headache, Chest pain, Shortness of breath

## 2017-09-03 NOTE — PROVIDER CONTACT NOTE (OTHER) - ACTION/TREATMENT ORDERED:
LEN Soto came to see and assess patient. Lopressor 2.5mg x2 given. Tylenol given for fever. Oxygen 4L nasal cannula placed on patient -O2 now 94-95%. H&H ordered and repeated. H&H now 10.7/33.1.
NP called/aware. Heparin drip off x one hour, restart at 1100units/hour. Repeat ptt x 6 hrs
LEN Enriquez  notified of  rapid afib 160-170's Cardizem gtt@ 5ml/hr started as ordered.  Cardizem rate changed to 10ml/hr as per NP.  Full report given to Tmaika LEDESMA.
Lopressor  5mg ivpx1 and 12 lead EKG done.  NP Mina at bedside to evaluate patient.  Call bell in easy reach.
morphine prn
NP made aware. Heparin on hold. PTT drawn. Will continue to monitor.
maxwell

## 2017-09-03 NOTE — PROGRESS NOTE ADULT - ASSESSMENT
72 year old female with htn, fracture of Left arm, GERD, HLD admitted for new onset  Afib with RVR/ PNA/ s/p thoracotomy for empyema         CV stable   rate controlled  continue cardizem 30 mg and metoprolol 50mg BID  cont a/c hep gtt for af   continue with ASA   coumadin vs noac once ok by thoracic standpoint    2. Empyema  IV abx per id  CT removed   thoracic/pulmo f/u

## 2017-09-03 NOTE — PROGRESS NOTE ADULT - ASSESSMENT
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 - procedure had to be converted to formal thoracotomy due to dense adhesions and subpulmonic location of the process - seems to be improving now with control of infection  Post-op confusion (resolved)    Recurrent atrial fibrillation with RVR now with rate control on po cardizem and oral beta blockers  Left humeral fracture    PLAN/RECOMMENDATIONS    Stable oxygenation on room air  Continue meropenem - will need a three week course from the time of surgery - s/p PICC line placement - to complete antibiotic course with home invanz once daily( depending on d/c plans)  Appreciate orthopedic follow-up - humeral fracture repair to be delayed until antibiotics completed  Pain control (IVP morphine required today)/incentive spirometry -   Wound care as per CT surgery  AF rate controlled  - cardizem po/oral beta blockers/restarted a/c convert to oral when okay with ct surgery  Unfortunately maxwell needed to be replaced.    d/c planning       Falguni López MD, [facility] - [phone]  Pulmonary Medicine

## 2017-09-03 NOTE — DISCHARGE NOTE ADULT - ADDITIONAL INSTRUCTIONS
1) Please follow up with Dr. Cummins in 2 weeks so that she can remove your Chest tube sutures. The CORRECT number to reach her is [phone]. 1) Please follow up with Dr. Cummins in 2 weeks so that she can remove your Chest tube sutures. The CORRECT number to reach her is [phone].  Follow up with pulmonology upon discharge from rehab. 1) Please follow up with Dr. Cummins in 2 weeks so that she can remove your Chest tube sutures. The CORRECT number to reach her is [phone].  Follow up with pulmonology upon discharge from rehab.  Discharge to rehab with Maxwell, maxwell care per protocol.

## 2017-09-04 LAB
ANION GAP SERPL CALC-SCNC: 12 MMOL/L — SIGNIFICANT CHANGE UP (ref 5–17)
APTT BLD: 45.3 SEC — HIGH (ref 27.5–37.4)
APTT BLD: 69.4 SEC — HIGH (ref 27.5–37.4)
APTT BLD: 97.3 SEC — HIGH (ref 27.5–37.4)
BUN SERPL-MCNC: 9 MG/DL — SIGNIFICANT CHANGE UP (ref 7–23)
CALCIUM SERPL-MCNC: 8.7 MG/DL — SIGNIFICANT CHANGE UP (ref 8.4–10.5)
CHLORIDE SERPL-SCNC: 102 MMOL/L — SIGNIFICANT CHANGE UP (ref 96–108)
CO2 SERPL-SCNC: 27 MMOL/L — SIGNIFICANT CHANGE UP (ref 22–31)
CREAT SERPL-MCNC: 0.35 MG/DL — LOW (ref 0.5–1.3)
CULTURE RESULTS: NO GROWTH — SIGNIFICANT CHANGE UP
GLUCOSE SERPL-MCNC: 99 MG/DL — SIGNIFICANT CHANGE UP (ref 70–99)
HCT VFR BLD CALC: 30.6 % — LOW (ref 34.5–45)
HGB BLD-MCNC: 10 G/DL — LOW (ref 11.5–15.5)
MCHC RBC-ENTMCNC: 31.4 PG — SIGNIFICANT CHANGE UP (ref 27–34)
MCHC RBC-ENTMCNC: 32.6 GM/DL — SIGNIFICANT CHANGE UP (ref 32–36)
MCV RBC AUTO: 96.5 FL — SIGNIFICANT CHANGE UP (ref 80–100)
PLATELET # BLD AUTO: 413 K/UL — HIGH (ref 150–400)
POTASSIUM SERPL-MCNC: 3.7 MMOL/L — SIGNIFICANT CHANGE UP (ref 3.5–5.3)
POTASSIUM SERPL-SCNC: 3.7 MMOL/L — SIGNIFICANT CHANGE UP (ref 3.5–5.3)
RBC # BLD: 3.17 M/UL — LOW (ref 3.8–5.2)
RBC # FLD: 15.7 % — HIGH (ref 10.3–14.5)
SODIUM SERPL-SCNC: 141 MMOL/L — SIGNIFICANT CHANGE UP (ref 135–145)
SPECIMEN SOURCE: SIGNIFICANT CHANGE UP
WBC # BLD: 17 K/UL — HIGH (ref 3.8–10.5)
WBC # FLD AUTO: 17 K/UL — HIGH (ref 3.8–10.5)

## 2017-09-04 RX ORDER — HEPARIN SODIUM 5000 [USP'U]/ML
1050 INJECTION INTRAVENOUS; SUBCUTANEOUS
Qty: 25000 | Refills: 0 | Status: DISCONTINUED | OUTPATIENT
Start: 2017-09-04 | End: 2017-09-04

## 2017-09-04 RX ORDER — HEPARIN SODIUM 5000 [USP'U]/ML
1100 INJECTION INTRAVENOUS; SUBCUTANEOUS
Qty: 25000 | Refills: 0 | Status: DISCONTINUED | OUTPATIENT
Start: 2017-09-04 | End: 2017-09-05

## 2017-09-04 RX ADMIN — MEROPENEM 200 MILLIGRAM(S): 1 INJECTION INTRAVENOUS at 14:03

## 2017-09-04 RX ADMIN — MEROPENEM 200 MILLIGRAM(S): 1 INJECTION INTRAVENOUS at 23:16

## 2017-09-04 RX ADMIN — HEPARIN SODIUM 11 UNIT(S)/HR: 5000 INJECTION INTRAVENOUS; SUBCUTANEOUS at 14:48

## 2017-09-04 RX ADMIN — MEROPENEM 200 MILLIGRAM(S): 1 INJECTION INTRAVENOUS at 05:51

## 2017-09-04 RX ADMIN — Medication 81 MILLIGRAM(S): at 14:04

## 2017-09-04 RX ADMIN — FAMOTIDINE 20 MILLIGRAM(S): 10 INJECTION INTRAVENOUS at 14:04

## 2017-09-04 RX ADMIN — ATORVASTATIN CALCIUM 40 MILLIGRAM(S): 80 TABLET, FILM COATED ORAL at 23:15

## 2017-09-04 RX ADMIN — HEPARIN SODIUM 10.5 UNIT(S)/HR: 5000 INJECTION INTRAVENOUS; SUBCUTANEOUS at 05:51

## 2017-09-04 RX ADMIN — Medication 50 MILLIGRAM(S): at 17:50

## 2017-09-04 RX ADMIN — Medication 50 MILLIGRAM(S): at 05:51

## 2017-09-04 NOTE — PROGRESS NOTE ADULT - SUBJECTIVE AND OBJECTIVE BOX
CC: no new complaints      PHYSICAL EXAM:    T(C): 36.6 (09-04-17 @ 05:21), Max: 36.8 (09-03-17 @ 20:09)  HR: 92 (09-04-17 @ 05:21) (84 - 114)  BP: 118/67 (09-04-17 @ 05:21) (91/50 - 118/67)  RR: 18 (09-04-17 @ 05:21) (18 - 18)  SpO2: 98% (09-04-17 @ 05:21) (96% - 99%)  Wt(kg): --  I&O's Summary    03 Sep 2017 07:01  -  04 Sep 2017 07:00  --------------------------------------------------------  IN: 1419 mL / OUT: 2500 mL / NET: -1081 mL        Appearance: Normal	  Cardiovascular: Normal S1 S2,RRR, No JVD, No murmurs  Respiratory: dec bs at the bases	  Gastrointestinal:  Soft, Non-tender, + BS	  Extremities: Normal range of motion, No clubbing, cyanosis or edema  Vascular: Peripheral pulses palpable 2+ bilaterally     LABS:	 	                          10.0   17.0  )-----------( 413      ( 04 Sep 2017 03:41 )             30.6     09-04    141  |  102  |  9   ----------------------------<  99  3.7   |  27  |  0.35<L>    Ca    8.7      04 Sep 2017 03:41        PTT - ( 04 Sep 2017 03:41 )  PTT:69.4 sec    CARDIAC MARKERS:

## 2017-09-04 NOTE — PROGRESS NOTE ADULT - SUBJECTIVE AND OBJECTIVE BOX
Follow-up Pulm Progress Note    The patient was seen and examined. Notes reviewed and discussed with staff/team as applicable      No new respiratory events overnight. Generally feeling better    Denies: increased SOB, Chest pain, increased cough, colored phlegm, hemoptysis, N/V/D, neck stiffness, dysuria      Vital Signs Last 24 Hrs  T(C): 36.6 (04 Sep 2017 05:21), Max: 36.8 (03 Sep 2017 20:09)  T(F): 97.8 (04 Sep 2017 05:21), Max: 98.2 (03 Sep 2017 20:09)  HR: 92 (04 Sep 2017 05:21) (84 - 114)  BP: 118/67 (04 Sep 2017 05:21) (91/50 - 118/67)  BP(mean): --  RR: 18 (04 Sep 2017 05:21) (18 - 18)  SpO2: 98% (04 Sep 2017 05:21) (96% - 99%)          09-03 @ 07:01  -  -04 @ 07:00  --------------------------------------------------------  IN: 1419 mL / OUT: 2500 mL / NET: -1081 mL          Medications:  MEDICATIONS  (STANDING):  atorvastatin 40 milliGRAM(s) Oral at bedtime  aspirin  chewable 81 milliGRAM(s) Oral daily  meropenem IVPB   IV Intermittent   meropenem IVPB 1000 milliGRAM(s) IV Intermittent every 8 hours  famotidine    Tablet 20 milliGRAM(s) Oral daily  metoprolol 50 milliGRAM(s) Oral two times a day  diltiazem    Tablet 30 milliGRAM(s) Oral every 6 hours  heparin  Infusion 1050 Unit(s)/Hr (10.5 mL/Hr) IV Continuous <Continuous>    MEDICATIONS  (PRN):  acetaminophen   Tablet 975 milliGRAM(s) Oral every 6 hours PRN mild pain  morphine  - Injectable 0.5 milliGRAM(s) IV Push every 6 hours PRN Moderate Pain (4 - 6)      Allergies    No Known Allergies    Intolerances      Physical Examination:    Pleasant thin white female, NAD  Neck: no JVD, LAD, accessory muscle use  PULM: Decreased BS R base  CVS: Regular rate and rhythm, S1S2  Abdomen: soft, NT/ND  Extremities: no edema  Neuro: nonfocal      LABS:                        10.0   17.0  )-----------( 413      ( 04 Sep 2017 03:41 )             30.6     09-    141  |  102  |  9   ----------------------------<  99  3.7   |  27  |  0.35<L>    Ca    8.7      04 Sep 2017 03:41            CAPILLARY BLOOD GLUCOSE        PTT - ( 04 Sep 2017 03:41 )  PTT:69.4 sec  Urinalysis Basic - ( 03 Sep 2017 12:24 )    Color: Yellow / Appearance: Clear / S.013 / pH: x  Gluc: x / Ketone: Negative  / Bili: Negative / Urobili: 1.0 mg/dL   Blood: x / Protein: Trace mg/dL / Nitrite: Negative   Leuk Esterase: Negative / RBC: 4 /HPF / WBC 1 /HPF   Sq Epi: x / Non Sq Epi: 0 /HPF / Bacteria: Negative

## 2017-09-04 NOTE — PROGRESS NOTE ADULT - ASSESSMENT
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 - procedure had to be converted to formal thoracotomy due to dense adhesions and subpulmonic location of the process - seems to be improving now with control of infection  Post-op confusion (resolved)    Recurrent atrial fibrillation with RVR now with rate control on po cardizem and oral beta blockers  Left humeral fracture    PLAN/RECOMMENDATIONS    Stable oxygenation on room air  Continue meropenem - to complete three week course from the time of surgery - s/p PICC line placement - to complete antibiotic course with home invanz once daily( depending on d/c plans)  Appreciate orthopedic follow-up - humeral fracture repair to be delayed until antibiotics completed  Pain control (IVP morphine required today)/incentive spirometry -   Wound care as per CT surgery  AF rate controlled  - cardizem po/oral beta blockers/restarted a/c convert to oral when okay with ct surgery    d/c planning    Aj Brumfield MD  Pulmonary Medicine  [phone]

## 2017-09-04 NOTE — PROGRESS NOTE ADULT - SUBJECTIVE AND OBJECTIVE BOX
Patient is a 72y old  Female who presents with a chief complaint of Afib (22 Aug 2017 20:06)      SUBJECTIVE / OVERNIGHT EVENTS: No chest pain. No shortness of breath. No complaints. No events overnight.     MEDICATIONS  (STANDING):  atorvastatin 40 milliGRAM(s) Oral at bedtime  aspirin  chewable 81 milliGRAM(s) Oral daily  meropenem IVPB   IV Intermittent   meropenem IVPB 1000 milliGRAM(s) IV Intermittent every 8 hours  famotidine    Tablet 20 milliGRAM(s) Oral daily  metoprolol 50 milliGRAM(s) Oral two times a day  diltiazem    Tablet 30 milliGRAM(s) Oral every 6 hours  heparin  Infusion 1050 Unit(s)/Hr (10.5 mL/Hr) IV Continuous <Continuous>    MEDICATIONS  (PRN):  acetaminophen   Tablet 975 milliGRAM(s) Oral every 6 hours PRN mild pain  morphine  - Injectable 0.5 milliGRAM(s) IV Push every 6 hours PRN Moderate Pain (4 - 6)      Vital Signs Last 24 Hrs  T(C): 36.6 (04 Sep 2017 05:21), Max: 36.8 (03 Sep 2017 20:09)  T(F): 97.8 (04 Sep 2017 05:21), Max: 98.2 (03 Sep 2017 20:09)  HR: 92 (04 Sep 2017 05:21) (84 - 114)  BP: 118/67 (04 Sep 2017 05:21) (91/50 - 118/67)  BP(mean): --  RR: 18 (04 Sep 2017 05:21) (18 - 18)  SpO2: 98% (04 Sep 2017 05:21) (96% - 99%)  CAPILLARY BLOOD GLUCOSE        I&O's Summary    03 Sep 2017 07:01  -  04 Sep 2017 07:00  --------------------------------------------------------  IN: 1419 mL / OUT: 2500 mL / NET: -1081 mL        PHYSICAL EXAM:  GENERAL: NAD, well-developed  HEAD:  Atraumatic, Normocephalic  EYES: EOMI, PERRLA, conjunctiva and sclera clear  NECK: Supple, No JVD  CHEST/LUNG: Clear to auscultation bilaterally; No wheeze  HEART: Regular rate and rhythm; No murmurs, rubs, or gallops  ABDOMEN: Soft, Nontender, Nondistended; Bowel sounds present  EXTREMITIES:  2+ Peripheral Pulses, No clubbing, cyanosis, or edema  PSYCH: AAOx3  NEUROLOGY: non-focal  SKIN: No rashes or lesions    LABS:                        10.0   17.0  )-----------( 413      ( 04 Sep 2017 03:41 )             30.6         141  |  102  |  9   ----------------------------<  99  3.7   |  27  |  0.35<L>    Ca    8.7      04 Sep 2017 03:41      PTT - ( 04 Sep 2017 03:41 )  PTT:69.4 sec      Urinalysis Basic - ( 03 Sep 2017 12:24 )    Color: Yellow / Appearance: Clear / S.013 / pH: x  Gluc: x / Ketone: Negative  / Bili: Negative / Urobili: 1.0 mg/dL   Blood: x / Protein: Trace mg/dL / Nitrite: Negative   Leuk Esterase: Negative / RBC: 4 /HPF / WBC 1 /HPF   Sq Epi: x / Non Sq Epi: 0 /HPF / Bacteria: Negative        RADIOLOGY & ADDITIONAL TESTS:    Imaging Personally Reviewed:    Consultant(s) Notes Reviewed:      Care Discussed with Consultants/Other Providers:

## 2017-09-05 LAB
ANION GAP SERPL CALC-SCNC: 15 MMOL/L — SIGNIFICANT CHANGE UP (ref 5–17)
APTT BLD: 47.6 SEC — HIGH (ref 27.5–37.4)
BUN SERPL-MCNC: 8 MG/DL — SIGNIFICANT CHANGE UP (ref 7–23)
CALCIUM SERPL-MCNC: 9 MG/DL — SIGNIFICANT CHANGE UP (ref 8.4–10.5)
CHLORIDE SERPL-SCNC: 101 MMOL/L — SIGNIFICANT CHANGE UP (ref 96–108)
CO2 SERPL-SCNC: 26 MMOL/L — SIGNIFICANT CHANGE UP (ref 22–31)
CREAT SERPL-MCNC: 0.37 MG/DL — LOW (ref 0.5–1.3)
GLUCOSE SERPL-MCNC: 95 MG/DL — SIGNIFICANT CHANGE UP (ref 70–99)
HCT VFR BLD CALC: 29.3 % — LOW (ref 34.5–45)
HGB BLD-MCNC: 9.2 G/DL — LOW (ref 11.5–15.5)
MCHC RBC-ENTMCNC: 29.3 PG — SIGNIFICANT CHANGE UP (ref 27–34)
MCHC RBC-ENTMCNC: 31.4 GM/DL — LOW (ref 32–36)
MCV RBC AUTO: 93.3 FL — SIGNIFICANT CHANGE UP (ref 80–100)
PLATELET # BLD AUTO: 452 K/UL — HIGH (ref 150–400)
POTASSIUM SERPL-MCNC: 3.6 MMOL/L — SIGNIFICANT CHANGE UP (ref 3.5–5.3)
POTASSIUM SERPL-SCNC: 3.6 MMOL/L — SIGNIFICANT CHANGE UP (ref 3.5–5.3)
RBC # BLD: 3.14 M/UL — LOW (ref 3.8–5.2)
RBC # FLD: 17.4 % — HIGH (ref 10.3–14.5)
SODIUM SERPL-SCNC: 142 MMOL/L — SIGNIFICANT CHANGE UP (ref 135–145)
WBC # BLD: 12.34 K/UL — HIGH (ref 3.8–10.5)
WBC # FLD AUTO: 12.34 K/UL — HIGH (ref 3.8–10.5)

## 2017-09-05 PROCEDURE — 99232 SBSQ HOSP IP/OBS MODERATE 35: CPT

## 2017-09-05 RX ORDER — METOPROLOL TARTRATE 50 MG
1 TABLET ORAL
Qty: 0 | Refills: 0 | COMMUNITY
Start: 2017-09-05

## 2017-09-05 RX ORDER — LISINOPRIL 2.5 MG/1
1 TABLET ORAL
Qty: 0 | Refills: 0 | COMMUNITY

## 2017-09-05 RX ORDER — ERTAPENEM SODIUM 1 G/1
1000 INJECTION, POWDER, LYOPHILIZED, FOR SOLUTION INTRAMUSCULAR; INTRAVENOUS EVERY 24 HOURS
Qty: 0 | Refills: 0 | Status: DISCONTINUED | OUTPATIENT
Start: 2017-09-05 | End: 2017-09-06

## 2017-09-05 RX ORDER — ERTAPENEM SODIUM 1 G/1
1 INJECTION, POWDER, LYOPHILIZED, FOR SOLUTION INTRAMUSCULAR; INTRAVENOUS
Qty: 0 | Refills: 0 | COMMUNITY
Start: 2017-09-05 | End: 2017-09-15

## 2017-09-05 RX ORDER — HEPARIN SODIUM 5000 [USP'U]/ML
1000 INJECTION INTRAVENOUS; SUBCUTANEOUS
Qty: 25000 | Refills: 0 | Status: DISCONTINUED | OUTPATIENT
Start: 2017-09-05 | End: 2017-09-05

## 2017-09-05 RX ORDER — METOPROLOL TARTRATE 50 MG
1 TABLET ORAL
Qty: 0 | Refills: 0 | COMMUNITY

## 2017-09-05 RX ORDER — ACETAMINOPHEN 500 MG
3 TABLET ORAL
Qty: 0 | Refills: 0 | COMMUNITY
Start: 2017-09-05

## 2017-09-05 RX ORDER — RIVAROXABAN 15 MG-20MG
20 KIT ORAL EVERY 24 HOURS
Qty: 0 | Refills: 0 | Status: DISCONTINUED | OUTPATIENT
Start: 2017-09-05 | End: 2017-09-06

## 2017-09-05 RX ORDER — ZOLPIDEM TARTRATE 10 MG/1
1 TABLET ORAL
Qty: 0 | Refills: 0 | COMMUNITY

## 2017-09-05 RX ORDER — OXYCODONE HYDROCHLORIDE 5 MG/1
2 TABLET ORAL
Qty: 0 | Refills: 0 | COMMUNITY
Start: 2017-09-05

## 2017-09-05 RX ORDER — OXYCODONE HYDROCHLORIDE 5 MG/1
1 TABLET ORAL
Qty: 0 | Refills: 0 | COMMUNITY
Start: 2017-09-05

## 2017-09-05 RX ORDER — RIVAROXABAN 15 MG-20MG
1 KIT ORAL
Qty: 0 | Refills: 0 | COMMUNITY
Start: 2017-09-05

## 2017-09-05 RX ORDER — OXYCODONE AND ACETAMINOPHEN 5; 325 MG/1; MG/1
2 TABLET ORAL EVERY 4 HOURS
Qty: 0 | Refills: 0 | Status: DISCONTINUED | OUTPATIENT
Start: 2017-09-05 | End: 2017-09-06

## 2017-09-05 RX ORDER — OXYCODONE AND ACETAMINOPHEN 5; 325 MG/1; MG/1
1 TABLET ORAL EVERY 4 HOURS
Qty: 0 | Refills: 0 | Status: DISCONTINUED | OUTPATIENT
Start: 2017-09-05 | End: 2017-09-06

## 2017-09-05 RX ADMIN — ERTAPENEM SODIUM 120 MILLIGRAM(S): 1 INJECTION, POWDER, LYOPHILIZED, FOR SOLUTION INTRAMUSCULAR; INTRAVENOUS at 19:45

## 2017-09-05 RX ADMIN — OXYCODONE AND ACETAMINOPHEN 2 TABLET(S): 5; 325 TABLET ORAL at 12:19

## 2017-09-05 RX ADMIN — OXYCODONE AND ACETAMINOPHEN 2 TABLET(S): 5; 325 TABLET ORAL at 23:44

## 2017-09-05 RX ADMIN — RIVAROXABAN 20 MILLIGRAM(S): KIT at 17:41

## 2017-09-05 RX ADMIN — OXYCODONE AND ACETAMINOPHEN 2 TABLET(S): 5; 325 TABLET ORAL at 23:14

## 2017-09-05 RX ADMIN — Medication 50 MILLIGRAM(S): at 05:28

## 2017-09-05 RX ADMIN — Medication 50 MILLIGRAM(S): at 19:36

## 2017-09-05 RX ADMIN — MEROPENEM 200 MILLIGRAM(S): 1 INJECTION INTRAVENOUS at 05:29

## 2017-09-05 RX ADMIN — HEPARIN SODIUM 10 UNIT(S)/HR: 5000 INJECTION INTRAVENOUS; SUBCUTANEOUS at 01:09

## 2017-09-05 RX ADMIN — OXYCODONE AND ACETAMINOPHEN 2 TABLET(S): 5; 325 TABLET ORAL at 13:15

## 2017-09-05 RX ADMIN — FAMOTIDINE 20 MILLIGRAM(S): 10 INJECTION INTRAVENOUS at 12:15

## 2017-09-05 RX ADMIN — Medication 81 MILLIGRAM(S): at 12:15

## 2017-09-05 RX ADMIN — ATORVASTATIN CALCIUM 40 MILLIGRAM(S): 80 TABLET, FILM COATED ORAL at 23:13

## 2017-09-05 NOTE — PROGRESS NOTE ADULT - ASSESSMENT
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 - procedure had to be converted to formal thoracotomy due to dense adhesions and subpulmonic location of the process - improved clinical status with control of infection    post-op confusion (resolved) - perhaps related to pain secondary to urinary retention or meds    recurrent atrial fibrillation with RVR now with rate control on po cardizem and oral beta blockers    left humeral fracture    urinary retention    PLAN/RECOMMENDATIONS    stable oxygenation on room air  on meropenem - will need a three week course from the time of surgery - s/p PICC line placement - to complete antibiotic course with invanz once daily  orthopedic follow-up - humeral fracture repair to be delayed until antibiotics completed  pain control - switched IVP morphine to as needed percocet  chest tubes removed - small residual apical PTX should resolve with conservative management  AF rate controlled  - cardizem po/oral beta blockers  switch heparin gtt to xarelto - watch for post-operative bleeding - GI prophylaxis on A/C -   cardiac meds also include ASA/lipitor  trial of void      d/w Dr. Becker    no objection to discharge to rehab    Truman Arellano MD, [facility] - [phone]  Pulmonary Medicine

## 2017-09-05 NOTE — PROGRESS NOTE ADULT - SUBJECTIVE AND OBJECTIVE BOX
[facility]     PROGRESS NOTE    CHIEF COMPLAINT: empyema    INTERVAL HISTORY: looks well; all chest tubes out; right sided chest wall pain has resolved although has lower back pain from sitting sol long; out of bed to chair; walked in the hallway; at baseline mental status; abdominal discomfort resolved with placement of maxwell catheter for urinary retention; tele with AF with controlled rate; hemodynamically stable; no fevers, chills or sweats; no dyspnea on room air, chest congestion or wheeze;     REVIEW OF SYSTEMS:  Constitutional: As per interval history  HEENT: Within normal limits  CV: As per interval history  Resp: As per interval history  GI: Within normal limits   : Within normal limits  Musculoskeletal: Within normal limits  Skin: Within normal limits  Neurological: Within normal limits  Psychiatric: Within normal limits  Endocrine: Within normal limits  Hematologic/Lymphatic: Within normal limits  Allergic/Immunologic: Within normal limits      MEDICATIONS:     Pulmonary "      Anti-microbials:  meropenem IVPB   IV Intermittent   meropenem IVPB 1000 milliGRAM(s) IV Intermittent every 8 hours      Cardiovascular:  metoprolol 50 milliGRAM(s) Oral two times a day  diltiazem    Tablet 30 milliGRAM(s) Oral every 6 hours      Other:  atorvastatin 40 milliGRAM(s) Oral at bedtime  aspirin  chewable 81 milliGRAM(s) Oral daily  famotidine    Tablet 20 milliGRAM(s) Oral daily  acetaminophen   Tablet 975 milliGRAM(s) Oral every 6 hours PRN  morphine  - Injectable 0.5 milliGRAM(s) IV Push every 6 hours PRN  heparin  Infusion 1000 Unit(s)/Hr IV Continuous <Continuous>        OBJECTIVE:        I&O's Detail    04 Sep 2017 07:01  -  05 Sep 2017 07:00  --------------------------------------------------------  IN:    heparin Infusion: 125 mL    Oral Fluid: 880 mL    Solution: 100 mL  Total IN: 1105 mL    OUT:    Indwelling Catheter - Urethral: 2200 mL  Total OUT: 2200 mL    Total NET: -1095 mL    PHYSICAL EXAM:       ICU Vital Signs Last 24 Hrs  T(C): 36.6 (05 Sep 2017 04:25), Max: 36.6 (04 Sep 2017 13:51)  T(F): 97.8 (05 Sep 2017 04:25), Max: 97.9 (04 Sep 2017 13:51)  HR: 96 (05 Sep 2017 04:25) (77 - 96)  BP: 115/69 (05 Sep 2017 04:25) (112/64 - 145/77)  BP(mean): --  ABP: --  ABP(mean): --  RR: 17 (05 Sep 2017 04:25) (17 - 18)  SpO2: 98% (05 Sep 2017 04:25) (97% - 98%) on room air     General: Awake and alert. Comfortable in bed. Appears stated age 	  HEENT:   AT/NC/Anicteric. PERRL/EOMI. Normal oral mucosa,  Neck: Supple. Trachea midline. Thyroid without enlargement/tenderness/nodules. No carotid bruit. No JVD	  Cardiovascular: Irregularly irregular rate and rhythm. S1 S2 normal. II/VI systolic murmur  Respiratory: Respirations unlabored. Improved breath sounds on the right and decreased rales -   Abdomen: Soft. Non-tender. Non-distended. No organomegaly. No masses. Normal BS. Maxwell cathter  Extremities: Warm to touch. Mild lower extremity and LUE edema; LUE in hard brace  Pulses: 2+ peripheral pulses all extremities	  Skin: Thoracotomy incision C/D/I  Lymph Nodes: Cervical, supraclavicular and axillary nodes normal  Neurological: Motor and sensory examination equal and normal. A and O x 3  Psychiatry: Appropriate mood and affect.      LABS:                        9.2    12.34 )-----------( 452      ( 05 Sep 2017 07:33 )             29.3                         10.0   17.0  )-----------( 413      ( 04 Sep 2017 03:41 )             30.6     09-04    141  |  102  |  9   ----------------------------<  99  3.7   |  27  |  0.35<L>    -02    140  |  99  |  10  ----------------------------<  104<H>  4.1   |  30  |  0.39<L>    Ca      8.7      09-04    Ca      8.7          Phos    1.9           Mg       1.7           PTT - ( 05 Sep 2017 08:06 )  PTT:47.6 sec      MICROBIOLOGY:   Urinalysis Basic - ( 03 Sep 2017 12:24 )    Color: Yellow / Appearance: Clear / S.013 / pH: x  Gluc: x / Ketone: Negative  / Bili: Negative / Urobili: 1.0 mg/dL   Blood: x / Protein: Trace mg/dL / Nitrite: Negative   Leuk Esterase: Negative / RBC: 4 /HPF / WBC 1 /HPF   Sq Epi: x / Non Sq Epi: 0 /HPF / Bacteria: Negative    Culture - Blood (17 @ 05:49)    Specimen Source: .Blood Blood-Peripheral    Culture Results:   No growth to date.    Culture - Blood (17 @ 02:31)    Specimen Source: .Blood Blood-Peripheral    Culture Results:   No growth to date.    Culture - Body Fluid with Gram Stain (17 @ 00:50)    Gram Stain:   polymorphonuclear leukocytes seen  Gram Negative Rods seen    Specimen Source: .Body Fluid Pleural Fluid    Culture Results:   Numerous Fusobacterium nucleatum    Culture - Body Fluid with Gram Stain (17 @ 13:05)    Gram Stain:   Rare polymorphonuclear leukocytes per low power field  No organisms seen    Specimen Source: .Body Fluid Pleural Fluid    Culture Results:   Moderate Fusobacterium nucleatum    RADIOLOGY:  [x] Chest radiographs reviewed and interpreted by me    < from: Xray Chest 1 View AP/PA (17 @ 17:26) >    EXAM:  CHEST SINGLE AP OR PA                            PROCEDURE DATE:  2017            INTERPRETATION:    DATE OF STUDY: 17 at 5:28PM.    PRIOR: Earlier 17 chest.    CLINICAL INDICATION: Removal of right chest tube.    TECHNIQUE: portable chest.    FINDINGS:   Right-sided PICC line tip overlies SVC.  Prior right-sided chest tube has been removed.  The cardiac silhouette is not enlarged.   Mild right apical pneumothorax is relatively stable c/w prior exam.  Mild right-sided pleural effusion and pleural thickening is stable  Left lung is clear.  No left-sided pneumothorax.    IMPRESSION:   S/P removal of right-sided chest tube.  Small right apical pneumothorax is stable.    NOHELIA PINON M.D., ATTENDING RADIOLOGIST  This document has been electronically signed. Sep  3 2017  2:12PM     < end of copied text >  ---------------------------------------------------------------------------------------------------------  < from: Xray Chest 1 View AP- PORTABLE-Urgent (17 @ 15:56) >    EXAM:  CHEST-PORTABLE URGENT                            PROCEDURE DATE:  2017            INTERPRETATION:    DATE OF STUDY: 17.    PRIOR: 17.    CLINICAL INDICATION: Assess small right-sided pneumothorax.    TECHNIQUE: portable chest.    FINDINGS:   One right chest tube remains in situ. Right arm PICC line in situ with   tip overlying SVC.  The cardiac silhouette is stable in appearance.   Persistent minimal right apical pneumothorax - stable compared with   17 exam.  Combination of mild pleural fluid and pleural thickening seen in mid and   lower lateral right chest.  The left lung is clear.  No left-sided pneumothorax.    IMPRESSION:   Minimal right apical pneumothorax is stable in appearance.    NOHELIA PINON M.D., ATTENDING RADIOLOGIST  This document has been electronically signed. Sep  3 2017  9:18AM      < end of copied text >  ---------------------------------------------------------------------------------------------------------  < from: Xray Chest 1 View AP- PORTABLE-Urgent (17 @ 15:45) >    EXAM:  CHEST-PORTABLE URGENT                            PROCEDURE DATE:  2017      INTERPRETATION:    DATE OF STUDY: 17 at 3:44PM.    PRIOR: Earlier 17 chest.    CLINICAL INDICATION: S/P removal of one right chest tube; assess   pneumothorax    TECHNIQUE: portable chest.    FINDINGS:   One of the two right chest tubes has been removed.  Right arm PICC line tip overlies SVC.  The cardiac silhouette is stable in appearance.   Minimal right apical pneumothorax persists.  Persistent mild right pleural effusion.  Persistent mid and lower right lung opacities.  The left lung is clear.    IMPRESSION:   S/P removal of one of the two right chest tubes.  Persistent minimal right apical pneumothorax.  Persistent mid and lower rightlung opacities and mild right pleural   effusion.  Left lung is clear      NOHELIA PINON M.D., ATTENDING RADIOLOGIST  This document has been electronically signed. Sep  2 2017  9:19AM      < end of copied text >    ---------------------------------------------------------------------------------------------------------    < from: Xray Chest 1 View AP -PORTABLE-Routine (17 @ 09:07) >    EXAM:  CHEST PORTABLE ROUTINE                          INTERPRETATION:  CLINICAL INFORMATION: Chest tube on waterseal.    IMPRESSION:   Right-sided chest tubes.  Small right-sided pneumothorax.  Small right and trace left pleural effusions.    ALYSSA SULLIVAN M.D., RADIOLOGY RESIDENT  This document has been electronically signed.  HARIS VELÁSQUEZ M.D., ATTENDING RADIOLOGIST  This document has been electronically signed. Aug 31 2017 12:20PM      < end of copied text >    ---------------------------------------------------------------------------------------------------------    < from: Xray Chest 1 View AP- PORTABLE-Urgent (17 @ 11:14) >    EXAM:  CHEST-PORTABLE URGENT                            PROCEDURE DATE:  2017      IMPRESSION:   Small right pneumothorax. Right-sided chest tube in place.    Bilateral pleural effusions right larger than left. Marginal interval   improvement in the right-sided pleural effusion.      SERGIO HOUGH M.D., RADIOLOGY RESIDENT  This document has been electronically signed.  WHIT SOLER M.D., ATTENDING RADIOLOGIST  This document has been electronically signed. Aug 30 2017  2:02PM      < end of copied text >  ---------------------------------------------------------------------------------------------------------      < from: Xray Chest 1 View AP- PORTABLE-Urgent (17 @ 14:14) >    EXAM:  CHEST-PORTABLE URGENT                            PROCEDURE DATE:  2017      Redemonstration of a displaced, age indeterminant left humeral fracture.    IMPRESSION:   Right-sided chest tubes.  Bilateral pleural effusions, larger on right.  No pneumothorax.    ALYSSA SULLIVAN M.D., RADIOLOGY RESIDENT  This document has been electronically signed.  HARIS VELÁSQUEZ M.D., ATTENDING RADIOLOGIST  This document has been electronically signed. Aug 29 2017  3:43PM      < end of copied text >  ---------------------------------------------------------------------------------------------------------  ---------------------------------------------------------------------------------------------------------      --------------------------------------------------------------------------------------------------------  < from: Xray Chest 1 View AP -PORTABLE-Routine (17 @ 22:15) >    EXAM:  CHEST PORTABLE ROUTINE                            PROCEDURE DATE:  2017      INTERPRETATION:    CLINICAL INDICATION: Status post surgery    TECHNIQUE: Portable frontal view of the chest.    COMPARISON: Frontal chest radiograph from 2017 at 6:27 AM.    FINDINGS:   There is a right-sided IJ central venous catheter with distal tip   terminating in the region of the SVC. There are 2 right apical chest   tubes and a right basilar chest tube in place.  Cardiac size is within normal limits.  There is worsening right basilar opacity likely representing right lower   lobe pneumonia and/or atelectasis.  Small bilateral pleural effusions. There is redemonstration of a small   right-sided pneumothorax.  There is redemonstration of a displaced, angulated midshaft humeral   fracture      IMPRESSION:   1. Worsening right lower lobe pneumonia and/or atelectasis.   2. Small bilateral pleural effusions   3. Unchanged small right apical pneumothorax.    JOSHUA RICHTER M.D., RADIOLOGY RESIDENT  This document has been electronically signed.  LUCA FITZGERALD M.D.; ATTENDING RADIOLOGIST  This document has been electronically signed. Aug 28 2017 12:03PM      < end of copied text >  ---------------------------------------------------------------------------------------------------------  < from: CT Angio Chest w/ IV Cont (17 @ 15:24) >    EXAM:  CT ANGIO CHEST (W)AW IC                            PROCEDURE DATE:  2017        INTERPRETATION:  CLINICAL INFORMATION: Hypoxia and rapid atrial   fibrillation. Pneumonia on x-ray dated 2017.    PROCEDURE:   CT Pulmonary Angiogram was performed with intravenous contrast.     Intravenous contrast: 90 ml Omnipaque 350. 10 ml discarded.    Reconstructions were performed in axial thin, axial maximum intensity   projection, sagittal and coronal planes.    COMPARISON: Chest x-raydated 2017 and 2017. MRI abdomen   2009.    FINDINGS:    LUNGS AND LARGE AIRWAYS: Patent central airways. Compressive atelectasis   of the bilateral lower lobes, greater on the right, and the right middle   lobe. Foci of peripherally oriented groundglass opacities in the left   upper lobe. Small amount of paraseptal emphysema is seen in the left   upper lobe.  PLEURA: Large multiloculated right pleural effusion with thickened and   mildly hyperdense peripheral wall, indicative of pleural thickening.   Small layering left pleural effusion.  VESSELS: No evidence of pulmonary embolism. Atherosclerotic   calcifications of the thoracic aorta and coronary arteries.  HEART: There is mild dilatation of the left atrium. No pericardial   effusion.  MEDIASTINUM AND JEAN PIERRE: No lymphadenopathy.  CHEST WALL AND LOWER NECK: Within normal limits.  UPPER ABDOMEN: Partially visualized peripherally calcified right renal   cyst, as seen on prior MRI abdomen dated 2009, with increased   calcifications from prior study. Unchanged hepatic calcification. Status   post cholecystectomy.  BONES: Degenerative spondylosis of the spine. Partially visualized   comminuted spiral fracture of the left humeral mid to distal shaft.    IMPRESSION:    No evidence of pulmonary embolism.    Large multiloculated right pleural effusion with pleural thickening,   concerning for possible infectious or neoplastic etiology. Compressive   partial atelectasis of the right lower and middle lobes.    Small left pleural effusion with associated compressive atelectasis of   the left lower lobe.    Patchy groundglass opacity in the left upper lobes which may be   infectious or related to pulmonary edema given the presence of pleural   effusion.    Partially visualized acute comminuted spiral fracture of the left humeral   mid to distal shaft.     SAVAGE CID M.D., RADIOLOGY RESIDENT  This document has been electronically signed.  JEANETH ROBERTS M.D. ATTENDING RADIOLOGIST  This document has beenelectronically signed. Aug 23 2017  4:29PM

## 2017-09-05 NOTE — PROGRESS NOTE ADULT - ASSESSMENT
72 year old female with htn, fracture of Left arm, GERD, HLD admitted for new onset  Afib with RVR/ PNA/ s/p thoracotomy for empyema    1. Afib with RVR  CV stable   rate controlled  continue cardizem 30 mg and metoprolol 50mg BID  CHADS 1 a/c rivaroxaban   continue with ASA       2. Empyema  IV abx per id  Chest tubes removed   thoracic/pulmo f/u

## 2017-09-05 NOTE — CONSULT NOTE ADULT - SUBJECTIVE AND OBJECTIVE BOX
Orthopaedic Surgery Consult Note    Patient is a 72y old  Female who presents with a chief complaint of Afib (22 Aug 2017 20:06)    HPI:  72 year old female with hx of HTN, HLD, gerd, insomnia s/p mechanical fall in 5/2017 with left humeral fracture sent from preop testing for Afib with RVR.  Patient was canceled and admitted to hospital.  CTA was performed showing no PE but patient had large pleural effusion which requried VATS and decortication.  Patient currently complaining of left arm pain from fonseca.  No numbness, weakness tingling.  Ready for discharge from medical perspective.      PAST MEDICAL & SURGICAL HISTORY:  Displaced spiral fracture of shaft of humerus, left arm, subsequent encounter for fracture with routine healing  Insomnia  GERD (gastroesophageal reflux disease)  HLD (hyperlipidemia)  HTN (hypertension)    [] No significant past history as reviewed with the patient and family    FAMILY HISTORY:    [] Family history not pertinent as reviewed with the patient and family    SOCIAL HISTORY:    MEDICATIONS  (STANDING):  atorvastatin 40 milliGRAM(s) Oral at bedtime  aspirin  chewable 81 milliGRAM(s) Oral daily  famotidine    Tablet 20 milliGRAM(s) Oral daily  metoprolol 50 milliGRAM(s) Oral two times a day  diltiazem    Tablet 30 milliGRAM(s) Oral every 6 hours  rivaroxaban 20 milliGRAM(s) Oral every 24 hours  ertapenem  IVPB 1000 milliGRAM(s) IV Intermittent every 24 hours    MEDICATIONS  (PRN):  acetaminophen   Tablet 975 milliGRAM(s) Oral every 6 hours PRN mild pain  oxyCODONE    5 mG/acetaminophen 325 mG 1 Tablet(s) Oral every 4 hours PRN Moderate Pain (4 - 6)  oxyCODONE    5 mG/acetaminophen 325 mG 2 Tablet(s) Oral every 4 hours PRN Severe Pain (7 - 10)    Allergies    No Known Allergies    Intolerances        REVIEW OF SYSTEMS  [x] All review of systems negative except for those marked.  Systemic:	[] Fever		[] Chills		[] Night sweats		[] Fatigue	[] Other  [] Cardiovascular:  [] Pulmonary:  [] Renal/Urologic:  [] Gastrointestinal:  [] Metabolic:  [] Neurologic:  [] Hematologic:  [] ENT:  [] Ophthalmologic:  [] Musculoskeletal: see HPI    Vital Signs Last 24 Hrs  T(C): 36.7 (05 Sep 2017 13:17), Max: 36.7 (05 Sep 2017 13:17)  T(F): 98.1 (05 Sep 2017 13:17), Max: 98.1 (05 Sep 2017 13:17)  HR: 85 (05 Sep 2017 13:17) (77 - 96)  BP: 101/68 (05 Sep 2017 13:17) (101/68 - 137/74)  BP(mean): --  RR: 18 (05 Sep 2017 13:17) (17 - 18)  SpO2: 98% (05 Sep 2017 13:17) (97% - 98%)    09-04 @ 07:01  -  09-05 @ 07:00  --------------------------------------------------------  IN: 1105 mL / OUT: 2200 mL / NET: -1095 mL    09-05 @ 07:01  -  09-05 @ 14:08  --------------------------------------------------------  IN: 480 mL / OUT: 0 mL / NET: 480 mL        PHYSICAL EXAM:  NAD  RUE: Skin intact  Swollen.  AIN/PIN/U intact  SILT M/U/R  WWP distally.                          9.2    12.34 )-----------( 452      ( 05 Sep 2017 07:33 )             29.3     09-05    142  |  101  |  8   ----------------------------<  95  3.6   |  26  |  0.37<L>    Ca    9.0      05 Sep 2017 07:21      PTT - ( 05 Sep 2017 08:06 )  PTT:47.6 sec      IMAGING STUDIES:  Xray left humerus: Left midshaft humerus fracture nonunion.  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

## 2017-09-05 NOTE — PROGRESS NOTE ADULT - SUBJECTIVE AND OBJECTIVE BOX
infectious diseases progress note:    Patient is a 72y old  Female who presents with a chief complaint of Afib (22 Aug 2017 20:06)        Atrial fibrillation        Allergies    No Known Allergies    Intolerances        ANTIBIOTICS/RELEVANT:  antimicrobials  ertapenem  IVPB 1000 milliGRAM(s) IV Intermittent every 24 hours    immunologic:    OTHER:  atorvastatin 40 milliGRAM(s) Oral at bedtime  aspirin  chewable 81 milliGRAM(s) Oral daily  famotidine    Tablet 20 milliGRAM(s) Oral daily  metoprolol 50 milliGRAM(s) Oral two times a day  acetaminophen   Tablet 975 milliGRAM(s) Oral every 6 hours PRN  diltiazem    Tablet 30 milliGRAM(s) Oral every 6 hours  oxyCODONE    5 mG/acetaminophen 325 mG 1 Tablet(s) Oral every 4 hours PRN  oxyCODONE    5 mG/acetaminophen 325 mG 2 Tablet(s) Oral every 4 hours PRN  rivaroxaban 20 milliGRAM(s) Oral every 24 hours      Objective:  Vital Signs Last 24 Hrs  T(C): 36.6 (05 Sep 2017 04:25), Max: 36.6 (04 Sep 2017 13:51)  T(F): 97.8 (05 Sep 2017 04:25), Max: 97.9 (04 Sep 2017 13:51)  HR: 96 (05 Sep 2017 04:25) (77 - 96)  BP: 115/69 (05 Sep 2017 04:25) (112/64 - 145/77)  BP(mean): --  RR: 17 (05 Sep 2017 04:25) (17 - 18)  SpO2: 98% (05 Sep 2017 04:25) (97% - 98%)    PHYSICAL EXAM:  Constitutional:Well-developed, well nourished--no acute distress  Eyes:OLENA, EOMI  Ear/Nose/Throat: no oral lesion, no sinus tenderness on percussion	  Neck:no JVD, no lymphadenopathy, supple  Respiratory: CTA jeaneth  Cardiovascular: S1S2 RRR, no murmurs  Gastrointestinal:soft, (+) BS, no HSM  Extremities:no e/e/c        LABS:                        9.2    12.34 )-----------( 452      ( 05 Sep 2017 07:33 )             29.3     09-04    141  |  102  |  9   ----------------------------<  99  3.7   |  27  |  0.35<L>    Ca    8.7      04 Sep 2017 03:41      PTT - ( 05 Sep 2017 08:06 )  PTT:47.6 sec  Urinalysis Basic - ( 03 Sep 2017 12:24 )    Color: Yellow / Appearance: Clear / S.013 / pH: x  Gluc: x / Ketone: Negative  / Bili: Negative / Urobili: 1.0 mg/dL   Blood: x / Protein: Trace mg/dL / Nitrite: Negative   Leuk Esterase: Negative / RBC: 4 /HPF / WBC 1 /HPF   Sq Epi: x / Non Sq Epi: 0 /HPF / Bacteria: Negative          MICROBIOLOGY:    RECENT CULTURES:        RESPIRATORY CULTURES:              RADIOLOGY & ADDITIONAL STUDIES:        Pager [phone]  After 5 pm/weekends or if no response :[phone]

## 2017-09-05 NOTE — PROGRESS NOTE ADULT - SUBJECTIVE AND OBJECTIVE BOX
CARDIOLOGY FOLLOW UP - Dr. Grant    CC no chest pain or sob       PHYSICAL EXAM:  T(C): 36.6 (09-05-17 @ 04:25), Max: 36.6 (09-04-17 @ 13:51)  HR: 96 (09-05-17 @ 04:25) (77 - 96)  BP: 115/69 (09-05-17 @ 04:25) (112/64 - 145/77)  RR: 17 (09-05-17 @ 04:25) (17 - 18)  SpO2: 98% (09-05-17 @ 04:25) (97% - 98%)  Wt(kg): --  I&O's Summary    04 Sep 2017 07:01  -  05 Sep 2017 07:00  --------------------------------------------------------  IN: 1105 mL / OUT: 2200 mL / NET: -1095 mL        Appearance: Normal	  Cardiovascular: Normal S1 S2,irregular   Respiratory: Diminished BL at base 	  Gastrointestinal:  Soft, Non-tender, + BS	  Extremities: Normal range of motion, left UE + 3 edema. Bl LE + 1-2 edema         MEDICATIONS  (STANDING):  atorvastatin 40 milliGRAM(s) Oral at bedtime  aspirin  chewable 81 milliGRAM(s) Oral daily  famotidine    Tablet 20 milliGRAM(s) Oral daily  metoprolol 50 milliGRAM(s) Oral two times a day  diltiazem    Tablet 30 milliGRAM(s) Oral every 6 hours  rivaroxaban 20 milliGRAM(s) Oral every 24 hours  ertapenem  IVPB 1000 milliGRAM(s) IV Intermittent every 24 hours      TELEMETRY: 	  Afib HR 70 - 80   ECG:  	  RADIOLOGY:   DIAGNOSTIC TESTING:  [ ] Echocardiogram:  [ ]  Catheterization:  [ ] Stress Test:    OTHER: 	    LABS:	 	                                9.2    12.34 )-----------( 452      ( 05 Sep 2017 07:33 )             29.3     09-05    142  |  101  |  8   ----------------------------<  95  3.6   |  26  |  0.37<L>    Ca    9.0      05 Sep 2017 07:21      PTT - ( 05 Sep 2017 08:06 )  PTT:47.6 sec

## 2017-09-05 NOTE — PROGRESS NOTE ADULT - ASSESSMENT
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 blood cx negative x 24, can place PCC  2. continue meropenem for now. )  3. f/u blood cx & trend WBC  4. will need ~3weeks of IV abx post VATS  we can change to q day Invanz when ready for discharge to complete ab discussed with Dr. Corral and arm procedure to be put off  for now.

## 2017-09-05 NOTE — PROGRESS NOTE ADULT - ASSESSMENT
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  maintain right chest tubes to water seal   anticipate Unasyn IV via PICC x 3 wks post VATS as per ID.   daily chest xray    leucocytosis- id eval noted, picc line  placed cw abx 3 weeks post vats  Cv stable post op  af rate control Cardizem po and metoprolol  cont a/c for af   IV abx per id  chest tube removed  left ue ext fx- ortho consult.  x ray done, swelling likely related to brace, ortho to adjust.  failed TOV. will be discharged with maxwell  constipation-  Miralax daily  DVT ppx   pt eval  dc planning to [facility].

## 2017-09-05 NOTE — PROGRESS NOTE ADULT - SUBJECTIVE AND OBJECTIVE BOX
Patient is a 72y old  Female who presents with a chief complaint of Afib (22 Aug 2017 20:06)      SUBJECTIVE / OVERNIGHT EVENTS: c/o swelling of LUE.  No chest pain. No shortness of breath. No complaints. No events overnight.     MEDICATIONS  (STANDING):  atorvastatin 40 milliGRAM(s) Oral at bedtime  aspirin  chewable 81 milliGRAM(s) Oral daily  famotidine    Tablet 20 milliGRAM(s) Oral daily  metoprolol 50 milliGRAM(s) Oral two times a day  diltiazem    Tablet 30 milliGRAM(s) Oral every 6 hours  rivaroxaban 20 milliGRAM(s) Oral every 24 hours  ertapenem  IVPB 1000 milliGRAM(s) IV Intermittent every 24 hours    MEDICATIONS  (PRN):  acetaminophen   Tablet 975 milliGRAM(s) Oral every 6 hours PRN mild pain  oxyCODONE    5 mG/acetaminophen 325 mG 1 Tablet(s) Oral every 4 hours PRN Moderate Pain (4 - 6)  oxyCODONE    5 mG/acetaminophen 325 mG 2 Tablet(s) Oral every 4 hours PRN Severe Pain (7 - 10)      Vital Signs Last 24 Hrs  T(C): 36.6 (05 Sep 2017 04:25), Max: 36.6 (04 Sep 2017 13:51)  T(F): 97.8 (05 Sep 2017 04:25), Max: 97.9 (04 Sep 2017 13:51)  HR: 96 (05 Sep 2017 04:25) (77 - 96)  BP: 115/69 (05 Sep 2017 04:25) (112/64 - 145/77)  BP(mean): --  RR: 17 (05 Sep 2017 04:25) (17 - 18)  SpO2: 98% (05 Sep 2017 04:25) (97% - 98%)  CAPILLARY BLOOD GLUCOSE        I&O's Summary    04 Sep 2017 07:01  -  05 Sep 2017 07:00  --------------------------------------------------------  IN: 1105 mL / OUT: 2200 mL / NET: -1095 mL        PHYSICAL EXAM:  GENERAL: NAD, well-developed  HEAD:  Atraumatic, Normocephalic  EYES: EOMI, PERRLA, conjunctiva and sclera clear  NECK: Supple, No JVD  CHEST/LUNG: Clear to auscultation bilaterally; No wheeze  HEART: Regular rate and rhythm; No murmurs, rubs, or gallops  ABDOMEN: Soft, Nontender, Nondistended; Bowel sounds present  EXTREMITIES:  2+ Peripheral Pulses, No clubbing, cyanosis, or edema, lue swelling  PSYCH: AAOx3  NEUROLOGY: non-focal  SKIN: No rashes or lesions    LABS:                        9.2    12.34 )-----------( 452      ( 05 Sep 2017 07:33 )             29.3     09-05    142  |  101  |  8   ----------------------------<  95  3.6   |  26  |  0.37<L>    Ca    9.0      05 Sep 2017 07:21      PTT - ( 05 Sep 2017 08:06 )  PTT:47.6 sec          RADIOLOGY & ADDITIONAL TESTS:    Imaging Personally Reviewed:    Consultant(s) Notes Reviewed:      Care Discussed with Consultants/Other Providers:

## 2017-09-06 VITALS
RESPIRATION RATE: 18 BRPM | TEMPERATURE: 98 F | HEART RATE: 101 BPM | DIASTOLIC BLOOD PRESSURE: 69 MMHG | SYSTOLIC BLOOD PRESSURE: 113 MMHG | OXYGEN SATURATION: 98 %

## 2017-09-06 LAB
ANION GAP SERPL CALC-SCNC: 15 MMOL/L — SIGNIFICANT CHANGE UP (ref 5–17)
BUN SERPL-MCNC: 8 MG/DL — SIGNIFICANT CHANGE UP (ref 7–23)
CALCIUM SERPL-MCNC: 8.9 MG/DL — SIGNIFICANT CHANGE UP (ref 8.4–10.5)
CHLORIDE SERPL-SCNC: 100 MMOL/L — SIGNIFICANT CHANGE UP (ref 96–108)
CO2 SERPL-SCNC: 26 MMOL/L — SIGNIFICANT CHANGE UP (ref 22–31)
CREAT SERPL-MCNC: 0.38 MG/DL — LOW (ref 0.5–1.3)
GLUCOSE SERPL-MCNC: 96 MG/DL — SIGNIFICANT CHANGE UP (ref 70–99)
HCT VFR BLD CALC: 30.1 % — LOW (ref 34.5–45)
HGB BLD-MCNC: 9.2 G/DL — LOW (ref 11.5–15.5)
MCHC RBC-ENTMCNC: 28.6 PG — SIGNIFICANT CHANGE UP (ref 27–34)
MCHC RBC-ENTMCNC: 30.6 GM/DL — LOW (ref 32–36)
MCV RBC AUTO: 93.5 FL — SIGNIFICANT CHANGE UP (ref 80–100)
PLATELET # BLD AUTO: 473 K/UL — HIGH (ref 150–400)
POTASSIUM SERPL-MCNC: 3.8 MMOL/L — SIGNIFICANT CHANGE UP (ref 3.5–5.3)
POTASSIUM SERPL-SCNC: 3.8 MMOL/L — SIGNIFICANT CHANGE UP (ref 3.5–5.3)
RBC # BLD: 3.22 M/UL — LOW (ref 3.8–5.2)
RBC # FLD: 17 % — HIGH (ref 10.3–14.5)
SODIUM SERPL-SCNC: 141 MMOL/L — SIGNIFICANT CHANGE UP (ref 135–145)
WBC # BLD: 14.07 K/UL — HIGH (ref 3.8–10.5)
WBC # FLD AUTO: 14.07 K/UL — HIGH (ref 3.8–10.5)

## 2017-09-06 PROCEDURE — 71010: CPT | Mod: 26

## 2017-09-06 RX ORDER — NIFEDIPINE 30 MG
1 TABLET, EXTENDED RELEASE 24 HR ORAL
Qty: 0 | Refills: 0 | COMMUNITY

## 2017-09-06 RX ORDER — METOPROLOL TARTRATE 50 MG
25 TABLET ORAL
Qty: 0 | Refills: 0 | Status: DISCONTINUED | OUTPATIENT
Start: 2017-09-06 | End: 2017-09-06

## 2017-09-06 RX ORDER — DILTIAZEM HCL 120 MG
120 CAPSULE, EXT RELEASE 24 HR ORAL DAILY
Qty: 0 | Refills: 0 | Status: DISCONTINUED | OUTPATIENT
Start: 2017-09-06 | End: 2017-09-06

## 2017-09-06 RX ORDER — METOPROLOL TARTRATE 50 MG
1 TABLET ORAL
Qty: 0 | Refills: 0 | COMMUNITY
Start: 2017-09-06

## 2017-09-06 RX ORDER — DILTIAZEM HCL 120 MG
1 CAPSULE, EXT RELEASE 24 HR ORAL
Qty: 0 | Refills: 0 | COMMUNITY
Start: 2017-09-06

## 2017-09-06 RX ADMIN — Medication 120 MILLIGRAM(S): at 12:58

## 2017-09-06 RX ADMIN — Medication 81 MILLIGRAM(S): at 11:17

## 2017-09-06 RX ADMIN — OXYCODONE AND ACETAMINOPHEN 2 TABLET(S): 5; 325 TABLET ORAL at 14:31

## 2017-09-06 RX ADMIN — FAMOTIDINE 20 MILLIGRAM(S): 10 INJECTION INTRAVENOUS at 11:12

## 2017-09-06 RX ADMIN — Medication 50 MILLIGRAM(S): at 05:33

## 2017-09-06 RX ADMIN — OXYCODONE AND ACETAMINOPHEN 2 TABLET(S): 5; 325 TABLET ORAL at 14:58

## 2017-09-06 NOTE — PROGRESS NOTE ADULT - ASSESSMENT
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 - procedure had to be converted to formal thoracotomy due to dense adhesions and subpulmonic location of the process - improved clinical status with control of infection    atrial fibrillation with RVR now with rate control on po cardizem and oral beta blockers    left humeral fracture    urinary retention    PLAN/RECOMMENDATIONS    stable oxygenation on room air  complete a 3 week course of invanz from the time of surgery  orthopedic follow-up appreciated - brace fixed - humeral fracture repair to be delayed until antibiotics completed  pain control with percocet/incentive spirometry  chest tubes removed - small residual apical PTX should resolve with conservative management - repeat CXR ordered for today  AF rate controlled  - cardizem/beta blockers po  started on xarelto - watch for post-operative bleeding - GI prophylaxis on A/C -   cardiac meds also include ASA/lipitor  maxwell catheter remains in - trial of void in rehab    d/w Dr. Becker    no objection to discharge to rehab    Truman Arellano MD, [facility] - [phone]  Pulmonary Medicine

## 2017-09-06 NOTE — OCCUPATIONAL THERAPY INITIAL EVALUATION ADULT - PERTINENT HX OF CURRENT PROBLEM, REHAB EVAL
71 yo F s/p mechanical fall in 5/2017 with L humeral fracture sent from preop testing for Afib with RVR. Pt scheduled to undergo ORIF of LUE tomorrow, now admitted for workup of Afib/rvr/sepsis. Chronic smoking history over 50 pack years, quit in May 2017, never had CT scan. Reports decreased appetite and weight loss of 5-10lbs in the past month. Lives at home with family, ambulates without cane/ walker, ambulation has been limited since the fall/fx. See below

## 2017-09-06 NOTE — OCCUPATIONAL THERAPY INITIAL EVALUATION ADULT - ADDITIONAL COMMENTS
Pt s/p flexible bronchoscopy of both lungs, VATS, thoracotomy, R side drainage of empyema and decortication   Xray Chest: S/P removal of right-sided chest tube. Small right apical pneumothorax is stable.  Xray L Humerus: Spiral fracture of the left humeral mid shaft with 3 cm of distraction 8 mm of posteromedial displacement of the distal fracture fragment.  CTA Chest: No evidence of pulmonary embolism. Large multiloculated right pleural effusion with pleural thickening, concerning for possible infectious or neoplastic etiology. Compressive partial atelectasis of the right lower and middle lobes. Small left pleural effusion with associated compressive atelectasis of   the left lower lobe. Patchy groundglass opacity in the left upper lobes which may be infectious or related to pulmonary edema given the presence of pleural effusion. Partially visualized acute comminuted spiral fracture of the left humeral mid to distal shaft.

## 2017-09-06 NOTE — PROGRESS NOTE ADULT - SUBJECTIVE AND OBJECTIVE BOX
[facility]     PROGRESS NOTE    CHIEF COMPLAINT:  empyema    INTERVAL HISTORY: looks well; all chest tubes out; right sided chest wall pain has resolved although has lower back pain from sitting so long; out of bed to chair and walkng in the hallway without shortness of breath; at baseline mental status; abdominal discomfort resolved with placement of maxwell catheter for urinary retention; tele with AF with controlled rate; hemodynamically stable; no fevers, chills or sweats; no chest congestion or wheeze;     REVIEW OF SYSTEMS:  Constitutional: As per interval history  HEENT: Within normal limits  CV: As per interval history  Resp: As per interval history  GI: Within normal limits   : Within normal limits  Musculoskeletal: Within normal limits  Skin: Within normal limits  Neurological: Within normal limits  Psychiatric: Within normal limits  Endocrine: Within normal limits  Hematologic/Lymphatic: Within normal limits  Allergic/Immunologic: Within normal limits      MEDICATIONS:     Pulmonary "      Anti-microbials:  ertapenem  IVPB 1000 milliGRAM(s) IV Intermittent every 24 hours      Cardiovascular:  metoprolol 50 milliGRAM(s) Oral two times a day  diltiazem    Tablet 30 milliGRAM(s) Oral every 6 hours      Other:  atorvastatin 40 milliGRAM(s) Oral at bedtime  aspirin  chewable 81 milliGRAM(s) Oral daily  famotidine    Tablet 20 milliGRAM(s) Oral daily  acetaminophen   Tablet 975 milliGRAM(s) Oral every 6 hours PRN  oxyCODONE    5 mG/acetaminophen 325 mG 1 Tablet(s) Oral every 4 hours PRN  oxyCODONE    5 mG/acetaminophen 325 mG 2 Tablet(s) Oral every 4 hours PRN  rivaroxaban 20 milliGRAM(s) Oral every 24 hours        OBJECTIVE:        I&O's Detail    05 Sep 2017 07:01  -  06 Sep 2017 07:00  --------------------------------------------------------  IN:    Oral Fluid: 820 mL  Total IN: 820 mL    OUT:    Indwelling Catheter - Urethral: 1200 mL  Total OUT: 1200 mL    Total NET: -380 mL    Daily Weight in k.4 (06 Sep 2017 07:55)      PHYSICAL EXAM:       ICU Vital Signs Last 24 Hrs  T(C): 36.5 (06 Sep 2017 05:34), Max: 36.7 (05 Sep 2017 13:17)  T(F): 97.7 (06 Sep 2017 05:34), Max: 98.1 (05 Sep 2017 13:17)  HR: 55 (06 Sep 2017 06:38) (55 - 90)  BP: 117/65 (06 Sep 2017 06:38) (101/68 - 138/64)  BP(mean): --  ABP: --  ABP(mean): --  RR: 18 (06 Sep 2017 05:34) (17 - 18)  SpO2: 99% (06 Sep 2017 05:34) (97% - 99%) on room air    General: Awake and alert. Comfortable in bed. Appears stated age 	  HEENT:   AT/NC/Anicteric. PERRL/EOMI. Normal oral mucosa,  Neck: Supple. Trachea midline. Thyroid without enlargement/tenderness/nodules. No carotid bruit. No JVD	  Cardiovascular: Irregularly irregular rate and rhythm. S1 S2 normal. II/VI systolic murmur  Respiratory: Respirations unlabored. Improved breath sounds on the right and decreased rales -   Abdomen: Soft. Non-tender. Non-distended. No organomegaly. No masses. Normal BS. Maxwell cathter  Extremities: Warm to touch. Resolved lower extremity edema; LUE in hard brace  Pulses: 2+ peripheral pulses all extremities	  Skin: Thoracotomy incision C/D/I  Lymph Nodes: Cervical, supraclavicular and axillary nodes normal  Neurological: Motor and sensory examination equal and normal. A and O x 3  Psychiatry: Appropriate mood and affect.         LABS:                        9.2    14.07 )-----------( 473      ( 06 Sep 2017 07:51 )             30.1                         9.2    12.34 )-----------( 452      ( 05 Sep 2017 07:33 )             29.3     09    142  |  101  |  8   ----------------------------<  95  3.6   |  26  |  0.37<L>    09-04    141  |  102  |  9   ----------------------------<  99  3.7   |  27  |  0.35<L>    Ca      9.0      -    Ca      8.7      09-04      PTT - ( 05 Sep 2017 08:06 )  PTT:47.6 sec    MICROBIOLOGY:     Urinalysis Basic - ( 03 Sep 2017 12:24 )    Color: Yellow / Appearance: Clear / S.013 / pH: x  Gluc: x / Ketone: Negative  / Bili: Negative / Urobili: 1.0 mg/dL   Blood: x / Protein: Trace mg/dL / Nitrite: Negative   Leuk Esterase: Negative / RBC: 4 /HPF / WBC 1 /HPF   Sq Epi: x / Non Sq Epi: 0 /HPF / Bacteria: Negative    Culture - Blood (17 @ 05:49)    Specimen Source: .Blood Blood-Peripheral    Culture Results:   No growth to date.    Culture - Blood (17 @ 02:31)    Specimen Source: .Blood Blood-Peripheral    Culture Results:   No growth to date.    Culture - Body Fluid with Gram Stain (17 @ 00:50)    Gram Stain:   polymorphonuclear leukocytes seen  Gram Negative Rods seen    Specimen Source: .Body Fluid Pleural Fluid    Culture Results:   Numerous Fusobacterium nucleatum    Culture - Body Fluid with Gram Stain (17 @ 13:05)    Gram Stain:   Rare polymorphonuclear leukocytes per low power field  No organisms seen    Specimen Source: .Body Fluid Pleural Fluid    Culture Results:   Moderate Fusobacterium nucleatum    RADIOLOGY:  [x] Chest radiographs reviewed and interpreted by me    < from: Xray Chest 1 View AP/PA (17 @ 17:26) >    EXAM:  CHEST SINGLE AP OR PA                            PROCEDURE DATE:  2017            INTERPRETATION:    DATE OF STUDY: 17 at 5:28PM.    PRIOR: Earlier 17 chest.    CLINICAL INDICATION: Removal of right chest tube.    TECHNIQUE: portable chest.    FINDINGS:   Right-sided PICC line tip overlies SVC.  Prior right-sided chest tube has been removed.  The cardiac silhouette is not enlarged.   Mild right apical pneumothorax is relatively stable c/w prior exam.  Mild right-sided pleural effusion and pleural thickening is stable  Left lung is clear.  No left-sided pneumothorax.    IMPRESSION:   S/P removal of right-sided chest tube.  Small right apical pneumothorax is stable.    NOHELIA PINON M.D., ATTENDING RADIOLOGIST  This document has been electronically signed. Sep  3 2017  2:12PM     < end of copied text >  ---------------------------------------------------------------------------------------------------------  < from: Xray Chest 1 View AP- PORTABLE-Urgent (17 @ 15:56) >    EXAM:  CHEST-PORTABLE URGENT                            PROCEDURE DATE:  2017            INTERPRETATION:    DATE OF STUDY: 17.    PRIOR: 17.    CLINICAL INDICATION: Assess small right-sided pneumothorax.    TECHNIQUE: portable chest.    FINDINGS:   One right chest tube remains in situ. Right arm PICC line in situ with   tip overlying SVC.  The cardiac silhouette is stable in appearance.   Persistent minimal right apical pneumothorax - stable compared with   17 exam.  Combination of mild pleural fluid and pleural thickening seen in mid and   lower lateral right chest.  The left lung is clear.  No left-sided pneumothorax.    IMPRESSION:   Minimal right apical pneumothorax is stable in appearance.    NOHELIA PINON M.D., ATTENDING RADIOLOGIST  This document has been electronically signed. Sep  3 2017  9:18AM      < end of copied text >  ---------------------------------------------------------------------------------------------------------  < from: Xray Chest 1 View AP- PORTABLE-Urgent (17 @ 15:45) >    EXAM:  CHEST-PORTABLE URGENT                            PROCEDURE DATE:  2017      INTERPRETATION:    DATE OF STUDY: 17 at 3:44PM.    PRIOR: Earlier 17 chest.    CLINICAL INDICATION: S/P removal of one right chest tube; assess   pneumothorax    TECHNIQUE: portable chest.    FINDINGS:   One of the two right chest tubes has been removed.  Right arm PICC line tip overlies SVC.  The cardiac silhouette is stable in appearance.   Minimal right apical pneumothorax persists.  Persistent mild right pleural effusion.  Persistent mid and lower right lung opacities.  The left lung is clear.    IMPRESSION:   S/P removal of one of the two right chest tubes.  Persistent minimal right apical pneumothorax.  Persistent mid and lower rightlung opacities and mild right pleural   effusion.  Left lung is clear      NOHELIA PINON M.D., ATTENDING RADIOLOGIST  This document has been electronically signed. Sep  2 2017  9:19AM      < end of copied text >    ---------------------------------------------------------------------------------------------------------    < from: Xray Chest 1 View AP -PORTABLE-Routine (17 @ 09:07) >    EXAM:  CHEST PORTABLE ROUTINE                          INTERPRETATION:  CLINICAL INFORMATION: Chest tube on waterseal.    IMPRESSION:   Right-sided chest tubes.  Small right-sided pneumothorax.  Small right and trace left pleural effusions.    ALYSSA SULLIVAN M.D., RADIOLOGY RESIDENT  This document has been electronically signed.  HARIS VELÁSQUEZ M.D., ATTENDING RADIOLOGIST  This document has been electronically signed. Aug 31 2017 12:20PM      < end of copied text >    ---------------------------------------------------------------------------------------------------------    < from: Xray Chest 1 View AP- PORTABLE-Urgent (17 @ 11:14) >    EXAM:  CHEST-PORTABLE URGENT                            PROCEDURE DATE:  2017      IMPRESSION:   Small right pneumothorax. Right-sided chest tube in place.    Bilateral pleural effusions right larger than left. Marginal interval   improvement in the right-sided pleural effusion.      SERGIO HOUGH M.D., RADIOLOGY RESIDENT  This document has been electronically signed.  WHIT SOLER M.D., ATTENDING RADIOLOGIST  This document has been electronically signed. Aug 30 2017  2:02PM      < end of copied text >  ---------------------------------------------------------------------------------------------------------      < from: Xray Chest 1 View AP- PORTABLE-Urgent (17 @ 14:14) >    EXAM:  CHEST-PORTABLE URGENT                            PROCEDURE DATE:  2017      Redemonstration of a displaced, age indeterminant left humeral fracture.    IMPRESSION:   Right-sided chest tubes.  Bilateral pleural effusions, larger on right.  No pneumothorax.    ALYSSA SULLIVAN M.D., RADIOLOGY RESIDENT  This document has been electronically signed.  HARIS VELÁSQUEZ M.D., ATTENDING RADIOLOGIST  This document has been electronically signed. Aug 29 2017  3:43PM      < end of copied text >  ---------------------------------------------------------------------------------------------------------  ---------------------------------------------------------------------------------------------------------      --------------------------------------------------------------------------------------------------------  < from: Xray Chest 1 View AP -PORTABLE-Routine (17 @ 22:15) >    EXAM:  CHEST PORTABLE ROUTINE                            PROCEDURE DATE:  2017      INTERPRETATION:    CLINICAL INDICATION: Status post surgery    TECHNIQUE: Portable frontal view of the chest.    COMPARISON: Frontal chest radiograph from 2017 at 6:27 AM.    FINDINGS:   There is a right-sided IJ central venous catheter with distal tip   terminating in the region of the SVC. There are 2 right apical chest   tubes and a right basilar chest tube in place.  Cardiac size is within normal limits.  There is worsening right basilar opacity likely representing right lower   lobe pneumonia and/or atelectasis.  Small bilateral pleural effusions. There is redemonstration of a small   right-sided pneumothorax.  There is redemonstration of a displaced, angulated midshaft humeral   fracture      IMPRESSION:   1. Worsening right lower lobe pneumonia and/or atelectasis.   2. Small bilateral pleural effusions   3. Unchanged small right apical pneumothorax.    JOSHUA RICHTER M.D., RADIOLOGY RESIDENT  This document has been electronically signed.  LUCA FITZGERALD M.D.; ATTENDING RADIOLOGIST  This document has been electronically signed. Aug 28 2017 12:03PM      < end of copied text >  ---------------------------------------------------------------------------------------------------------  < from: CT Angio Chest w/ IV Cont (17 @ 15:24) >    EXAM:  CT ANGIO CHEST (W)AW IC                            PROCEDURE DATE:  2017        INTERPRETATION:  CLINICAL INFORMATION: Hypoxia and rapid atrial   fibrillation. Pneumonia on x-ray dated 2017.    PROCEDURE:   CT Pulmonary Angiogram was performed with intravenous contrast.     Intravenous contrast: 90 ml Omnipaque 350. 10 ml discarded.    Reconstructions were performed in axial thin, axial maximum intensity   projection, sagittal and coronal planes.    COMPARISON: Chest x-raydated 2017 and 2017. MRI abdomen   2009.    FINDINGS:    LUNGS AND LARGE AIRWAYS: Patent central airways. Compressive atelectasis   of the bilateral lower lobes, greater on the right, and the right middle   lobe. Foci of peripherally oriented groundglass opacities in the left   upper lobe. Small amount of paraseptal emphysema is seen in the left   upper lobe.  PLEURA: Large multiloculated right pleural effusion with thickened and   mildly hyperdense peripheral wall, indicative of pleural thickening.   Small layering left pleural effusion.  VESSELS: No evidence of pulmonary embolism. Atherosclerotic   calcifications of the thoracic aorta and coronary arteries.  HEART: There is mild dilatation of the left atrium. No pericardial   effusion.  MEDIASTINUM AND JEAN PIERRE: No lymphadenopathy.  CHEST WALL AND LOWER NECK: Within normal limits.  UPPER ABDOMEN: Partially visualized peripherally calcified right renal   cyst, as seen on prior MRI abdomen dated 2009, with increased   calcifications from prior study. Unchanged hepatic calcification. Status   post cholecystectomy.  BONES: Degenerative spondylosis of the spine. Partially visualized   comminuted spiral fracture of the left humeral mid to distal shaft.    IMPRESSION:    No evidence of pulmonary embolism.    Large multiloculated right pleural effusion with pleural thickening,   concerning for possible infectious or neoplastic etiology. Compressive   partial atelectasis of the right lower and middle lobes.    Small left pleural effusion with associated compressive atelectasis of   the left lower lobe.    Patchy groundglass opacity in the left upper lobes which may be   infectious or related to pulmonary edema given the presence of pleural   effusion.    Partially visualized acute comminuted spiral fracture of the left humeral   mid to distal shaft.     SAVAGE CID M.D., RADIOLOGY RESIDENT  This document has been electronically signed.  JEANETH ROBERTS M.D. ATTENDING RADIOLOGIST  This document has beenelectronically signed. Aug 23 2017  4:29PM

## 2017-09-06 NOTE — OCCUPATIONAL THERAPY INITIAL EVALUATION ADULT - PLANNED THERAPY INTERVENTIONS, OT EVAL
fine motor coordination training/transfer training/ADL retraining/bed mobility training/balance training/strengthening

## 2017-09-06 NOTE — PROGRESS NOTE ADULT - ATTENDING COMMENTS
Patient seen and examined, agree with the above assessment and plan by LEN Guadarrama.  CV stable   AF rate controlled  Change cardizem to CD 120mg daily  Cont xarelto  Abx
Patient seen and examined, agree with the above assessment and plan by LEN Guadarrama.  CV stable   AF rate controlled, bradycardic overnight, brief pause(asymptomatic)  Cont cardizem CD 120mg daily  decrease BB dose  Cont xarelto  No objection to DC
Patient seen and examined, agree with the above assessment and plan by LEN Guadarrama.  Cv stable  BB/CCB for rate control  A/C   SX f/u
Patient seen and examined, agree with the above assessment and plan by LEN Guadarrama.  Transition to PO cardizem, start w Cardizem 30mg Q6 h and titrate up(will likely need 60-90mg) and wean off gtt  titrate BB  care per CTS
Patient seen and examined.  Agree with above NP note.    cv stable   af rate controlled on ccb/bb  cont meds periop  a/c on hold for VATS today   abx per medicine/pulmo  d/w pt and dtr at bedside   resume a/c post op for AF
Patient seen and examined.  Agree with above NP note.    cv stable and improved   af better controlled   cont bb  add po cardizem  titrate drip off later today if possible  cont iv a/c  f/u echo   abd per med  empyema  thoracic eval
Patient seen and examined, agree with the above assessment and plan by LEN Guadarrama.  Transition to PO cardizem, start w CD 180mg and wean off gtt  titrate BB  care per CTS
Patient was seen ands examined on rounds, agree with above H&P and A&P
patient s/p vats converted to thoracotomy with total decortication. still with air leak on both anterior and posterior tubes. minimal output - on water seal. small apical space on CXR will leave to water seal.reassess tomorrow for possible removal if no air leak.

## 2017-09-06 NOTE — PROGRESS NOTE ADULT - SUBJECTIVE AND OBJECTIVE BOX
CARDIOLOGY FOLLOW UP - Dr. Grant    CC no chest pain or sob       PHYSICAL EXAM:  T(C): 36.5 (09-06-17 @ 05:34), Max: 36.7 (09-05-17 @ 13:17)  HR: 67 (09-06-17 @ 08:51) (55 - 90)  BP: 113/51 (09-06-17 @ 08:51) (101/68 - 138/64)  RR: 18 (09-06-17 @ 05:34) (17 - 18)  SpO2: 97% (09-06-17 @ 08:51) (97% - 99%)  Wt(kg): --  I&O's Summary    05 Sep 2017 07:01  -  06 Sep 2017 07:00  --------------------------------------------------------  IN: 820 mL / OUT: 1200 mL / NET: -380 mL    06 Sep 2017 07:01  -  06 Sep 2017 11:15  --------------------------------------------------------  IN: 200 mL / OUT: 0 mL / NET: 200 mL        Appearance: Normal	  Cardiovascular: Normal S1 S2, irregular   Respiratory: Diminished at base   Gastrointestinal:  Soft, Non-tender, + BS	  Extremities: Normal range of motion, No clubbing, cyanosis or edema        MEDICATIONS  (STANDING):  atorvastatin 40 milliGRAM(s) Oral at bedtime  aspirin  chewable 81 milliGRAM(s) Oral daily  famotidine    Tablet 20 milliGRAM(s) Oral daily  metoprolol 50 milliGRAM(s) Oral two times a day  rivaroxaban 20 milliGRAM(s) Oral every 24 hours  ertapenem  IVPB 1000 milliGRAM(s) IV Intermittent every 24 hours  diltiazem    milliGRAM(s) Oral daily      TELEMETRY: Afib 50 - 60   overnight Afib HR as low as 37  2.4 sec pause noted   ECG:  	  RADIOLOGY:   DIAGNOSTIC TESTING:  [ ] Echocardiogram:  [ ]  Catheterization:  [ ] Stress Test:    OTHER: 	    LABS:	 	                                9.2    14.07 )-----------( 473      ( 06 Sep 2017 07:51 )             30.1     09-06    141  |  100  |  8   ----------------------------<  96  3.8   |  26  |  0.38<L>    Ca    8.9      06 Sep 2017 07:49      PTT - ( 05 Sep 2017 08:06 )  PTT:47.6 sec

## 2017-09-06 NOTE — PROGRESS NOTE ADULT - ASSESSMENT
72 year old female with htn, fracture of Left arm, GERD, HLD admitted for new onset  Afib with RVR/ PNA/ s/p thoracotomy for empyema    1. Afib with RVR  CV stable   HR 40- 60   continue cardizem 120 CD   decrease  metoprolol 25mg BID  CHADS 1 a/c rivaroxaban   continue with ASA       2. Empyema  IV abx per id  Chest tubes removed   thoracic/pulmo f/u     dc planning to rehab

## 2017-09-06 NOTE — OCCUPATIONAL THERAPY INITIAL EVALUATION ADULT - LIVES WITH, PROFILE
children/Pt lives in private home with daughter with daughter, 1 step to enter, walk in shower in bathroom with grab bars. Pt I in ADLs and ambulation prior to admission

## 2017-09-23 LAB
CULTURE RESULTS: SIGNIFICANT CHANGE UP
CULTURE RESULTS: SIGNIFICANT CHANGE UP
SPECIMEN SOURCE: SIGNIFICANT CHANGE UP
SPECIMEN SOURCE: SIGNIFICANT CHANGE UP

## 2017-10-14 LAB
CULTURE RESULTS: SIGNIFICANT CHANGE UP
SPECIMEN SOURCE: SIGNIFICANT CHANGE UP

## 2017-10-19 PROCEDURE — 84132 ASSAY OF SERUM POTASSIUM: CPT

## 2017-10-19 PROCEDURE — 85730 THROMBOPLASTIN TIME PARTIAL: CPT

## 2017-10-19 PROCEDURE — 88331 PATH CONSLTJ SURG 1 BLK 1SPC: CPT

## 2017-10-19 PROCEDURE — 81001 URINALYSIS AUTO W/SCOPE: CPT

## 2017-10-19 PROCEDURE — 85027 COMPLETE CBC AUTOMATED: CPT

## 2017-10-19 PROCEDURE — 82330 ASSAY OF CALCIUM: CPT

## 2017-10-19 PROCEDURE — 89051 BODY FLUID CELL COUNT: CPT

## 2017-10-19 PROCEDURE — 99285 EMERGENCY DEPT VISIT HI MDM: CPT | Mod: 25

## 2017-10-19 PROCEDURE — 85014 HEMATOCRIT: CPT

## 2017-10-19 PROCEDURE — 84100 ASSAY OF PHOSPHORUS: CPT

## 2017-10-19 PROCEDURE — 87206 SMEAR FLUORESCENT/ACID STAI: CPT

## 2017-10-19 PROCEDURE — 97165 OT EVAL LOW COMPLEX 30 MIN: CPT

## 2017-10-19 PROCEDURE — 87086 URINE CULTURE/COLONY COUNT: CPT

## 2017-10-19 PROCEDURE — 87015 SPECIMEN INFECT AGNT CONCNTJ: CPT

## 2017-10-19 PROCEDURE — 84295 ASSAY OF SERUM SODIUM: CPT

## 2017-10-19 PROCEDURE — 83036 HEMOGLOBIN GLYCOSYLATED A1C: CPT

## 2017-10-19 PROCEDURE — 82553 CREATINE MB FRACTION: CPT

## 2017-10-19 PROCEDURE — 86923 COMPATIBILITY TEST ELECTRIC: CPT

## 2017-10-19 PROCEDURE — 87449 NOS EACH ORGANISM AG IA: CPT

## 2017-10-19 PROCEDURE — 97116 GAIT TRAINING THERAPY: CPT

## 2017-10-19 PROCEDURE — 96374 THER/PROPH/DIAG INJ IV PUSH: CPT

## 2017-10-19 PROCEDURE — 97162 PT EVAL MOD COMPLEX 30 MIN: CPT

## 2017-10-19 PROCEDURE — 87116 MYCOBACTERIA CULTURE: CPT

## 2017-10-19 PROCEDURE — 88305 TISSUE EXAM BY PATHOLOGIST: CPT

## 2017-10-19 PROCEDURE — 96376 TX/PRO/DX INJ SAME DRUG ADON: CPT

## 2017-10-19 PROCEDURE — 84443 ASSAY THYROID STIM HORMONE: CPT

## 2017-10-19 PROCEDURE — 82550 ASSAY OF CK (CPK): CPT

## 2017-10-19 PROCEDURE — 84484 ASSAY OF TROPONIN QUANT: CPT

## 2017-10-19 PROCEDURE — 76604 US EXAM CHEST: CPT

## 2017-10-19 PROCEDURE — 87040 BLOOD CULTURE FOR BACTERIA: CPT

## 2017-10-19 PROCEDURE — P9016: CPT

## 2017-10-19 PROCEDURE — 86900 BLOOD TYPING SEROLOGIC ABO: CPT

## 2017-10-19 PROCEDURE — 80053 COMPREHEN METABOLIC PANEL: CPT

## 2017-10-19 PROCEDURE — C1751: CPT

## 2017-10-19 PROCEDURE — 32555 ASPIRATE PLEURA W/ IMAGING: CPT

## 2017-10-19 PROCEDURE — 93005 ELECTROCARDIOGRAM TRACING: CPT

## 2017-10-19 PROCEDURE — 82803 BLOOD GASES ANY COMBINATION: CPT

## 2017-10-19 PROCEDURE — 82947 ASSAY GLUCOSE BLOOD QUANT: CPT

## 2017-10-19 PROCEDURE — 86901 BLOOD TYPING SEROLOGIC RH(D): CPT

## 2017-10-19 PROCEDURE — 73060 X-RAY EXAM OF HUMERUS: CPT

## 2017-10-19 PROCEDURE — 87205 SMEAR GRAM STAIN: CPT

## 2017-10-19 PROCEDURE — 83735 ASSAY OF MAGNESIUM: CPT

## 2017-10-19 PROCEDURE — 71045 X-RAY EXAM CHEST 1 VIEW: CPT

## 2017-10-19 PROCEDURE — 87102 FUNGUS ISOLATION CULTURE: CPT

## 2017-10-19 PROCEDURE — P9045: CPT

## 2017-10-19 PROCEDURE — 80061 LIPID PANEL: CPT

## 2017-10-19 PROCEDURE — 82435 ASSAY OF BLOOD CHLORIDE: CPT

## 2017-10-19 PROCEDURE — 94002 VENT MGMT INPAT INIT DAY: CPT

## 2017-10-19 PROCEDURE — 88112 CYTOPATH CELL ENHANCE TECH: CPT

## 2017-10-19 PROCEDURE — 85610 PROTHROMBIN TIME: CPT

## 2017-10-19 PROCEDURE — 87070 CULTURE OTHR SPECIMN AEROBIC: CPT

## 2017-10-19 PROCEDURE — 83605 ASSAY OF LACTIC ACID: CPT

## 2017-10-19 PROCEDURE — 71275 CT ANGIOGRAPHY CHEST: CPT

## 2017-10-19 PROCEDURE — 87075 CULTR BACTERIA EXCEPT BLOOD: CPT

## 2017-10-19 PROCEDURE — C1889: CPT

## 2017-10-19 PROCEDURE — 96375 TX/PRO/DX INJ NEW DRUG ADDON: CPT

## 2017-10-19 PROCEDURE — 97110 THERAPEUTIC EXERCISES: CPT

## 2017-10-19 PROCEDURE — G0463: CPT

## 2017-10-19 PROCEDURE — 36569 INSJ PICC 5 YR+ W/O IMAGING: CPT

## 2017-10-19 PROCEDURE — 36430 TRANSFUSION BLD/BLD COMPNT: CPT

## 2017-10-19 PROCEDURE — 80048 BASIC METABOLIC PNL TOTAL CA: CPT

## 2017-10-19 PROCEDURE — 86850 RBC ANTIBODY SCREEN: CPT

## 2017-10-19 PROCEDURE — 94003 VENT MGMT INPAT SUBQ DAY: CPT

## 2017-10-19 PROCEDURE — P9059: CPT

## 2017-12-20 ENCOUNTER — OUTPATIENT (OUTPATIENT)
Dept: OUTPATIENT SERVICES | Facility: HOSPITAL | Age: 72
LOS: 1 days | End: 2017-12-20
Payer: MEDICARE

## 2017-12-20 VITALS
TEMPERATURE: 99 F | OXYGEN SATURATION: 100 % | HEART RATE: 95 BPM | RESPIRATION RATE: 14 BRPM | SYSTOLIC BLOOD PRESSURE: 180 MMHG | HEIGHT: 63 IN | DIASTOLIC BLOOD PRESSURE: 90 MMHG | WEIGHT: 110.01 LBS

## 2017-12-20 DIAGNOSIS — Z90.722 ACQUIRED ABSENCE OF OVARIES, BILATERAL: Chronic | ICD-10-CM

## 2017-12-20 DIAGNOSIS — Z01.818 ENCOUNTER FOR OTHER PREPROCEDURAL EXAMINATION: ICD-10-CM

## 2017-12-20 DIAGNOSIS — S42.342D DISPLACED SPIRAL FRACTURE OF SHAFT OF HUMERUS, LEFT ARM, SUBSEQUENT ENCOUNTER FOR FRACTURE WITH ROUTINE HEALING: ICD-10-CM

## 2017-12-20 DIAGNOSIS — Z98.890 OTHER SPECIFIED POSTPROCEDURAL STATES: Chronic | ICD-10-CM

## 2017-12-20 DIAGNOSIS — I10 ESSENTIAL (PRIMARY) HYPERTENSION: ICD-10-CM

## 2017-12-20 DIAGNOSIS — I48.91 UNSPECIFIED ATRIAL FIBRILLATION: ICD-10-CM

## 2017-12-20 LAB
ANION GAP SERPL CALC-SCNC: 12 MMOL/L — SIGNIFICANT CHANGE UP (ref 5–17)
BLD GP AB SCN SERPL QL: NEGATIVE — SIGNIFICANT CHANGE UP
BUN SERPL-MCNC: 17 MG/DL — SIGNIFICANT CHANGE UP (ref 7–23)
CALCIUM SERPL-MCNC: 10.6 MG/DL — HIGH (ref 8.4–10.5)
CHLORIDE SERPL-SCNC: 101 MMOL/L — SIGNIFICANT CHANGE UP (ref 96–108)
CO2 SERPL-SCNC: 30 MMOL/L — SIGNIFICANT CHANGE UP (ref 22–31)
CREAT SERPL-MCNC: 0.7 MG/DL — SIGNIFICANT CHANGE UP (ref 0.5–1.3)
GLUCOSE SERPL-MCNC: 102 MG/DL — HIGH (ref 70–99)
HCT VFR BLD CALC: 48.7 % — HIGH (ref 34.5–45)
HGB BLD-MCNC: 15.2 G/DL — SIGNIFICANT CHANGE UP (ref 11.5–15.5)
MCHC RBC-ENTMCNC: 27.8 PG — SIGNIFICANT CHANGE UP (ref 27–34)
MCHC RBC-ENTMCNC: 31.2 GM/DL — LOW (ref 32–36)
MCV RBC AUTO: 89 FL — SIGNIFICANT CHANGE UP (ref 80–100)
PLATELET # BLD AUTO: 272 K/UL — SIGNIFICANT CHANGE UP (ref 150–400)
POTASSIUM SERPL-MCNC: 4.6 MMOL/L — SIGNIFICANT CHANGE UP (ref 3.5–5.3)
POTASSIUM SERPL-SCNC: 4.6 MMOL/L — SIGNIFICANT CHANGE UP (ref 3.5–5.3)
RBC # BLD: 5.47 M/UL — HIGH (ref 3.8–5.2)
RBC # FLD: 16.3 % — HIGH (ref 10.3–14.5)
RH IG SCN BLD-IMP: POSITIVE — SIGNIFICANT CHANGE UP
SODIUM SERPL-SCNC: 143 MMOL/L — SIGNIFICANT CHANGE UP (ref 135–145)
WBC # BLD: 7.89 K/UL — SIGNIFICANT CHANGE UP (ref 3.8–10.5)
WBC # FLD AUTO: 7.89 K/UL — SIGNIFICANT CHANGE UP (ref 3.8–10.5)

## 2017-12-20 PROCEDURE — 86850 RBC ANTIBODY SCREEN: CPT

## 2017-12-20 PROCEDURE — 86901 BLOOD TYPING SEROLOGIC RH(D): CPT

## 2017-12-20 PROCEDURE — 85027 COMPLETE CBC AUTOMATED: CPT

## 2017-12-20 PROCEDURE — 80048 BASIC METABOLIC PNL TOTAL CA: CPT

## 2017-12-20 PROCEDURE — G0463: CPT

## 2017-12-20 PROCEDURE — 86900 BLOOD TYPING SEROLOGIC ABO: CPT

## 2017-12-20 NOTE — H&P PST ADULT - ACTIVITY
pt is able to climb stairs as needed; able to complete moderate household chores - has some difficulty because if LROM of left arm

## 2017-12-20 NOTE — H&P PST ADULT - PROBLEM SELECTOR PLAN 3
during PST visit pt was hypertensive - she feels very nervous being in the hospital  she checks her BP at home and states it is never as high as 190/90, denies CP, palpitations, SOB while in PST - will continue all prescribed medications and continue to check BP at home. If she continues to be elevated she will contact Dr. Perez

## 2017-12-20 NOTE — H&P PST ADULT - HISTORY OF PRESENT ILLNESS
71 y/o female poor historian with PMH of HTN,  HLD,  h/o mechanical fall in 5/2017 sustained left humerus fracture F/u with Orthopedic applied  hard brace on her left shoulder/arm . Pt presents to PST for scheduled ORIF left Humeral nonunion.  While in PST her pulse was rapid followed by 12 lead EKG - revealed Rapid Afib , Pt denies any CP, Palpitations ,SOB , Lightheadedness , dizziness, Fever or Chills - Vitals  /84,, RR 20, Sat 96 % RA, Temp 98.1 .Pt was evaluated by anesthesiologist  in the room . Pt was transferred to Er via stretcher with cardiac monitor .Called cardiologist Dr Eliud Perez (left message) also left message with Dr Ellis Lopez. Unable to complete PST this visit secondary to Rapid Afib -pt need to transfer to ER for emergency.    * Called received from Dr Ellis Dutta office spoke to Kimi - surgery was cancelled on 8/23/2017. 71 y/o female poor historian with pmhx of HTN, HLD, h/o mechanical fall in 5/2017 sustained left humerus fracture. Pt was scheduled for left humerus ORIF in 8/2017 however, procedure was canceled - pt was in rapid Afib while in PST and sent to ED. Since that episode, pt states she has been feeling well, no c/p CP, palpitations, SOB. Now presents for ORIF left humerus. 71 y/o female poor historian with pmhx of HTN, HLD, h/o mechanical fall in 5/2017 sustained left humerus fracture. Pt was scheduled for left humerus ORIF in 8/2017 however, procedure was canceled - pt was in rapid Afib while in PST and sent to ED. Since that episode, she has been seen by her cardiologist, Dr. Perez and is feeling well - no c/o CP, palpitations, SOB. Now presents for ORIF left humerus.

## 2017-12-20 NOTE — H&P PST ADULT - PROBLEM SELECTOR PLAN 2
was seen by Dr. Perez for cardiac eval - will stop Xarelto 3 days preop (last dose 12/30/17)   c/w metoprolol and Cardizem as prescribed

## 2017-12-20 NOTE — H&P PST ADULT - PMH
Afib  diagnosed 8/2017  Displaced spiral fracture of shaft of humerus, left arm, subsequent encounter for fracture with routine healing    GERD (gastroesophageal reflux disease)    HLD (hyperlipidemia)    HTN (hypertension)    Insomnia

## 2017-12-20 NOTE — H&P PST ADULT - PSH
S/P carotid endarterectomy  left   20 + yrs ago H/O bilateral oophorectomy  15 yrs ago  S/P carotid endarterectomy  left   20 + yrs ago

## 2017-12-20 NOTE — H&P PST ADULT - NSANTHOSAYNRD_GEN_A_CORE
No. HELENE screening performed.  STOP BANG Legend: 0-2 = LOW Risk; 3-4 = INTERMEDIATE Risk; 5-8 = HIGH Risk

## 2018-01-03 ENCOUNTER — INPATIENT (INPATIENT)
Facility: HOSPITAL | Age: 73
LOS: 1 days | Discharge: HOME HEALTH | DRG: 494 | End: 2018-01-05
Attending: ORTHOPAEDIC SURGERY | Admitting: ORTHOPAEDIC SURGERY
Payer: MEDICARE

## 2018-01-03 VITALS
DIASTOLIC BLOOD PRESSURE: 63 MMHG | HEART RATE: 78 BPM | SYSTOLIC BLOOD PRESSURE: 152 MMHG | RESPIRATION RATE: 18 BRPM | OXYGEN SATURATION: 100 % | TEMPERATURE: 97 F

## 2018-01-03 DIAGNOSIS — Z90.722 ACQUIRED ABSENCE OF OVARIES, BILATERAL: Chronic | ICD-10-CM

## 2018-01-03 DIAGNOSIS — Z98.890 OTHER SPECIFIED POSTPROCEDURAL STATES: Chronic | ICD-10-CM

## 2018-01-03 DIAGNOSIS — S42.342D DISPLACED SPIRAL FRACTURE OF SHAFT OF HUMERUS, LEFT ARM, SUBSEQUENT ENCOUNTER FOR FRACTURE WITH ROUTINE HEALING: ICD-10-CM

## 2018-01-03 PROCEDURE — 73060 X-RAY EXAM OF HUMERUS: CPT | Mod: 26,LT

## 2018-01-03 RX ORDER — DOCUSATE SODIUM 100 MG
100 CAPSULE ORAL THREE TIMES A DAY
Qty: 0 | Refills: 0 | Status: DISCONTINUED | OUTPATIENT
Start: 2018-01-03 | End: 2018-01-05

## 2018-01-03 RX ORDER — ATORVASTATIN CALCIUM 80 MG/1
1 TABLET, FILM COATED ORAL
Qty: 0 | Refills: 0 | COMMUNITY

## 2018-01-03 RX ORDER — CEFAZOLIN SODIUM 1 G
2000 VIAL (EA) INJECTION EVERY 8 HOURS
Qty: 0 | Refills: 0 | Status: COMPLETED | OUTPATIENT
Start: 2018-01-03 | End: 2018-01-03

## 2018-01-03 RX ORDER — HYDROMORPHONE HYDROCHLORIDE 2 MG/ML
0.5 INJECTION INTRAMUSCULAR; INTRAVENOUS; SUBCUTANEOUS EVERY 6 HOURS
Qty: 0 | Refills: 0 | Status: DISCONTINUED | OUTPATIENT
Start: 2018-01-03 | End: 2018-01-05

## 2018-01-03 RX ORDER — SODIUM CHLORIDE 9 MG/ML
3 INJECTION INTRAMUSCULAR; INTRAVENOUS; SUBCUTANEOUS EVERY 8 HOURS
Qty: 0 | Refills: 0 | Status: DISCONTINUED | OUTPATIENT
Start: 2018-01-03 | End: 2018-01-03

## 2018-01-03 RX ORDER — OXYCODONE HYDROCHLORIDE 5 MG/1
10 TABLET ORAL EVERY 4 HOURS
Qty: 0 | Refills: 0 | Status: DISCONTINUED | OUTPATIENT
Start: 2018-01-03 | End: 2018-01-05

## 2018-01-03 RX ORDER — CEFAZOLIN SODIUM 1 G
2000 VIAL (EA) INJECTION ONCE
Qty: 0 | Refills: 0 | Status: DISCONTINUED | OUTPATIENT
Start: 2018-01-03 | End: 2018-01-03

## 2018-01-03 RX ORDER — SODIUM CHLORIDE 9 MG/ML
1000 INJECTION, SOLUTION INTRAVENOUS
Qty: 0 | Refills: 0 | Status: DISCONTINUED | OUTPATIENT
Start: 2018-01-03 | End: 2018-01-05

## 2018-01-03 RX ORDER — ONDANSETRON 8 MG/1
4 TABLET, FILM COATED ORAL ONCE
Qty: 0 | Refills: 0 | Status: DISCONTINUED | OUTPATIENT
Start: 2018-01-03 | End: 2018-01-04

## 2018-01-03 RX ORDER — ACETAMINOPHEN 500 MG
1000 TABLET ORAL ONCE
Qty: 0 | Refills: 0 | Status: COMPLETED | OUTPATIENT
Start: 2018-01-03 | End: 2018-01-04

## 2018-01-03 RX ORDER — ASPIRIN/CALCIUM CARB/MAGNESIUM 324 MG
1 TABLET ORAL
Qty: 0 | Refills: 0 | COMMUNITY

## 2018-01-03 RX ORDER — ASPIRIN/CALCIUM CARB/MAGNESIUM 324 MG
81 TABLET ORAL DAILY
Qty: 0 | Refills: 0 | Status: DISCONTINUED | OUTPATIENT
Start: 2018-01-03 | End: 2018-01-05

## 2018-01-03 RX ORDER — ACETAMINOPHEN 500 MG
650 TABLET ORAL EVERY 6 HOURS
Qty: 0 | Refills: 0 | Status: DISCONTINUED | OUTPATIENT
Start: 2018-01-03 | End: 2018-01-05

## 2018-01-03 RX ORDER — RIVAROXABAN 15 MG-20MG
20 KIT ORAL EVERY 24 HOURS
Qty: 0 | Refills: 0 | Status: DISCONTINUED | OUTPATIENT
Start: 2018-01-04 | End: 2018-01-05

## 2018-01-03 RX ORDER — OMEPRAZOLE 10 MG/1
1 CAPSULE, DELAYED RELEASE ORAL
Qty: 0 | Refills: 0 | COMMUNITY

## 2018-01-03 RX ORDER — OXYCODONE HYDROCHLORIDE 5 MG/1
5 TABLET ORAL EVERY 4 HOURS
Qty: 0 | Refills: 0 | Status: DISCONTINUED | OUTPATIENT
Start: 2018-01-03 | End: 2018-01-05

## 2018-01-03 RX ORDER — DILTIAZEM HCL 120 MG
120 CAPSULE, EXT RELEASE 24 HR ORAL DAILY
Qty: 0 | Refills: 0 | Status: DISCONTINUED | OUTPATIENT
Start: 2018-01-03 | End: 2018-01-05

## 2018-01-03 RX ORDER — PANTOPRAZOLE SODIUM 20 MG/1
40 TABLET, DELAYED RELEASE ORAL
Qty: 0 | Refills: 0 | Status: DISCONTINUED | OUTPATIENT
Start: 2018-01-03 | End: 2018-01-05

## 2018-01-03 RX ORDER — METOPROLOL TARTRATE 50 MG
25 TABLET ORAL
Qty: 0 | Refills: 0 | Status: DISCONTINUED | OUTPATIENT
Start: 2018-01-03 | End: 2018-01-05

## 2018-01-03 RX ORDER — HYDROMORPHONE HYDROCHLORIDE 2 MG/ML
0.25 INJECTION INTRAMUSCULAR; INTRAVENOUS; SUBCUTANEOUS
Qty: 0 | Refills: 0 | Status: DISCONTINUED | OUTPATIENT
Start: 2018-01-03 | End: 2018-01-04

## 2018-01-03 RX ORDER — ATORVASTATIN CALCIUM 80 MG/1
40 TABLET, FILM COATED ORAL AT BEDTIME
Qty: 0 | Refills: 0 | Status: DISCONTINUED | OUTPATIENT
Start: 2018-01-03 | End: 2018-01-05

## 2018-01-03 RX ORDER — ONDANSETRON 8 MG/1
4 TABLET, FILM COATED ORAL EVERY 6 HOURS
Qty: 0 | Refills: 0 | Status: DISCONTINUED | OUTPATIENT
Start: 2018-01-03 | End: 2018-01-05

## 2018-01-03 RX ADMIN — SODIUM CHLORIDE 75 MILLILITER(S): 9 INJECTION, SOLUTION INTRAVENOUS at 23:11

## 2018-01-03 RX ADMIN — ATORVASTATIN CALCIUM 40 MILLIGRAM(S): 80 TABLET, FILM COATED ORAL at 20:53

## 2018-01-03 RX ADMIN — Medication 25 MILLIGRAM(S): at 18:12

## 2018-01-03 RX ADMIN — SODIUM CHLORIDE 75 MILLILITER(S): 9 INJECTION, SOLUTION INTRAVENOUS at 12:16

## 2018-01-03 RX ADMIN — HYDROMORPHONE HYDROCHLORIDE 0.25 MILLIGRAM(S): 2 INJECTION INTRAMUSCULAR; INTRAVENOUS; SUBCUTANEOUS at 10:41

## 2018-01-03 RX ADMIN — HYDROMORPHONE HYDROCHLORIDE 0.25 MILLIGRAM(S): 2 INJECTION INTRAMUSCULAR; INTRAVENOUS; SUBCUTANEOUS at 11:11

## 2018-01-03 RX ADMIN — OXYCODONE HYDROCHLORIDE 10 MILLIGRAM(S): 5 TABLET ORAL at 20:46

## 2018-01-03 RX ADMIN — HYDROMORPHONE HYDROCHLORIDE 0.5 MILLIGRAM(S): 2 INJECTION INTRAMUSCULAR; INTRAVENOUS; SUBCUTANEOUS at 13:19

## 2018-01-03 RX ADMIN — OXYCODONE HYDROCHLORIDE 10 MILLIGRAM(S): 5 TABLET ORAL at 16:12

## 2018-01-03 RX ADMIN — Medication 100 MILLIGRAM(S): at 23:10

## 2018-01-03 RX ADMIN — Medication 100 MILLIGRAM(S): at 15:31

## 2018-01-03 RX ADMIN — Medication 100 MILLIGRAM(S): at 20:53

## 2018-01-03 RX ADMIN — Medication 120 MILLIGRAM(S): at 16:31

## 2018-01-03 RX ADMIN — OXYCODONE HYDROCHLORIDE 10 MILLIGRAM(S): 5 TABLET ORAL at 21:16

## 2018-01-03 RX ADMIN — HYDROMORPHONE HYDROCHLORIDE 0.5 MILLIGRAM(S): 2 INJECTION INTRAMUSCULAR; INTRAVENOUS; SUBCUTANEOUS at 12:48

## 2018-01-03 NOTE — CHART NOTE - NSCHARTNOTEFT_GEN_A_CORE
No complaints; Denies SOB/CP/N/V.    T(C): 36.2 (01-03-18 @ 15:00)  T(F): 97.2 (01-03-18 @ 15:00)  HR: 100-111 (01-03-18 @ 16:00)  BP: 149/79 (01-03-18 @ 16:00)  RR: 20 (01-03-18 @ 16:00)  SpO2: 100% (01-03-18 @ 15:00)      Exam:   Gen: NAD    Cardio: Irregularly Irregular S1, S2    Lungs: CTA B/L    LUE:   Dressing CDI; sling on  HMV intact with good suction   Sensation/Motor grossly intact  Digits WWP; Cap refill <2 secs      A/P: 72y Female s/p L Humerus ORIF; Stable  -Pain control; Ice prn; Elevate  -DVT ppx w/SCD's; IS  -Am labs  -OT/PT eval: NWB LLE in sling  -Cont current tx     Desire Rouse PA-C  Orthopedic Surgery  Pagers [phone]

## 2018-01-04 LAB
ANION GAP SERPL CALC-SCNC: 12 MMOL/L — SIGNIFICANT CHANGE UP (ref 5–17)
BASOPHILS # BLD AUTO: 0.02 K/UL — SIGNIFICANT CHANGE UP (ref 0–0.2)
BASOPHILS NFR BLD AUTO: 0.3 % — SIGNIFICANT CHANGE UP (ref 0–2)
BUN SERPL-MCNC: 10 MG/DL — SIGNIFICANT CHANGE UP (ref 7–23)
CALCIUM SERPL-MCNC: 8.9 MG/DL — SIGNIFICANT CHANGE UP (ref 8.4–10.5)
CHLORIDE SERPL-SCNC: 102 MMOL/L — SIGNIFICANT CHANGE UP (ref 96–108)
CO2 SERPL-SCNC: 28 MMOL/L — SIGNIFICANT CHANGE UP (ref 22–31)
CREAT SERPL-MCNC: 0.53 MG/DL — SIGNIFICANT CHANGE UP (ref 0.5–1.3)
EOSINOPHIL # BLD AUTO: 0.32 K/UL — SIGNIFICANT CHANGE UP (ref 0–0.5)
EOSINOPHIL NFR BLD AUTO: 4.6 — SIGNIFICANT CHANGE UP
GLUCOSE SERPL-MCNC: 106 MG/DL — HIGH (ref 70–99)
HCT VFR BLD CALC: 34 % — LOW (ref 34.5–45)
HGB BLD-MCNC: 10.6 G/DL — LOW (ref 11.5–15.5)
IMM GRANULOCYTES NFR BLD AUTO: 0.1 % — SIGNIFICANT CHANGE UP (ref 0–1.5)
LYMPHOCYTES # BLD AUTO: 1.45 K/UL — SIGNIFICANT CHANGE UP (ref 1–3.3)
LYMPHOCYTES # BLD AUTO: 20.8 % — SIGNIFICANT CHANGE UP (ref 13–44)
MCHC RBC-ENTMCNC: 27.9 PG — SIGNIFICANT CHANGE UP (ref 27–34)
MCHC RBC-ENTMCNC: 31.2 GM/DL — LOW (ref 32–36)
MCV RBC AUTO: 89.5 FL — SIGNIFICANT CHANGE UP (ref 80–100)
MONOCYTES # BLD AUTO: 1.08 K/UL — HIGH (ref 0–0.9)
MONOCYTES NFR BLD AUTO: 15.5 % — HIGH (ref 2–14)
NEUTROPHILS # BLD AUTO: 4.1 K/UL — SIGNIFICANT CHANGE UP (ref 1.8–7.4)
NEUTROPHILS NFR BLD AUTO: 58.7 % — SIGNIFICANT CHANGE UP (ref 43–77)
PLATELET # BLD AUTO: 190 K/UL — SIGNIFICANT CHANGE UP (ref 150–400)
POTASSIUM SERPL-MCNC: 4.2 MMOL/L — SIGNIFICANT CHANGE UP (ref 3.5–5.3)
POTASSIUM SERPL-SCNC: 4.2 MMOL/L — SIGNIFICANT CHANGE UP (ref 3.5–5.3)
RBC # BLD: 3.8 M/UL — SIGNIFICANT CHANGE UP (ref 3.8–5.2)
RBC # FLD: 17.3 % — HIGH (ref 10.3–14.5)
SODIUM SERPL-SCNC: 142 MMOL/L — SIGNIFICANT CHANGE UP (ref 135–145)
WBC # BLD: 6.98 K/UL — SIGNIFICANT CHANGE UP (ref 3.8–10.5)
WBC # FLD AUTO: 6.98 K/UL — SIGNIFICANT CHANGE UP (ref 3.8–10.5)

## 2018-01-04 RX ADMIN — Medication 400 MILLIGRAM(S): at 06:49

## 2018-01-04 RX ADMIN — OXYCODONE HYDROCHLORIDE 10 MILLIGRAM(S): 5 TABLET ORAL at 12:45

## 2018-01-04 RX ADMIN — ONDANSETRON 4 MILLIGRAM(S): 8 TABLET, FILM COATED ORAL at 15:44

## 2018-01-04 RX ADMIN — Medication 25 MILLIGRAM(S): at 06:49

## 2018-01-04 RX ADMIN — SODIUM CHLORIDE 75 MILLILITER(S): 9 INJECTION, SOLUTION INTRAVENOUS at 12:47

## 2018-01-04 RX ADMIN — ATORVASTATIN CALCIUM 40 MILLIGRAM(S): 80 TABLET, FILM COATED ORAL at 23:16

## 2018-01-04 RX ADMIN — Medication 100 MILLIGRAM(S): at 23:16

## 2018-01-04 RX ADMIN — Medication 120 MILLIGRAM(S): at 06:49

## 2018-01-04 RX ADMIN — PANTOPRAZOLE SODIUM 40 MILLIGRAM(S): 20 TABLET, DELAYED RELEASE ORAL at 06:49

## 2018-01-04 RX ADMIN — Medication 81 MILLIGRAM(S): at 12:45

## 2018-01-04 RX ADMIN — OXYCODONE HYDROCHLORIDE 10 MILLIGRAM(S): 5 TABLET ORAL at 13:01

## 2018-01-04 RX ADMIN — Medication 25 MILLIGRAM(S): at 18:24

## 2018-01-04 RX ADMIN — RIVAROXABAN 20 MILLIGRAM(S): KIT at 06:49

## 2018-01-04 RX ADMIN — Medication 1000 MILLIGRAM(S): at 07:20

## 2018-01-04 RX ADMIN — Medication 100 MILLIGRAM(S): at 12:45

## 2018-01-04 RX ADMIN — Medication 100 MILLIGRAM(S): at 06:49

## 2018-01-04 NOTE — PHYSICAL THERAPY INITIAL EVALUATION ADULT - DISCHARGE DISPOSITION, PT EVAL
home w/ home PT/for balance, gait and strengthening, and assistance for functional activities, pt states family available to assist

## 2018-01-04 NOTE — PHYSICAL THERAPY INITIAL EVALUATION ADULT - ACTIVE RANGE OF MOTION EXAMINATION, REHAB EVAL
Right UE Active ROM was WFL (within functional limits)/bilateral  lower extremity Active ROM was WFL (within functional limits)/left UE NT

## 2018-01-04 NOTE — PROGRESS NOTE ADULT - SUBJECTIVE AND OBJECTIVE BOX
No complaints; Denies SOB/CP/N/V.  ICU Vital Signs Last 24 Hrs  T(C): 36.5 (04 Jan 2018 04:43), Max: 37.6 (03 Jan 2018 19:00)  T(F): 97.7 (04 Jan 2018 04:43), Max: 99.7 (03 Jan 2018 19:00)  HR: 85 (04 Jan 2018 04:43) (72 - 125)  BP: 128/73 (04 Jan 2018 04:43) (105/63 - 172/76)  BP(mean): 86 (03 Jan 2018 23:00) (81 - 101)  ABP: --  ABP(mean): --  RR: 16 (04 Jan 2018 04:43) (16 - 20)  SpO2: 98% (04 Jan 2018 04:43) (95% - 100%)        Exam:   Gen: NAD    LUE:   Dressing CDI; sling on  HMV intact with good suction   Sensation/Motor grossly intact  Digits WWP; Cap refill <2 secs      A/P: 72y Female s/p L Humerus ORIF; Stable  -Pain control; Ice prn; Elevate  -DVT ppx w/SCD's; IS  -Am labs  -OT/PT eval: NWB LLE in sling  -Cont current tx

## 2018-01-04 NOTE — OCCUPATIONAL THERAPY INITIAL EVALUATION ADULT - ANTICIPATED DISCHARGE DISPOSITION, OT EVAL
Home with home OT for home safety evaluation, functional mobility, balance and ADLs. Home with supervision/assist for all functional mobility and ADLs.

## 2018-01-04 NOTE — PHYSICAL THERAPY INITIAL EVALUATION ADULT - CRITERIA FOR SKILLED THERAPEUTIC INTERVENTIONS
anticipated discharge recommendation/predicted duration of therapy intervention/anticipated equipment needs at discharge/impairments found/functional limitations in following categories/therapy frequency

## 2018-01-04 NOTE — PATIENT PROFILE ADULT. - CAREGIVER
Infant Discharged home with foster mom Justyna Mcmullen.  Infant was wheeled in secured car seat with myself and foster mom. Foster mom has no additional questions or concerns. Infant will follow up with Ped and Cardiologist by mid next week.    No

## 2018-01-04 NOTE — OCCUPATIONAL THERAPY INITIAL EVALUATION ADULT - RANGE OF MOTION EXAMINATION, UPPER EXTREMITY
Right UE Active ROM was WFL (within functional limits)/L shoulder PROM to 35*, L elbow PROM to 45*, L wrist AROM flexion to neutral, L wrist AROM extension WFL, L digit AROM flexion to half full ROM,

## 2018-01-04 NOTE — PHYSICAL THERAPY INITIAL EVALUATION ADULT - ADDITIONAL COMMENTS
pt lives with daughter and grandkids in private house, stairs to basement only. pt independent with mobility, owns 3prong cane. pt states family available to assist

## 2018-01-04 NOTE — PHYSICAL THERAPY INITIAL EVALUATION ADULT - PERTINENT HX OF CURRENT PROBLEM, REHAB EVAL
73 y/o female poor historian with pmhx of HTN, HLD, h/o mechanical fall in 5/2017 sustained left humerus fracture. Pt was scheduled for left humerus ORIF in 8/2017 however, procedure was canceled - pt was in rapid Afib while in PST and sent to ED. Since that episode, she has been seen by her cardiologist, Dr. Perez and is feeling well - no c/o CP, palpitations, SOB. s/p ORIF left humerus on 1/3

## 2018-01-05 VITALS
DIASTOLIC BLOOD PRESSURE: 47 MMHG | HEART RATE: 79 BPM | TEMPERATURE: 98 F | RESPIRATION RATE: 18 BRPM | SYSTOLIC BLOOD PRESSURE: 117 MMHG | OXYGEN SATURATION: 99 %

## 2018-01-05 LAB
HCT VFR BLD CALC: 28.1 % — LOW (ref 34.5–45)
HGB BLD-MCNC: 9.3 G/DL — LOW (ref 11.5–15.5)
MCHC RBC-ENTMCNC: 29.9 PG — SIGNIFICANT CHANGE UP (ref 27–34)
MCHC RBC-ENTMCNC: 33.2 GM/DL — SIGNIFICANT CHANGE UP (ref 32–36)
MCV RBC AUTO: 90.1 FL — SIGNIFICANT CHANGE UP (ref 80–100)
PLATELET # BLD AUTO: 153 K/UL — SIGNIFICANT CHANGE UP (ref 150–400)
RBC # BLD: 3.11 M/UL — LOW (ref 3.8–5.2)
RBC # FLD: 15.2 % — HIGH (ref 10.3–14.5)
WBC # BLD: 9.3 K/UL — SIGNIFICANT CHANGE UP (ref 3.8–10.5)
WBC # FLD AUTO: 9.3 K/UL — SIGNIFICANT CHANGE UP (ref 3.8–10.5)

## 2018-01-05 PROCEDURE — 97116 GAIT TRAINING THERAPY: CPT

## 2018-01-05 PROCEDURE — 97162 PT EVAL MOD COMPLEX 30 MIN: CPT

## 2018-01-05 PROCEDURE — 73060 X-RAY EXAM OF HUMERUS: CPT

## 2018-01-05 PROCEDURE — C1889: CPT

## 2018-01-05 PROCEDURE — 97110 THERAPEUTIC EXERCISES: CPT

## 2018-01-05 PROCEDURE — 80048 BASIC METABOLIC PNL TOTAL CA: CPT

## 2018-01-05 PROCEDURE — 97165 OT EVAL LOW COMPLEX 30 MIN: CPT

## 2018-01-05 PROCEDURE — 97530 THERAPEUTIC ACTIVITIES: CPT

## 2018-01-05 PROCEDURE — 76000 FLUOROSCOPY <1 HR PHYS/QHP: CPT

## 2018-01-05 PROCEDURE — C1713: CPT

## 2018-01-05 PROCEDURE — 85027 COMPLETE CBC AUTOMATED: CPT

## 2018-01-05 RX ORDER — ACETAMINOPHEN 500 MG
2 TABLET ORAL
Qty: 0 | Refills: 0 | COMMUNITY
Start: 2018-01-05

## 2018-01-05 RX ORDER — DOCUSATE SODIUM 100 MG
1 CAPSULE ORAL
Qty: 0 | Refills: 0 | COMMUNITY
Start: 2018-01-05

## 2018-01-05 RX ORDER — OXYCODONE HYDROCHLORIDE 5 MG/1
1 TABLET ORAL
Qty: 80 | Refills: 0 | OUTPATIENT
Start: 2018-01-05

## 2018-01-05 RX ORDER — SENNA PLUS 8.6 MG/1
2 TABLET ORAL
Qty: 0 | Refills: 0 | COMMUNITY
Start: 2018-01-05

## 2018-01-05 RX ORDER — SENNA PLUS 8.6 MG/1
2 TABLET ORAL AT BEDTIME
Qty: 0 | Refills: 0 | Status: DISCONTINUED | OUTPATIENT
Start: 2018-01-05 | End: 2018-01-05

## 2018-01-05 RX ORDER — OXYCODONE HYDROCHLORIDE 5 MG/1
1 TABLET ORAL
Qty: 0 | Refills: 0 | COMMUNITY
Start: 2018-01-05

## 2018-01-05 RX ADMIN — Medication 100 MILLIGRAM(S): at 05:28

## 2018-01-05 RX ADMIN — RIVAROXABAN 20 MILLIGRAM(S): KIT at 05:28

## 2018-01-05 RX ADMIN — OXYCODONE HYDROCHLORIDE 10 MILLIGRAM(S): 5 TABLET ORAL at 15:17

## 2018-01-05 RX ADMIN — OXYCODONE HYDROCHLORIDE 10 MILLIGRAM(S): 5 TABLET ORAL at 02:56

## 2018-01-05 RX ADMIN — Medication 81 MILLIGRAM(S): at 11:34

## 2018-01-05 RX ADMIN — OXYCODONE HYDROCHLORIDE 10 MILLIGRAM(S): 5 TABLET ORAL at 14:49

## 2018-01-05 RX ADMIN — Medication 25 MILLIGRAM(S): at 05:28

## 2018-01-05 RX ADMIN — PANTOPRAZOLE SODIUM 40 MILLIGRAM(S): 20 TABLET, DELAYED RELEASE ORAL at 05:27

## 2018-01-05 RX ADMIN — Medication 120 MILLIGRAM(S): at 05:28

## 2018-01-05 RX ADMIN — OXYCODONE HYDROCHLORIDE 10 MILLIGRAM(S): 5 TABLET ORAL at 09:25

## 2018-01-05 RX ADMIN — OXYCODONE HYDROCHLORIDE 10 MILLIGRAM(S): 5 TABLET ORAL at 09:55

## 2018-01-05 NOTE — PROGRESS NOTE ADULT - SUBJECTIVE AND OBJECTIVE BOX
Pt seen and examined. pain controlled. No complaints; Denies SOB/CP/N/V.        Exam:   Gen: NAD    LUE:   Dressing CDI; sling on  HMV intact with good suction   Sensation/Motor grossly intact  Digits WWP; Cap refill <2 secs      A/P: 72y Female s/p L Humerus ORIF; Stable  -Pain control; Ice prn; Elevate  -DVT ppx w/SCD's; IS  -Am labs  -OT/PT eval: NWB LLE in sling  -Cont current tx

## 2018-01-05 NOTE — DISCHARGE NOTE ADULT - PLAN OF CARE
pain free activities fo daily living DIET: resume regular diet regimen   DVT PROPHYLAXIS: xarelto 20mg daily  WEIGHT-BEARING STATUS: non-weight bearing left arm; gentle passive ROM of left shoulder   active and passive ROM of left elbow/wrist/hand  BATHING: keep dressing clean and dry   DRESSING CHANGES: do not change dressing; keep dressing clean and dry DIET: resume regular diet regimen   DVT PROPHYLAXIS: xarelto 20mg daily  WEIGHT-BEARING STATUS: non-weight bearing left arm; gentle passive ROM of left shoulder   active and passive ROM of left elbow/wrist/hand  BATHING: keep dressing clean and dry   DRESSING CHANGES: do not change dressing; keep dressing clean and dry  Keep Aquacel dressing clean and dry   ice to affected incision every 4-6 hours x 72 hours   elevate affected extremity when @ rest

## 2018-01-05 NOTE — DISCHARGE NOTE ADULT - CARE PROVIDER_API CALL
John Corral), Orthopaedic Surgery  [address] [place] [id number]  Phone: [phone]  Fax: [phone]

## 2018-01-05 NOTE — DISCHARGE NOTE ADULT - CARE PLAN
Principal Discharge DX:	Displaced spiral fracture of shaft of humerus, left arm, subsequent encounter for fracture with routine healing  Goal:	pain free activities fo daily living  Instructions for follow-up, activity and diet:	DIET: resume regular diet regimen   DVT PROPHYLAXIS: xarelto 20mg daily  WEIGHT-BEARING STATUS: non-weight bearing left arm; gentle passive ROM of left shoulder   active and passive ROM of left elbow/wrist/hand  BATHING: keep dressing clean and dry   DRESSING CHANGES: do not change dressing; keep dressing clean and dry Principal Discharge DX:	Displaced spiral fracture of shaft of humerus, left arm, subsequent encounter for fracture with routine healing  Goal:	pain free activities fo daily living  Instructions for follow-up, activity and diet:	DIET: resume regular diet regimen   DVT PROPHYLAXIS: xarelto 20mg daily  WEIGHT-BEARING STATUS: non-weight bearing left arm; gentle passive ROM of left shoulder   active and passive ROM of left elbow/wrist/hand  BATHING: keep dressing clean and dry   DRESSING CHANGES: do not change dressing; keep dressing clean and dry  Keep Aquacel dressing clean and dry   ice to affected incision every 4-6 hours x 72 hours   elevate affected extremity when @ rest

## 2018-01-05 NOTE — DISCHARGE NOTE ADULT - NS AS ACTIVITY OBS
WEIGHT-BEARING STATUS: non-weight bearing left arm; gentle passive ROM of left shoulder/Walking-Indoors allowed/Do not drive or operate machinery/Walking-Outdoors allowed/Do not make important decisions/No Heavy lifting/straining/Stairs allowed

## 2018-01-05 NOTE — DISCHARGE NOTE ADULT - ADDITIONAL INSTRUCTIONS
- Follow up with Dr. Corral in 10-14 days after surgery; call office for appointment upon discharge  - - Please have staples/sutures removed by physician 10-14 days after surgery if applicable  - - please follow up with your primary care doctor after discharge from hospital to discuss your hospital stay and any changes to you medications - Follow up with Dr. Corral in 10-14 days after surgery; call office for appointment upon discharge  - - Please have staples/sutures removed by physician 10-14 days after surgery if applicable  - - please follow up with your primary care doctor after discharge from hospital to discuss your hospital stay and any changes to you medications  Please call for an appointment

## 2018-01-05 NOTE — DISCHARGE NOTE ADULT - REASON FOR ADMISSION
Left humerus non-union Left humerus non-union  Open Reduction Internal Fixation / Surgical Repair L humerus

## 2018-01-05 NOTE — DISCHARGE NOTE ADULT - MEDICATION SUMMARY - MEDICATIONS TO TAKE
I will START or STAY ON the medications listed below when I get home from the hospital:    aspirin 81 mg oral tablet  -- 1 tab(s) by mouth once a day  -- Indication: For cardiac health    acetaminophen 325 mg oral tablet  -- 2 tab(s) by mouth every 6 hours, As needed, For Temp greater than 38 C (100.4 F)  -- Indication: For fever, pain, headache    acetaminophen 325 mg oral tablet  -- 2 tab(s) by mouth every 6 hours, As needed, Mild Pain (1 - 3)  -- Indication: For pain    oxyCODONE 10 mg oral tablet  -- 1 tab(s) by mouth every 4 hours, As needed, Moderate Pain  -- Indication: For pain    oxyCODONE 5 mg oral tablet  -- 1-2 tab(s) by mouth every 4-6 hours, As needed, Mild Pain MDD:8  -- Indication: For pain    dilTIAZem 120 mg/24 hours oral capsule, extended release  -- 1 cap(s) by mouth once a day  -- Indication: For antiarrythmia    rivaroxaban 20 mg oral tablet  -- 1 tab(s) by mouth every 24 hours  -- Indication: For a fib    Lipitor 40 mg oral tablet  -- 1 tab(s) by mouth once a day (at bedtime)  -- Indication: For HLD    metoprolol tartrate 25 mg oral tablet  -- 1 tab(s) by mouth 2 times a day  -- Indication: For antiarrythmia    docusate sodium 100 mg oral capsule  -- 1 cap(s) by mouth 3 times a day  -- Indication: For BM    PriLOSEC OTC 20 mg oral delayed release tablet  -- 1 tab(s) by mouth once a day  -- Indication: For ulcer ppx I will START or STAY ON the medications listed below when I get home from the hospital:    aspirin 81 mg oral tablet  -- 1 tab(s) by mouth once a day  -- Indication: For cardiac health    acetaminophen 325 mg oral tablet  -- 2 tab(s) by mouth every 6 hours, As needed, For Temp greater than 38 C (100.4 F)  -- Indication: For fever, pain, headache    acetaminophen 325 mg oral tablet  -- 2 tab(s) by mouth every 6 hours, As needed, Mild Pain (1 - 3)  -- Indication: For pain    oxyCODONE 10 mg oral tablet  -- 1 tab(s) by mouth every 4 hours, As needed, Moderate Pain  -- Indication: For pain    oxyCODONE 5 mg oral tablet  -- 1-2 tab(s) by mouth every 4-6 hours, As needed, Mild Pain MDD:8  -- Indication: For pain    dilTIAZem 120 mg/24 hours oral capsule, extended release  -- 1 cap(s) by mouth once a day  -- Indication: For antiarrythmia    rivaroxaban 20 mg oral tablet  -- 1 tab(s) by mouth every 24 hours  -- Indication: For a fib    Lipitor 40 mg oral tablet  -- 1 tab(s) by mouth once a day (at bedtime)  -- Indication: For HLD    metoprolol tartrate 25 mg oral tablet  -- 1 tab(s) by mouth 2 times a day  -- Indication: For antiarrythmia    docusate sodium 100 mg oral capsule  -- 1 cap(s) by mouth 3 times a day  Purchase over-the-counter Colace 100mg and continue to take three times-a-day to prevent constipation while on pain meds.    -- Indication: For stool softener    senna oral tablet  -- 2 tab(s) by mouth once a day (at bedtime)  while on pain medications   -- Indication: For laxative    PriLOSEC OTC 20 mg oral delayed release tablet  -- 1 tab(s) by mouth once a day  -- Indication: For ulcer ppx    Multiple Vitamins oral tablet  -- 1 tab(s) by mouth once a day  -- Indication: For supplement

## 2018-01-05 NOTE — DISCHARGE NOTE ADULT - PATIENT PORTAL LINK FT
“You can access the Follow[facility] Patient Portal, offered by [facility] [place], by registering with the following website: [email or web address]”

## 2018-01-05 NOTE — DISCHARGE NOTE ADULT - HOSPITAL COURSE
General:  ·  Admission Date: 20-Dec-2017.   ·  Arrived From home.  ·  Source of Information patient.    Care Providers:   Provider Role	Provider Name	Occupation  · Nurse Practitioner	Porsche Lyon NP     Other Care Providers:  · Primary Care Provider	Eliud Perez   (PMD/ Cardiologist )  · Care Providers for Follow up (PCP/Outpatient Provider)	Aj Brumfield  - [facility]  [phone]    Chief Complaint/Reason for Visit/HPI:    Chief Complaint/Reason for Visit:  Chief Complaint/Reason for Admission	left humerus fracture     History of Present Illness:  History of Present Illness	  73 y/o female poor historian with pmhx of HTN, HLD, h/o mechanical fall in 5/2017 sustained left humerus fracture. Pt was scheduled for left humerus ORIF in 8/2017 however, procedure was canceled - pt was in rapid Afib while in PST and sent to ED. Since that episode, she has been seen by her cardiologist, Dr. Perez and is feeling well - no c/o CP, palpitations, SOB. Now presents for ORIF left humerus.       Allergies/Medications:   Allergies:        Allergies:  	No Known Allergies:     Home Medications:   * Patient Currently Takes Medications as of 20-Dec-2017 14:47 documented in Structured Notes  · 	dilTIAZem 120 mg/24 hours oral capsule, extended release: Last Dose Taken:  , 1 cap(s) orally once a day  · 	metoprolol tartrate 25 mg oral tablet: Last Dose Taken:  , 1 tab(s) orally 2 times a day  · 	rivaroxaban 20 mg oral tablet: Last Dose Taken:  , 1 tab(s) orally every 24 hours  · 	aspirin 81 mg oral tablet: Last Dose Taken:  , 1 tab(s) orally once a day  · 	Lipitor 40 mg oral tablet: Last Dose Taken:  , 1 tab(s) orally once a day (at bedtime)  · 	PriLOSEC OTC 20 mg oral delayed release tablet: Last Dose Taken:  , 1 tab(s) orally once a day    PMH/PSH/FH/SH:    Past Medical History:  Afib  diagnosed 8/2017  Displaced spiral fracture of shaft of humerus, left arm, subsequent encounter for fracture with routine healing    GERD (gastroesophageal reflux disease)    HLD (hyperlipidemia)    HTN (hypertension)    Insomnia.     Past Surgical History:  H/O bilateral oophorectomy  15 yrs ago  S/P carotid endarterectomy  left   20 + yrs ago.    Hospital Course:  1/3: Admitted to [facility]; underwent left humerus open reduction internal fixation; tolerated procedure well   - patient had maxwell catheter placed for urinary retention postoperatively  1/4: evaluated by physical therapy/occupational therapy who recommended: home with home physical therapy General:  ·  Admission Date: 20-Dec-2017.   ·  Arrived From home.  ·  Source of Information patient.    Care Providers:   Provider Role	Provider Name	Occupation  · Nurse Practitioner	Porsche Lyon NP     Other Care Providers:  · Primary Care Provider	Eliud Perez   (PMD/ Cardiologist )  · Care Providers for Follow up (PCP/Outpatient Provider)	Aj Brumfield  - [facility]  [phone]    Chief Complaint/Reason for Visit/HPI:    Chief Complaint/Reason for Visit:  Chief Complaint/Reason for Admission	left humerus fracture     History of Present Illness:  History of Present Illness	  73 y/o female poor historian with pmhx of HTN, HLD, h/o mechanical fall in 5/2017 sustained left humerus fracture. Pt was scheduled for left humerus ORIF in 8/2017 however, procedure was canceled - pt was in rapid Afib while in PST and sent to ED. Since that episode, she has been seen by her cardiologist, Dr. Perez and is feeling well - no c/o CP, palpitations, SOB. Now presents for ORIF left humerus.       Allergies/Medications:   Allergies:        Allergies:  	No Known Allergies:     Home Medications:   * Patient Currently Takes Medications as of 20-Dec-2017 14:47 documented in Structured Notes  · 	dilTIAZem 120 mg/24 hours oral capsule, extended release: Last Dose Taken:  , 1 cap(s) orally once a day  · 	metoprolol tartrate 25 mg oral tablet: Last Dose Taken:  , 1 tab(s) orally 2 times a day  · 	rivaroxaban 20 mg oral tablet: Last Dose Taken:  , 1 tab(s) orally every 24 hours  · 	aspirin 81 mg oral tablet: Last Dose Taken:  , 1 tab(s) orally once a day  · 	Lipitor 40 mg oral tablet: Last Dose Taken:  , 1 tab(s) orally once a day (at bedtime)  · 	PriLOSEC OTC 20 mg oral delayed release tablet: Last Dose Taken:  , 1 tab(s) orally once a day    PMH/PSH/FH/SH:    Past Medical History:  Afib  diagnosed 8/2017  Displaced spiral fracture of shaft of humerus, left arm, subsequent encounter for fracture with routine healing    GERD (gastroesophageal reflux disease)    HLD (hyperlipidemia)    HTN (hypertension)    Insomnia.     Past Surgical History:  H/O bilateral oophorectomy  15 yrs ago  S/P carotid endarterectomy  left   20 + yrs ago.    Hospital Course:  1/3: Admitted to [facility]; underwent left humerus open reduction internal fixation; tolerated procedure well   - patient had maxwell catheter placed for urinary retention postoperatively  1/4: evaluated by physical therapy/occupational therapy who recommended: home with home physical therapy  1/5;  Pt cleared and stable for d/c -> home    Follow up Dr Corral in office

## 2019-01-01 NOTE — CHART NOTE - NSCHARTNOTEFT_GEN_A_CORE
Medicine NP    Notified by RN patient in afib 160s-170s again when using commode, and in pain. Ordered lopressor 5mg IVPx1 dose.  Notified by RN over an hour later that patient HR still in 160s and patient in pain. Ordered additional lopressor 5 mg IVPx1 dose and advised to give pain medication that is ordered prn. S/P lopressor unsuccessful in decreasing rate, Dr. Kong called. Cardizem 10 mg IVP x1 dose stat and drip ordered at 5mg/hr. Patient's HR slowed down to 130s but went back up to 160s, adjusted Cardizem gtt to 10mg/hr.  HR at this time 120s, pt received AM dose metoprolol. Continue to monitor patient. F/U primary team in AM. Paged Dr. Becker.    Jing Enriquez, [facility]  [phone] negative/7/28/2018

## 2021-05-28 NOTE — PROGRESS NOTE ADULT - PROBLEM SELECTOR PLAN 9
-Continue protonix 40mg interchange for prilosec 20mg
-Continue protonix 40mg
-Continue protonix 40mg interchange for prilosec 20mg
no

## 2021-11-09 NOTE — PROGRESS NOTE ADULT - PROVIDER SPECIALTY LIST ADULT
Patient comes to clinic for follow up anticoagulation visit.  Last INR on 10/25/2021 was 3.3.  Dose decreased.   Patient is currently bridging off Lovenox   Today's INR is 1.5 and is below goal range.    Current warfarin total weekly dose of 20 mg verified.  Informed the INR result below therapeutic range and instructed to take 7.5 mg warfarin tonight and tomorrow night and 6.25 mg Thursday night. Patient to continue on daily Lovenox . Discussed dose and return date of 11/12/2021 for next INR. See Anticoagulation flowsheet.    Dr Henderson is in the office today supervising the treatment.    Instructed to contact the clinic with any unusual bleeding or bruising, any changes in medications, diet, health status, lifestyle, or any other changes, questions or concerns. Verbalized understanding of all discussed.      Cardiology

## 2022-04-20 NOTE — PROGRESS NOTE ADULT - PROBLEM/PLAN-2
DISPLAY PLAN FREE TEXT
No assistance needed